# Patient Record
Sex: MALE | Race: WHITE | NOT HISPANIC OR LATINO | Employment: OTHER | URBAN - METROPOLITAN AREA
[De-identification: names, ages, dates, MRNs, and addresses within clinical notes are randomized per-mention and may not be internally consistent; named-entity substitution may affect disease eponyms.]

---

## 2018-08-25 ENCOUNTER — HOSPITAL ENCOUNTER (INPATIENT)
Facility: HOSPITAL | Age: 58
LOS: 9 days | DRG: 872 | End: 2018-09-06
Attending: EMERGENCY MEDICINE | Admitting: INTERNAL MEDICINE
Payer: COMMERCIAL

## 2018-08-25 DIAGNOSIS — F10.10 ALCOHOL ABUSE: ICD-10-CM

## 2018-08-25 DIAGNOSIS — M46.47 DISCITIS OF LUMBOSACRAL REGION: ICD-10-CM

## 2018-08-25 DIAGNOSIS — M46.46 DISCITIS OF LUMBAR REGION: ICD-10-CM

## 2018-08-25 DIAGNOSIS — M54.59 INTRACTABLE LOW BACK PAIN: Primary | ICD-10-CM

## 2018-08-25 DIAGNOSIS — M79.672 FOOT PAIN, LEFT: ICD-10-CM

## 2018-08-25 DIAGNOSIS — I10 HYPERTENSION: ICD-10-CM

## 2018-08-25 PROCEDURE — 96376 TX/PRO/DX INJ SAME DRUG ADON: CPT

## 2018-08-25 PROCEDURE — 96374 THER/PROPH/DIAG INJ IV PUSH: CPT

## 2018-08-25 RX ORDER — LIDOCAINE 50 MG/G
1 PATCH TOPICAL ONCE
Status: COMPLETED | OUTPATIENT
Start: 2018-08-25 | End: 2018-08-26

## 2018-08-25 RX ORDER — LEVOTHYROXINE AND LIOTHYRONINE 19; 4.5 UG/1; UG/1
30 TABLET ORAL DAILY
COMMUNITY
Start: 2018-06-12

## 2018-08-25 RX ORDER — CYCLOBENZAPRINE HCL 10 MG
5 TABLET ORAL ONCE
Status: COMPLETED | OUTPATIENT
Start: 2018-08-25 | End: 2018-08-25

## 2018-08-25 RX ORDER — AMLODIPINE BESYLATE 5 MG/1
5 TABLET ORAL DAILY
Status: ON HOLD | COMMUNITY
Start: 2018-04-16 | End: 2018-09-06

## 2018-08-25 RX ORDER — MORPHINE SULFATE 10 MG/ML
8 INJECTION, SOLUTION INTRAMUSCULAR; INTRAVENOUS ONCE
Status: COMPLETED | OUTPATIENT
Start: 2018-08-25 | End: 2018-08-25

## 2018-08-25 RX ADMIN — MORPHINE SULFATE 8 MG: 10 INJECTION, SOLUTION INTRAMUSCULAR; INTRAVENOUS at 23:19

## 2018-08-25 RX ADMIN — CYCLOBENZAPRINE HYDROCHLORIDE 5 MG: 10 TABLET, FILM COATED ORAL at 21:58

## 2018-08-25 RX ADMIN — MORPHINE SULFATE 8 MG: 10 INJECTION, SOLUTION INTRAMUSCULAR; INTRAVENOUS at 22:06

## 2018-08-25 RX ADMIN — LIDOCAINE 1 PATCH: 50 PATCH TOPICAL at 22:18

## 2018-08-26 ENCOUNTER — APPOINTMENT (OUTPATIENT)
Dept: RADIOLOGY | Facility: HOSPITAL | Age: 58
DRG: 872 | End: 2018-08-26
Payer: COMMERCIAL

## 2018-08-26 PROBLEM — D69.6 THROMBOCYTOPENIA (HCC): Status: ACTIVE | Noted: 2018-08-26

## 2018-08-26 PROBLEM — M54.59 INTRACTABLE LOW BACK PAIN: Status: ACTIVE | Noted: 2018-08-26

## 2018-08-26 PROBLEM — R79.89 ELEVATED LFTS: Status: ACTIVE | Noted: 2018-08-26

## 2018-08-26 PROBLEM — E87.6 HYPOKALEMIA: Status: ACTIVE | Noted: 2018-08-26

## 2018-08-26 LAB
ALBUMIN SERPL BCP-MCNC: 2.9 G/DL (ref 3.5–5)
ALBUMIN SERPL BCP-MCNC: 3.4 G/DL (ref 3.5–5)
ALP SERPL-CCNC: 136 U/L (ref 46–116)
ALP SERPL-CCNC: 143 U/L (ref 46–116)
ALT SERPL W P-5'-P-CCNC: 103 U/L (ref 12–78)
ALT SERPL W P-5'-P-CCNC: 90 U/L (ref 12–78)
ANION GAP SERPL CALCULATED.3IONS-SCNC: 12 MMOL/L (ref 4–13)
ANION GAP SERPL CALCULATED.3IONS-SCNC: 14 MMOL/L (ref 4–13)
AST SERPL W P-5'-P-CCNC: 116 U/L (ref 5–45)
AST SERPL W P-5'-P-CCNC: 93 U/L (ref 5–45)
BASOPHILS # BLD AUTO: 0.02 THOUSANDS/ΜL (ref 0–0.1)
BASOPHILS NFR BLD AUTO: 0 % (ref 0–1)
BILIRUB SERPL-MCNC: 1.5 MG/DL (ref 0.2–1)
BILIRUB SERPL-MCNC: 1.7 MG/DL (ref 0.2–1)
BUN SERPL-MCNC: 12 MG/DL (ref 5–25)
BUN SERPL-MCNC: 12 MG/DL (ref 5–25)
CALCIUM SERPL-MCNC: 8 MG/DL (ref 8.3–10.1)
CALCIUM SERPL-MCNC: 8.4 MG/DL (ref 8.3–10.1)
CHLORIDE SERPL-SCNC: 98 MMOL/L (ref 100–108)
CHLORIDE SERPL-SCNC: 98 MMOL/L (ref 100–108)
CO2 SERPL-SCNC: 21 MMOL/L (ref 21–32)
CO2 SERPL-SCNC: 25 MMOL/L (ref 21–32)
CREAT SERPL-MCNC: 1.09 MG/DL (ref 0.6–1.3)
CREAT SERPL-MCNC: 1.27 MG/DL (ref 0.6–1.3)
EOSINOPHIL # BLD AUTO: 0 THOUSAND/ΜL (ref 0–0.61)
EOSINOPHIL NFR BLD AUTO: 0 % (ref 0–6)
ERYTHROCYTE [DISTWIDTH] IN BLOOD BY AUTOMATED COUNT: 13.3 % (ref 11.6–15.1)
ERYTHROCYTE [DISTWIDTH] IN BLOOD BY AUTOMATED COUNT: 13.5 % (ref 11.6–15.1)
GFR SERPL CREATININE-BSD FRML MDRD: 61 ML/MIN/1.73SQ M
GFR SERPL CREATININE-BSD FRML MDRD: 74 ML/MIN/1.73SQ M
GLUCOSE SERPL-MCNC: 126 MG/DL (ref 65–140)
GLUCOSE SERPL-MCNC: 127 MG/DL (ref 65–140)
HCT VFR BLD AUTO: 37.4 % (ref 36.5–49.3)
HCT VFR BLD AUTO: 38.4 % (ref 36.5–49.3)
HGB BLD-MCNC: 13.1 G/DL (ref 12–17)
HGB BLD-MCNC: 13.2 G/DL (ref 12–17)
IMM GRANULOCYTES # BLD AUTO: 0.04 THOUSAND/UL (ref 0–0.2)
IMM GRANULOCYTES NFR BLD AUTO: 0 % (ref 0–2)
LYMPHOCYTES # BLD AUTO: 0.49 THOUSANDS/ΜL (ref 0.6–4.47)
LYMPHOCYTES NFR BLD AUTO: 6 % (ref 14–44)
MCH RBC QN AUTO: 33.2 PG (ref 26.8–34.3)
MCH RBC QN AUTO: 33.4 PG (ref 26.8–34.3)
MCHC RBC AUTO-ENTMCNC: 34.4 G/DL (ref 31.4–37.4)
MCHC RBC AUTO-ENTMCNC: 35 G/DL (ref 31.4–37.4)
MCV RBC AUTO: 95 FL (ref 82–98)
MCV RBC AUTO: 97 FL (ref 82–98)
MONOCYTES # BLD AUTO: 0.76 THOUSAND/ΜL (ref 0.17–1.22)
MONOCYTES NFR BLD AUTO: 9 % (ref 4–12)
NEUTROPHILS # BLD AUTO: 7.59 THOUSANDS/ΜL (ref 1.85–7.62)
NEUTS SEG NFR BLD AUTO: 85 % (ref 43–75)
NRBC BLD AUTO-RTO: 0 /100 WBCS
PLATELET # BLD AUTO: 61 THOUSANDS/UL (ref 149–390)
PLATELET # BLD AUTO: 72 THOUSANDS/UL (ref 149–390)
PMV BLD AUTO: 10 FL (ref 8.9–12.7)
PMV BLD AUTO: 9.6 FL (ref 8.9–12.7)
POTASSIUM SERPL-SCNC: 3.3 MMOL/L (ref 3.5–5.3)
POTASSIUM SERPL-SCNC: 3.3 MMOL/L (ref 3.5–5.3)
PROT SERPL-MCNC: 7.2 G/DL (ref 6.4–8.2)
PROT SERPL-MCNC: 7.6 G/DL (ref 6.4–8.2)
RBC # BLD AUTO: 3.92 MILLION/UL (ref 3.88–5.62)
RBC # BLD AUTO: 3.98 MILLION/UL (ref 3.88–5.62)
SODIUM SERPL-SCNC: 133 MMOL/L (ref 136–145)
SODIUM SERPL-SCNC: 135 MMOL/L (ref 136–145)
WBC # BLD AUTO: 7.25 THOUSAND/UL (ref 4.31–10.16)
WBC # BLD AUTO: 8.9 THOUSAND/UL (ref 4.31–10.16)

## 2018-08-26 PROCEDURE — 85027 COMPLETE CBC AUTOMATED: CPT | Performed by: PHYSICIAN ASSISTANT

## 2018-08-26 PROCEDURE — G8978 MOBILITY CURRENT STATUS: HCPCS

## 2018-08-26 PROCEDURE — 80053 COMPREHEN METABOLIC PANEL: CPT | Performed by: PHYSICIAN ASSISTANT

## 2018-08-26 PROCEDURE — 72110 X-RAY EXAM L-2 SPINE 4/>VWS: CPT

## 2018-08-26 PROCEDURE — 80053 COMPREHEN METABOLIC PANEL: CPT | Performed by: EMERGENCY MEDICINE

## 2018-08-26 PROCEDURE — 97163 PT EVAL HIGH COMPLEX 45 MIN: CPT

## 2018-08-26 PROCEDURE — G8979 MOBILITY GOAL STATUS: HCPCS

## 2018-08-26 PROCEDURE — 85025 COMPLETE CBC W/AUTO DIFF WBC: CPT | Performed by: EMERGENCY MEDICINE

## 2018-08-26 PROCEDURE — 99284 EMERGENCY DEPT VISIT MOD MDM: CPT

## 2018-08-26 PROCEDURE — 36415 COLL VENOUS BLD VENIPUNCTURE: CPT | Performed by: EMERGENCY MEDICINE

## 2018-08-26 PROCEDURE — 99220 PR INITIAL OBSERVATION CARE/DAY 70 MINUTES: CPT | Performed by: INTERNAL MEDICINE

## 2018-08-26 RX ORDER — KETOROLAC TROMETHAMINE 30 MG/ML
15 INJECTION, SOLUTION INTRAMUSCULAR; INTRAVENOUS EVERY 6 HOURS PRN
Status: DISCONTINUED | OUTPATIENT
Start: 2018-08-26 | End: 2018-08-26

## 2018-08-26 RX ORDER — LEVOTHYROXINE SODIUM 0.05 MG/1
50 TABLET ORAL
Status: DISCONTINUED | OUTPATIENT
Start: 2018-08-26 | End: 2018-09-06 | Stop reason: HOSPADM

## 2018-08-26 RX ORDER — METHOCARBAMOL 500 MG/1
500 TABLET, FILM COATED ORAL EVERY 6 HOURS PRN
Status: DISCONTINUED | OUTPATIENT
Start: 2018-08-26 | End: 2018-08-26

## 2018-08-26 RX ORDER — AMLODIPINE BESYLATE 5 MG/1
5 TABLET ORAL DAILY
Status: DISCONTINUED | OUTPATIENT
Start: 2018-08-26 | End: 2018-08-28

## 2018-08-26 RX ORDER — ONDANSETRON 2 MG/ML
4 INJECTION INTRAMUSCULAR; INTRAVENOUS EVERY 6 HOURS PRN
Status: DISCONTINUED | OUTPATIENT
Start: 2018-08-26 | End: 2018-09-06 | Stop reason: HOSPADM

## 2018-08-26 RX ORDER — LIDOCAINE 50 MG/G
1 PATCH TOPICAL DAILY
Status: DISCONTINUED | OUTPATIENT
Start: 2018-08-26 | End: 2018-08-27 | Stop reason: SDUPTHER

## 2018-08-26 RX ORDER — LANOLIN ALCOHOL/MO/W.PET/CERES
3 CREAM (GRAM) TOPICAL
Status: DISCONTINUED | OUTPATIENT
Start: 2018-08-26 | End: 2018-08-30

## 2018-08-26 RX ORDER — MORPHINE SULFATE 2 MG/ML
2 INJECTION, SOLUTION INTRAMUSCULAR; INTRAVENOUS EVERY 4 HOURS PRN
Status: DISCONTINUED | OUTPATIENT
Start: 2018-08-26 | End: 2018-09-06 | Stop reason: HOSPADM

## 2018-08-26 RX ORDER — IBUPROFEN 400 MG/1
400 TABLET ORAL EVERY 6 HOURS PRN
Status: DISCONTINUED | OUTPATIENT
Start: 2018-08-26 | End: 2018-08-26

## 2018-08-26 RX ORDER — ACETAMINOPHEN 325 MG/1
975 TABLET ORAL EVERY 8 HOURS SCHEDULED
Status: DISCONTINUED | OUTPATIENT
Start: 2018-08-26 | End: 2018-08-26

## 2018-08-26 RX ORDER — OXYCODONE HYDROCHLORIDE 5 MG/1
5 TABLET ORAL EVERY 4 HOURS PRN
Status: DISCONTINUED | OUTPATIENT
Start: 2018-08-26 | End: 2018-08-31

## 2018-08-26 RX ORDER — POTASSIUM CHLORIDE 20 MEQ/1
20 TABLET, EXTENDED RELEASE ORAL ONCE
Status: COMPLETED | OUTPATIENT
Start: 2018-08-26 | End: 2018-08-26

## 2018-08-26 RX ORDER — NAPROXEN 250 MG/1
500 TABLET ORAL 2 TIMES DAILY WITH MEALS
Status: DISCONTINUED | OUTPATIENT
Start: 2018-08-26 | End: 2018-08-29

## 2018-08-26 RX ORDER — POTASSIUM CHLORIDE 20 MEQ/1
40 TABLET, EXTENDED RELEASE ORAL ONCE
Status: COMPLETED | OUTPATIENT
Start: 2018-08-26 | End: 2018-08-26

## 2018-08-26 RX ORDER — METHOCARBAMOL 500 MG/1
500 TABLET, FILM COATED ORAL EVERY 6 HOURS SCHEDULED
Status: DISCONTINUED | OUTPATIENT
Start: 2018-08-26 | End: 2018-08-27

## 2018-08-26 RX ORDER — PANTOPRAZOLE SODIUM 40 MG/1
40 TABLET, DELAYED RELEASE ORAL
Status: DISCONTINUED | OUTPATIENT
Start: 2018-08-27 | End: 2018-09-06 | Stop reason: HOSPADM

## 2018-08-26 RX ORDER — TRAMADOL HYDROCHLORIDE 50 MG/1
50 TABLET ORAL EVERY 6 HOURS PRN
Status: DISCONTINUED | OUTPATIENT
Start: 2018-08-26 | End: 2018-09-06 | Stop reason: HOSPADM

## 2018-08-26 RX ADMIN — OXYCODONE HYDROCHLORIDE 5 MG: 5 TABLET ORAL at 14:33

## 2018-08-26 RX ADMIN — AMLODIPINE BESYLATE 5 MG: 5 TABLET ORAL at 08:05

## 2018-08-26 RX ADMIN — POTASSIUM CHLORIDE 20 MEQ: 1500 TABLET, EXTENDED RELEASE ORAL at 02:38

## 2018-08-26 RX ADMIN — POTASSIUM CHLORIDE 40 MEQ: 1500 TABLET, EXTENDED RELEASE ORAL at 09:06

## 2018-08-26 RX ADMIN — OXYCODONE HYDROCHLORIDE 5 MG: 5 TABLET ORAL at 21:17

## 2018-08-26 RX ADMIN — METHOCARBAMOL 500 MG: 500 TABLET ORAL at 17:35

## 2018-08-26 RX ADMIN — NAPROXEN 500 MG: 250 TABLET ORAL at 17:35

## 2018-08-26 RX ADMIN — MELATONIN TAB 3 MG 3 MG: 3 TAB at 21:17

## 2018-08-26 RX ADMIN — METHOCARBAMOL 500 MG: 500 TABLET ORAL at 08:05

## 2018-08-26 RX ADMIN — MELATONIN TAB 3 MG 3 MG: 3 TAB at 02:38

## 2018-08-26 RX ADMIN — TRAMADOL HYDROCHLORIDE 50 MG: 50 TABLET, COATED ORAL at 09:06

## 2018-08-26 RX ADMIN — LEVOTHYROXINE SODIUM 50 MCG: 50 TABLET ORAL at 05:10

## 2018-08-26 RX ADMIN — MORPHINE SULFATE 2 MG: 2 INJECTION, SOLUTION INTRAMUSCULAR; INTRAVENOUS at 11:10

## 2018-08-26 RX ADMIN — METHOCARBAMOL 500 MG: 500 TABLET ORAL at 02:38

## 2018-08-26 NOTE — ED NOTES
Pt requesting "sleep aid"  Provider aware, no orders to follow       Omid Huddleston RN  08/25/18 3003

## 2018-08-26 NOTE — ED PROVIDER NOTES
History  Chief Complaint   Patient presents with    Back Pain     Pt presents to the ED with severe exacerbation of back pain  Pt reports hx of L5 bulging disc  Pt states pain seems similiar  Pt reports potentional over exertion this week from exercise and working with a sledge hammer  Last med was ibuprofen 5 hrs ago  60 yr male with hx of lumbar hnp- on Wednesday was doing a lot of yard work- flew in from [facility] today - c/o low back pain across back in flight- worse upon landing-- no specific injury -- no b/b incont/ no saddle anesthesia- no ble weakness/senory comps- no abd /flank pain- no fevers- hx of ca        History provided by:  Patient   used: No    Back Pain       Prior to Admission Medications   Prescriptions Last Dose Informant Patient Reported? Taking? amLODIPine (NORVASC) 5 mg tablet  Self Yes Yes   Sig: Take 5 mg by mouth daily   thyroid desiccated (ARMOUR THYROID) 30 mg tablet  Self Yes Yes   Sig: Take 30 mg by mouth daily      Facility-Administered Medications: None       Past Medical History:   Diagnosis Date    Hyperlipidemia     Hypertension     Hypothyroidism     Lower back pain        History reviewed  No pertinent surgical history  History reviewed  No pertinent family history  I have reviewed and agree with the history as documented  Social History   Substance Use Topics    Smoking status: Never Smoker    Smokeless tobacco: Never Used    Alcohol use 9 6 oz/week     16 Cans of beer per week        Review of Systems   Constitutional: Negative  HENT: Negative  Eyes: Negative  Respiratory: Negative  Cardiovascular: Negative  Gastrointestinal: Negative  Endocrine: Negative  Genitourinary: Negative  Musculoskeletal: Positive for back pain  Negative for arthralgias, gait problem, joint swelling, myalgias, neck pain and neck stiffness  Skin: Negative  Allergic/Immunologic: Negative  Neurological: Negative      Hematological: Negative  Psychiatric/Behavioral: Negative  Physical Exam  Physical Exam   Constitutional: He is oriented to person, place, and time  He appears well-developed and well-nourished  He appears distressed  avss-- pulse ox 98 % on ra- interpretation is normal- no inter vention    HENT:   Head: Normocephalic and atraumatic  Eyes: Conjunctivae and EOM are normal  Pupils are equal, round, and reactive to light  Right eye exhibits no discharge  Left eye exhibits no discharge  No scleral icterus  Neck: Normal range of motion  Neck supple  No JVD present  No tracheal deviation present  No thyromegaly present  Cardiovascular: Normal rate, regular rhythm, normal heart sounds and intact distal pulses  Exam reveals no gallop and no friction rub  No murmur heard  Pulmonary/Chest: Effort normal and breath sounds normal  No stridor  No respiratory distress  He has no wheezes  He has no rales  He exhibits no tenderness  Abdominal: Soft  Bowel sounds are normal  He exhibits no distension and no mass  There is no tenderness  There is no rebound and no guarding  No hernia  Musculoskeletal: Normal range of motion  He exhibits no edema, tenderness or deformity  - pos pmt mid l spine tenderness- neg b slrt- pain wrore upon palpitation and any attempted movement-     - equal biataeral radial/dp pulses- no ble edema/claf tenderness/assym/ erythema   Lymphadenopathy:     He has no cervical adenopathy  Neurological: He is alert and oriented to person, place, and time  He displays normal reflexes  No cranial nerve deficit or sensory deficit  He exhibits normal muscle tone  Coordination normal    Skin: Skin is warm  Capillary refill takes less than 2 seconds  No rash noted  He is not diaphoretic  No erythema  No pallor  Psychiatric: He has a normal mood and affect  His behavior is normal  Judgment and thought content normal    Nursing note and vitals reviewed        Vital Signs  ED Triage Vitals [08/25/18 2125] Temperature Pulse Respirations Blood Pressure SpO2   98 °F (36 7 °C) 80 18 138/74 100 %      Temp Source Heart Rate Source Patient Position - Orthostatic VS BP Location FiO2 (%)   Oral Monitor Sitting Right arm --      Pain Score       Worst Possible Pain           Vitals:    08/25/18 2315 08/25/18 2330 08/26/18 0000 08/26/18 0039   BP:  123/65 127/66 131/68   Pulse: 88 90 96 99   Patient Position - Orthostatic VS:    Lying       Visual Acuity      ED Medications  Medications   lidocaine (LIDODERM) 5 % patch 1 patch (1 patch Transdermal Medication Applied 8/25/18 2218)   amLODIPine (NORVASC) tablet 5 mg (not administered)   levothyroxine tablet 50 mcg (not administered)   ondansetron (ZOFRAN) injection 4 mg (not administered)   ketorolac (TORADOL) injection 15 mg (not administered)   methocarbamol (ROBAXIN) tablet 500 mg (not administered)   melatonin tablet 3 mg (not administered)   lidocaine (LIDODERM) 5 % patch 1 patch (not administered)   traMADol (ULTRAM) tablet 50 mg (not administered)   morphine (PF) 10 mg/mL injection 8 mg (8 mg Intravenous Given 8/25/18 2206)   cyclobenzaprine (FLEXERIL) tablet 5 mg (5 mg Oral Given 8/25/18 2158)   morphine (PF) 10 mg/mL injection 8 mg (8 mg Intravenous Given 8/25/18 2319)       Diagnostic Studies  Results Reviewed     Procedure Component Value Units Date/Time    CBC and differential [[id number]]  (Abnormal) Collected:  08/26/18 0007    Lab Status:  Final result Specimen:  Blood from Arm, Right Updated:  08/26/18 0105     WBC 8 90 Thousand/uL      RBC 3 92 Million/uL      Hemoglobin 13 1 g/dL      Hematocrit 37 4 %      MCV 95 fL      MCH 33 4 pg      MCHC 35 0 g/dL      RDW 13 3 %      MPV 10 0 fL      Platelets 72 (L) Thousands/uL      nRBC 0 /100 WBCs      Neutrophils Relative 85 (H) %      Immat GRANS % 0 %      Lymphocytes Relative 6 (L) %      Monocytes Relative 9 %      Eosinophils Relative 0 %      Basophils Relative 0 %      Neutrophils Absolute 7 59 Thousands/µL Immature Grans Absolute 0 04 Thousand/uL      Lymphocytes Absolute 0 49 (L) Thousands/µL      Monocytes Absolute 0 76 Thousand/µL      Eosinophils Absolute 0 00 Thousand/µL      Basophils Absolute 0 02 Thousands/µL     Comprehensive metabolic panel [[id number]]  (Abnormal) Collected:  08/26/18 0007    Lab Status:  Final result Specimen:  Blood from Arm, Right Updated:  08/26/18 0029     Sodium 133 (L) mmol/L      Potassium 3 3 (L) mmol/L      Chloride 98 (L) mmol/L      CO2 21 mmol/L      Anion Gap 14 (H) mmol/L      BUN 12 mg/dL      Creatinine 1 09 mg/dL      Glucose 126 mg/dL      Calcium 8 4 mg/dL       (H) U/L       (H) U/L      Alkaline Phosphatase 143 (H) U/L      Total Protein 7 6 g/dL      Albumin 3 4 (L) g/dL      Total Bilirubin 1 70 (H) mg/dL      eGFR 74 ml/min/1 73sq m     Narrative:         National Kidney Disease Education Program recommendations are as follows:  GFR calculation is accurate only with a steady state creatinine  Chronic Kidney disease less than 60 ml/min/1 73 sq  meters  Kidney failure less than 15 ml/min/1 73 sq  meters                   XR spine lumbar minimum 4 views non injury    (Results Pending)              Procedures  Procedures       Phone Contacts  ED Phone Contact    ED Course  ED Course as of Aug 26 0124   Sat Aug 25, 2018   2254 ER MD NOTE- PT AND WIFE INFORMED ABOUT BaCK PAIN WITH RED FLAGS- PT HAS NO RED FLAGS-- WILL TRY TO CONTROL PAIN AND IF CAN NOT WILL ADMIT-- ER MD DID NOT GUARANTEE PT BRYAN IF HE IS ADMITTED-     2302 - ER MD NOTE-  PT - RE-EVALUATED- STILL IN PAIN- WILL RE DOSE MORPHINE AND RE-EVAL    Sun Aug 26, 2018   0000 - er md note- pt - re-evaluated feels improved with back pain -- aware of pending admit- will continue to re-evaluate in er                                 MDM  CritCare Time    Disposition  Final diagnoses:   Intractable low back pain     Time reflects when diagnosis was documented in both MDM as applicable and the Disposition within this note     Time User Action Codes Description Comment    8/25/2018 11:46 PM Fredo Barry Add [M54 5] Intractable low back pain       ED Disposition     ED Disposition Condition Comment    Admit  Case was discussed with DR MACDONALD and the patient's admission status was agreed to be Admission Status: observation status to the service of Dr Rick Heimlich   Follow-up Information    None         Current Discharge Medication List      CONTINUE these medications which have NOT CHANGED    Details   amLODIPine (NORVASC) 5 mg tablet Take 5 mg by mouth daily      thyroid desiccated (ARMOUR THYROID) 30 mg tablet Take 30 mg by mouth daily           No discharge procedures on file      ED Provider  Electronically Signed by           Gareth Wilson MD  08/26/18 0130

## 2018-08-26 NOTE — PLAN OF CARE
Problem: PHYSICAL THERAPY ADULT  Goal: Performs mobility at highest level of function for planned discharge setting  See evaluation for individualized goals  Treatment/Interventions: LE strengthening/ROM, Therapeutic exercise, Cognitive reorientation, Equipment eval/education, Bed mobility, Patient/family training  Equipment Recommended: Other (Comment) (pt needs dependent means for out of bed mobilization )       See flowsheet documentation for full assessment, interventions and recommendations  Prognosis: Fair  Problem List: Decreased strength, Decreased range of motion, Decreased endurance, Decreased mobility, Pain  Assessment: Pt presents with lower back pain  Has a history of a bulging disc at L5 which was reportedly repaired 5 years ago  Pt notes lower back pain over the past 6 months with some sciatica  Pt developed severe lower back pain with some radiation of the pain to his right buttock and some right foot numbness  order placed for PT eval and tx, w/ activity order of up w/ A  pt presents w/ comorbidities of hyperlipidemia, HTN, and lumbar surgery and personal factors of living in 2 story house and stair(s) to enter home  pt presents w/ pain, weakness, decreased ROM, decreased endurance, altered sensation and fall risk  these impairments are evident in findings from physical examination (weakness, decreased ROM and altered sensation), mobility assessment (need for assist for rolling and repositioning in bed, inability sit edge of bed or mobilize out of bed, need for input for mobility technique/body mechanics), and Barthel Index: 40/100  pt needed input for task focus and mobility technique  pt is at risk for falls due to physical and safety awareness deficits   pt's clinical presentation is unstable/unpredictable (evident in pain impacting overall mobility status, need for supplemental oxygen in order to maintain oxygen saturation, inability to mobilize out of bed, and need for input for mobility technique)  pt needs inpatient PT tx to improve mobility deficits  discharge recommendation is for inpatient rehab to reduce fall risk and maximize level of functional independence  Pt would likely benefit from orthopedics consult to address back pain  Pt presently needs dependent means for out of bed mobilization  Pt was instructed to raised head of bed as able  Also encouraged pt to change positions in bed as often as possible  NSG notified of recommendations  Recommendation: Post acute IP rehab, Other (Comment) (orthopedics consult regarding back pain)          See flowsheet documentation for full assessment

## 2018-08-26 NOTE — PHYSICAL THERAPY NOTE
PHYSICAL THERAPY EVALUATION NOTE    Patient Name: Fady Steiner  QPKEI'X Date: 8/26/2018  AGE:   60 y o   Mrn:   [id number]  ADMIT DX:  Back pain [M54 9]  Intractable low back pain [M54 5]    Past Medical History:   Diagnosis Date    Hyperlipidemia     Hypertension     Hypothyroidism     Lower back pain      Length Of Stay: 0  PHYSICAL THERAPY EVALUATION :   08/26/18 1541   Pain Assessment   Pain Assessment 0-10   Pain Score 8   Pain Location (lumbar and right buttock)   Home Living   Additional Comments pt is visiting from [place]  at home pt has 1 story house w/ 3 VINC[address]  pt has cabin in [facility] w/ 2 stories (3 [address] and 1st floor setup possible)  pt ambulated w/o assistive device  no DME  independent w/ ADLs and driving  pt works as   no history of supplemental oxygen use  Prior Function   Comments pt seen supine in bed w/ family present  agreed to PT eval  c/o lumbar and right buttock pain  pt wants to "get the pain to go away" and to "walk "   Restrictions/Precautions   Other Precautions Bed Alarm;O2;Fall Risk;Pain   General   Additional Pertinent History 1L oxygen via nasal cannula  resting pulse ox 96% and 91 BPM, active 94% and 101 BPM    Family/Caregiver Present Yes   Cognition   Overall Cognitive Status WFL   Arousal/Participation Alert   Orientation Level Oriented X4;Other (Comment)  (pt was identified w/ full name and birth date)   Following Commands Follows all commands and directions without difficulty   Comments Dr Candelario Perez entered during session and stated CT of lumbar spine would be ordered     RUE Assessment   RUE Assessment WFL  (4/5, shoulder 3/5)   LUE Assessment   LUE Assessment (4/5, shoulder 3-/5)   RLE Assessment   RLE Assessment X  (3/5, hip flexion and knee flexion 3-/5)   LLE Assessment   LLE Assessment X  (3/5, hip flexion and knee flexion 3-/5)   Coordination   Movements are Fluid and Coordinated 1   Sensation (light touch impaired right toes )   Bed Mobility   Rolling R 2  Maximal assistance   Additional items Assist x 1;Bedrails; Increased time required;Verbal cues;LE management  (+ muscle spasms)   Rolling L 2  Maximal assistance   Additional items Assist x 1;Bedrails; Increased time required;Verbal cues;LE management  (+ muscle spasms)   Supine to Sit Unable to assess  (due to pain)   Additional Comments pt was unable to tolerate head of bed elevation beyond 15* due to pain  Balance   Static Sitting Zero   Activity Tolerance   Activity Tolerance Patient limited by fatigue;Patient limited by pain   Nurse Made Aware spoke to Claudetta Gun, Dr Blease Goad CM   Assessment   Prognosis Fair   Problem List Decreased strength;Decreased range of motion;Decreased endurance;Decreased mobility;Pain   Assessment Pt presents with lower back pain  Has a history of a bulging disc at L5 which was reportedly repaired 5 years ago  Pt notes lower back pain over the past 6 months with some sciatica  Pt developed severe lower back pain with some radiation of the pain to his right buttock and some right foot numbness  order placed for PT eval and tx, w/ activity order of up w/ A  pt presents w/ comorbidities of hyperlipidemia, HTN, and lumbar surgery and personal factors of living in 2 story house and stair(s) to enter home  pt presents w/ pain, weakness, decreased ROM, decreased endurance, altered sensation and fall risk  these impairments are evident in findings from physical examination (weakness, decreased ROM and altered sensation), mobility assessment (need for assist for rolling and repositioning in bed, inability sit edge of bed or mobilize out of bed, need for input for mobility technique/body mechanics), and Barthel Index: 40/100  pt needed input for task focus and mobility technique  pt is at risk for falls due to physical and safety awareness deficits   pt's clinical presentation is unstable/unpredictable (evident in pain impacting overall mobility status, need for supplemental oxygen in order to maintain oxygen saturation, inability to mobilize out of bed, and need for input for mobility technique)  pt needs inpatient PT tx to improve mobility deficits  discharge recommendation is for inpatient rehab to reduce fall risk and maximize level of functional independence  Pt would likely benefit from orthopedics consult to address back pain  Pt presently needs dependent means for out of bed mobilization  Pt was instructed to raised head of bed as able  Also encouraged pt to change positions in bed as often as possible  NSG notified of recommendations  Goals   Patient Goals to walk, get the pain to go away   STG Expiration Date 09/05/18   Short Term Goal #1 pt will: Increase bilateral LE strength 1 grade to facilitate independent mobility, Perform all rolling and repositioning in bed independently to decrease fall risk factors and expedite return to independent ambulation, Complete exercise program independently to increase overall activity tolerance, Improve Barthel Index score to 65 or greater to facilitate independence, Complete mobility assessment to facilitate return to previous functional and work status   Treatment Day 0   Plan   Treatment/Interventions LE strengthening/ROM; Therapeutic exercise;Cognitive reorientation;Equipment eval/education; Bed mobility; Patient/family training   PT Frequency Other (Comment)  (3 to 5x/week as able)   Recommendation   Recommendation Post acute IP rehab; Other (Comment)  (orthopedics consult regarding back pain)   Equipment Recommended Other (Comment)  (pt needs dependent means for out of bed mobilization )   Barthel Index   Feeding 10   Bathing 0   Grooming Score 5   Dressing Score 5   Bladder Score 10   Bowels Score 10   Toilet Use Score 0   Transfers (Bed/Chair) Score 0   Mobility (Level Surface) Score 0   Stairs Score 0   Barthel Index Score 40     Skilled PT recommended while in hospital and upon DC to progress pt toward treatment goals       Josephine Lee, PT

## 2018-08-26 NOTE — ASSESSMENT & PLAN NOTE
· Presented with intractable lower back pain  · History of lower back pain and surgery 5 years ago for bulging disc at L5  · Pain control with lidocaine patch, prn tramadol, prn Robaxin and Aqua K pad  · Avoid Tylenol given elevated LFTs  · Obtain x-ray of L-spine  · Patient requesting MRI of lumbar spine; consider further imaging if pain does not improve/ worsens  · PT evaluation

## 2018-08-26 NOTE — CASE MANAGEMENT
Initial Clinical Review    Admission: Date/Time/Statement: 8/25 @ 2348    Orders Placed This Encounter   Procedures    Place in Observation (expected length of stay for this patient is less than two midnights)     Standing Status:   Standing     Number of Occurrences:   1     Order Specific Question:   Admitting Physician     Answer:   Kenyetta Wilder     Order Specific Question:   Level of Care     Answer:   Med Surg [16]         ED: Date/Time/Mode of Arrival:   ED Arrival Information     Expected Arrival Acuity Means of Arrival Escorted By Service Admission Type    - 8/25/2018 21:20 Urgent Stretcher Spouse General Medicine Urgent    Arrival Complaint    BACK PAIN          Chief Complaint:   Chief Complaint   Patient presents with    Back Pain     Pt presents to the ED with severe exacerbation of back pain  Pt reports hx of L5 bulging disc  Pt states pain seems similiar  Pt reports potentional over exertion this week from exercise and working with a sledge hammer  Last med was ibuprofen 5 hrs ago  History of Illness: Elizabeth Alamo is a 60 y o  male who presents with lower back pain  Patient has a history of a bulging disc at L5 which was reportedly repaired 5 years ago  He notes lower back pain over the past 6 months with some sciatica  He reports that about 5 days he had done a lot of yard work, was working with a sledge hammer and then yesterday he got on a plane from [place], where he resides, to come to the area and about an hour and a half into the flight he developed severe lower back pain with some radiation of the pain to his right buttock and some right foot numbness  He reports that he has had difficulty ambulating since due to the pain  He denies any recent fevers/ chills, abdominal pain, bowel or bladder changes  He notes worsening of his pain with movement   He received a total of 16 mg of IV morphine in the ED as well as Flexeril and a lidocaine patch and noticed some improvement in his pain         ED Vital Signs:   ED Triage Vitals [08/25/18 2125]   Temperature Pulse Respirations Blood Pressure SpO2   98 °F (36 7 °C) 80 18 138/74 100 %      Temp Source Heart Rate Source Patient Position - Orthostatic VS BP Location FiO2 (%)   Oral Monitor Sitting Right arm --      Pain Score       Worst Possible Pain        Wt Readings from Last 1 Encounters:   08/25/18 75 5 kg (166 lb 7 2 oz)       Vital Signs (abnormal):     Date/Time  Temp  Pulse  Resp  BP  MAP (mmHg)  SpO2  O2 Device  Patient Position - Orthostatic VS   08/26/18 0748  98 3 °F (36 8 °C)  91  18  136/84  105  97 %  Nasal cannula  Lying   08/26/18 0039  98 5 °F (36 9 °C)  99  18  131/68  93  98 %  Nasal cannula  Lying   08/26/18 0000  --  96  16  127/66  --  97 %  --  --   08/25/18 2330  --  90  16  123/65  --  97 %  --  --   08/25/18 2315  --  88  --  --  --  98 %  Nasal cannula  - 2L NC         Abnormal Labs/Diagnostic Test Results:   Results from last 7 days  Lab Units 08/26/18  0007   SODIUM mmol/L 133*   POTASSIUM mmol/L 3 3*   CHLORIDE mmol/L 98*   CO2 mmol/L 21   BUN mg/dL 12   CREATININE mg/dL 1 09   CALCIUM mg/dL 8 4   TOTAL PROTEIN g/dL 7 6   BILIRUBIN TOTAL mg/dL 1 70*   ALK PHOS U/L 143*   ALT U/L 103*   AST U/L 116*   GLUCOSE RANDOM mg/dL 126         ED Treatment:   Medication Administration from 08/25/2018 2120 to 08/26/2018 0031       Date/Time Order Dose Route Action Comments     08/25/2018 2206 morphine (PF) 10 mg/mL injection 8 mg 8 mg Intravenous Given      08/25/2018 2158 cyclobenzaprine (FLEXERIL) tablet 5 mg 5 mg Oral Given      08/25/2018 2218 lidocaine (LIDODERM) 5 % patch 1 patch 1 patch Transdermal Medication Applied      08/25/2018 2319 morphine (PF) 10 mg/mL injection 8 mg 8 mg Intravenous Given           Past Medical/Surgical History:    Active Ambulatory Problems     Diagnosis Date Noted    No Active Ambulatory Problems     Resolved Ambulatory Problems     Diagnosis Date Noted    No Resolved Ambulatory Problems     Past Medical History:   Diagnosis Date    Hyperlipidemia     Hypertension     Hypothyroidism     Lower back pain        Admitting Diagnosis: Back pain [M54 9]  Intractable low back pain [M54 5]    Age/Sex: 60 y o  male    Assessment/Plan:   * Intractable low back pain   Assessment & Plan     · Presented with intractable lower back pain  ? History of lower back pain and surgery 5 years ago for bulging disc at L5  · Pain control with lidocaine patch, prn tramadol, prn Robaxin and Aqua K pad    ? Avoid Tylenol given elevated LFTs  · Obtain x-ray of L-spine  ? Patient requesting MRI of lumbar spine; consider further imaging if pain does not improve/ worsens  · PT evaluation            Elevated LFTs   Assessment & Plan     · Patient reports known h/o elevated LFTs which has been attributed to his alcohol use  · Patient currently in 12 step program for his alcohol use  · Denies abdominal pain  · Continue to monitor  Alcohol cessation            Thrombocytopenia (HCC)   Assessment & Plan     · Platelet count 72  Possibly due to alcohol use  · Repeat CBC in AM           Hypothyroidism   Assessment & Plan     · Taking Laurel 30 mg daily  Substitute levothyroxine while inpatient            Hypertension   Assessment & Plan     · BP acceptable  · Continue amlodipine           Hyperlipidemia   Assessment & Plan     · Reportedly taking atorvastatin, unable to recall dosage           Hypokalemia   Assessment & Plan     · Replete  Repeat BMP in AM               VTE Prophylaxis: low risk  / sequential compression device   Code Status: Level 1- Full Code     Anticipated Length of Stay:  Patient will be admitted on an Observation basis with an anticipated length of stay of  < 2 midnights  Justification for Hospital Stay: lower back pain       Admission Orders:  Scheduled Meds:   Current Facility-Administered Medications:  amLODIPine 5 mg Oral Daily Ernesto Villalta PA-C   levothyroxine 50 mcg Oral Early Morning Ophelia Zuñiga PA-C   lidocaine 1 patch Transdermal Once Anjana Jarrett MD   lidocaine 1 patch Transdermal Daily Ophelia Zuñiga PA-C   melatonin 3 mg Oral HS Ophelia Zuñiga PA-C   methocarbamol 500 mg Oral Q6H PRN Ophelia Zuñiga PA-C   ondansetron 4 mg Intravenous Q6H PRN Ophelia Zuñiga PA-C   traMADol 50 mg Oral Q6H PRN Ophelia Zuñiga PA-C     Continuous Infusions:    PRN Meds: methocarbamol    ondansetron    traMADol    Regular diet  Xray lumbar spine  PT eval treat  SCD's

## 2018-08-26 NOTE — H&P
H&P- Mikayla Ambrocio 1960, 60 y o  male MRN: [id number]    Unit/Bed#: -01 Encounter: [id number]    Primary Care Provider: No primary care provider on file  Date and time admitted to hospital: 8/25/2018  9:25 PM        * Intractable low back pain   Assessment & Plan    · Presented with intractable lower back pain  · History of lower back pain and surgery 5 years ago for bulging disc at L5  · Pain control with lidocaine patch, prn tramadol, prn Robaxin and Aqua K pad  · Avoid Tylenol given elevated LFTs  · Obtain x-ray of L-spine  · Patient requesting MRI of lumbar spine; consider further imaging if pain does not improve/ worsens  · PT evaluation  Elevated LFTs   Assessment & Plan    · Patient reports known h/o elevated LFTs which has been attributed to his alcohol use  · Patient currently in 12 step program for his alcohol use  · Denies abdominal pain  · Continue to monitor  Alcohol cessation  Thrombocytopenia (HCC)   Assessment & Plan    · Platelet count 72  Possibly due to alcohol use  · Repeat CBC in AM         Hypothyroidism   Assessment & Plan    · Taking Stanley 30 mg daily  Substitute levothyroxine while inpatient  Hypertension   Assessment & Plan    · BP acceptable  · Continue amlodipine  Hyperlipidemia   Assessment & Plan    · Reportedly taking atorvastatin, unable to recall dosage  Hypokalemia   Assessment & Plan    · Replete  Repeat BMP in AM            VTE Prophylaxis: low risk  / sequential compression device   Code Status: Level 1- Full Code  POLST: POLST form is not discussed and not completed at this time  Anticipated Length of Stay:  Patient will be admitted on an Observation basis with an anticipated length of stay of  < 2 midnights  Justification for Hospital Stay: lower back pain  Total Time for Visit, including Counseling / Coordination of Care: 45 minutes    Greater than 50% of this total time spent on direct patient counseling and coordination of care  Chief Complaint:   Lower back pain  History of Present Illness:    Tiffani Stephenson is a 60 y o  male who presents with lower back pain  Patient has a history of a bulging disc at L5 which was reportedly repaired 5 years ago  He notes lower back pain over the past 6 months with some sciatica  He reports that about 5 days he had done a lot of yard work, was working with a sledge hammer and then yesterday he got on a plane from [place], where he resides, to come to the area and about an hour and a half into the flight he developed severe lower back pain with some radiation of the pain to his right buttock and some right foot numbness  He reports that he has had difficulty ambulating since due to the pain  He denies any recent fevers/ chills, abdominal pain, bowel or bladder changes  He notes worsening of his pain with movement  He received a total of 16 mg of IV morphine in the ED as well as Flexeril and a lidocaine patch and noticed some improvement in his pain  Review of Systems:    Review of Systems   Musculoskeletal: Positive for back pain and gait problem  All other systems reviewed and are negative  Past Medical and Surgical History:     Past Medical History:   Diagnosis Date    Hyperlipidemia     Hypertension     Hypothyroidism     Lower back pain        History reviewed  No pertinent surgical history  Meds/Allergies:    Prior to Admission medications    Medication Sig Start Date End Date Taking? Authorizing Provider   amLODIPine (NORVASC) 5 mg tablet Take 5 mg by mouth daily 4/16/18  Yes Historical Provider, MD   thyroid desiccated (ARMOUR THYROID) 30 mg tablet Take 30 mg by mouth daily 6/12/18  Yes Historical Provider, MD     I have reviewed home medications with patient personally      Allergies: No Known Allergies    Social History:     Marital Status: /Civil Union   Patient Pre-hospital Living Situation: Resides in [place] with family  Patient Pre-hospital Level of Mobility: ambulates without assistance  Patient Pre-hospital Diet Restrictions: none  Substance Use History:   History   Alcohol Use    9 6 oz/week    16 Cans of beer per week     History   Smoking Status    Never Smoker   Smokeless Tobacco    Never Used     History   Drug Use No       Family History:    History reviewed  No pertinent family history  Physical Exam:     Vitals:   Blood Pressure: 131/68 (08/26/18 0039)  Pulse: 99 (08/26/18 0039)  Temperature: 98 5 °F (36 9 °C) (08/26/18 0039)  Temp Source: Oral (08/26/18 0039)  Respirations: 18 (08/26/18 0039)  Weight - Scale: 75 5 kg (166 lb 7 2 oz) (08/25/18 2125)  SpO2: 98 % (08/26/18 0039)    Physical Exam   Constitutional: He is oriented to person, place, and time  He appears well-developed and well-nourished  No distress  HENT:   Head: Normocephalic and atraumatic  Eyes: Conjunctivae are normal    Cardiovascular: Normal rate and regular rhythm  Pulmonary/Chest: Effort normal and breath sounds normal  No respiratory distress  Abdominal: Soft  Bowel sounds are normal  He exhibits no distension  There is no tenderness  Musculoskeletal: He exhibits no edema  Lumbar back: He exhibits tenderness and pain (with movement)  Neurological: He is alert and oriented to person, place, and time  No cranial nerve deficit  Skin: Skin is warm and dry  He is not diaphoretic  No erythema  Psychiatric: He has a normal mood and affect  His behavior is normal    Nursing note and vitals reviewed  Additional Data:     Lab Results: I have personally reviewed pertinent reports          Results from last 7 days  Lab Units 08/26/18 0007   WBC Thousand/uL 8 90   HEMOGLOBIN g/dL 13 1   HEMATOCRIT % 37 4   PLATELETS Thousands/uL 72*   NEUTROS PCT % 85*   LYMPHS PCT % 6*   MONOS PCT % 9   EOS PCT % 0       Results from last 7 days  Lab Units 08/26/18 0007   SODIUM mmol/L 133*   POTASSIUM mmol/L 3 3*   CHLORIDE mmol/L 98* CO2 mmol/L 21   BUN mg/dL 12   CREATININE mg/dL 1 09   CALCIUM mg/dL 8 4   TOTAL PROTEIN g/dL 7 6   BILIRUBIN TOTAL mg/dL 1 70*   ALK PHOS U/L 143*   ALT U/L 103*   AST U/L 116*   GLUCOSE RANDOM mg/dL 126                   Imaging: imaging pending    XR spine lumbar minimum 4 views non injury    (Results Pending)     Allscripts / Epic Records Reviewed: Yes     ** Please Note: This note has been constructed using a voice recognition system   **

## 2018-08-26 NOTE — ASSESSMENT & PLAN NOTE
· Patient reports known h/o elevated LFTs which has been attributed to his alcohol use  · Patient currently in 12 step program for his alcohol use  · Denies abdominal pain  · Continue to monitor  Alcohol cessation

## 2018-08-27 ENCOUNTER — APPOINTMENT (OUTPATIENT)
Dept: CT IMAGING | Facility: HOSPITAL | Age: 58
DRG: 872 | End: 2018-08-27
Payer: COMMERCIAL

## 2018-08-27 PROBLEM — E87.1 HYPONATREMIA: Status: ACTIVE | Noted: 2018-08-27

## 2018-08-27 PROBLEM — E87.6 HYPOKALEMIA: Status: RESOLVED | Noted: 2018-08-26 | Resolved: 2018-08-27

## 2018-08-27 LAB
ANION GAP SERPL CALCULATED.3IONS-SCNC: 8 MMOL/L (ref 4–13)
BUN SERPL-MCNC: 11 MG/DL (ref 5–25)
CALCIUM SERPL-MCNC: 8.5 MG/DL (ref 8.3–10.1)
CHLORIDE SERPL-SCNC: 102 MMOL/L (ref 100–108)
CO2 SERPL-SCNC: 24 MMOL/L (ref 21–32)
CREAT SERPL-MCNC: 0.97 MG/DL (ref 0.6–1.3)
CRP SERPL QL: >90 MG/L
ERYTHROCYTE [DISTWIDTH] IN BLOOD BY AUTOMATED COUNT: 13.4 % (ref 11.6–15.1)
GFR SERPL CREATININE-BSD FRML MDRD: 85 ML/MIN/1.73SQ M
GLUCOSE SERPL-MCNC: 131 MG/DL (ref 65–140)
HCT VFR BLD AUTO: 37.9 % (ref 36.5–49.3)
HGB BLD-MCNC: 12.7 G/DL (ref 12–17)
MAGNESIUM SERPL-MCNC: 2.1 MG/DL (ref 1.6–2.6)
MCH RBC QN AUTO: 32.8 PG (ref 26.8–34.3)
MCHC RBC AUTO-ENTMCNC: 33.5 G/DL (ref 31.4–37.4)
MCV RBC AUTO: 98 FL (ref 82–98)
PLATELET # BLD AUTO: 55 THOUSANDS/UL (ref 149–390)
PMV BLD AUTO: 10.2 FL (ref 8.9–12.7)
POTASSIUM SERPL-SCNC: 3.9 MMOL/L (ref 3.5–5.3)
RBC # BLD AUTO: 3.87 MILLION/UL (ref 3.88–5.62)
SODIUM SERPL-SCNC: 134 MMOL/L (ref 136–145)
WBC # BLD AUTO: 6.25 THOUSAND/UL (ref 4.31–10.16)

## 2018-08-27 PROCEDURE — 85027 COMPLETE CBC AUTOMATED: CPT | Performed by: INTERNAL MEDICINE

## 2018-08-27 PROCEDURE — 80048 BASIC METABOLIC PNL TOTAL CA: CPT | Performed by: INTERNAL MEDICINE

## 2018-08-27 PROCEDURE — 83735 ASSAY OF MAGNESIUM: CPT | Performed by: INTERNAL MEDICINE

## 2018-08-27 PROCEDURE — 97530 THERAPEUTIC ACTIVITIES: CPT

## 2018-08-27 PROCEDURE — 99245 OFF/OP CONSLTJ NEW/EST HI 55: CPT | Performed by: NEUROLOGICAL SURGERY

## 2018-08-27 PROCEDURE — 72131 CT LUMBAR SPINE W/O DYE: CPT

## 2018-08-27 PROCEDURE — 99225 PR SBSQ OBSERVATION CARE/DAY 25 MINUTES: CPT | Performed by: PHYSICIAN ASSISTANT

## 2018-08-27 PROCEDURE — 86140 C-REACTIVE PROTEIN: CPT | Performed by: PHYSICIAN ASSISTANT

## 2018-08-27 RX ORDER — MUSCLE RUB CREAM 100; 150 MG/G; MG/G
CREAM TOPICAL 4 TIMES DAILY PRN
Status: DISCONTINUED | OUTPATIENT
Start: 2018-08-27 | End: 2018-08-30

## 2018-08-27 RX ORDER — METHOCARBAMOL 750 MG/1
750 TABLET, FILM COATED ORAL EVERY 6 HOURS SCHEDULED
Status: DISCONTINUED | OUTPATIENT
Start: 2018-08-27 | End: 2018-09-06 | Stop reason: HOSPADM

## 2018-08-27 RX ORDER — LIDOCAINE 50 MG/G
1 PATCH TOPICAL DAILY
Status: DISCONTINUED | OUTPATIENT
Start: 2018-08-27 | End: 2018-08-30

## 2018-08-27 RX ADMIN — MELATONIN TAB 3 MG 3 MG: 3 TAB at 22:29

## 2018-08-27 RX ADMIN — OXYCODONE HYDROCHLORIDE 5 MG: 5 TABLET ORAL at 22:29

## 2018-08-27 RX ADMIN — METHOCARBAMOL 750 MG: 750 TABLET, FILM COATED ORAL at 17:03

## 2018-08-27 RX ADMIN — METHOCARBAMOL 750 MG: 750 TABLET, FILM COATED ORAL at 13:14

## 2018-08-27 RX ADMIN — METHOCARBAMOL 750 MG: 750 TABLET, FILM COATED ORAL at 23:59

## 2018-08-27 RX ADMIN — METHOCARBAMOL 500 MG: 500 TABLET ORAL at 01:16

## 2018-08-27 RX ADMIN — LEVOTHYROXINE SODIUM 50 MCG: 50 TABLET ORAL at 06:18

## 2018-08-27 RX ADMIN — OXYCODONE HYDROCHLORIDE 5 MG: 5 TABLET ORAL at 18:14

## 2018-08-27 RX ADMIN — OXYCODONE HYDROCHLORIDE 5 MG: 5 TABLET ORAL at 13:14

## 2018-08-27 RX ADMIN — MENTHOL, METHYL SALICYLATE 1 APPLICATION: 10; 15 CREAM TOPICAL at 22:31

## 2018-08-27 RX ADMIN — NAPROXEN 500 MG: 250 TABLET ORAL at 17:04

## 2018-08-27 RX ADMIN — NAPROXEN 500 MG: 250 TABLET ORAL at 08:30

## 2018-08-27 RX ADMIN — LIDOCAINE 1 PATCH: 50 PATCH CUTANEOUS at 11:00

## 2018-08-27 RX ADMIN — MORPHINE SULFATE 2 MG: 2 INJECTION, SOLUTION INTRAMUSCULAR; INTRAVENOUS at 19:28

## 2018-08-27 RX ADMIN — PANTOPRAZOLE SODIUM 40 MG: 40 TABLET, DELAYED RELEASE ORAL at 06:17

## 2018-08-27 RX ADMIN — METHOCARBAMOL 500 MG: 500 TABLET ORAL at 06:18

## 2018-08-27 NOTE — PLAN OF CARE
Problem: PHYSICAL THERAPY ADULT  Goal: Performs mobility at highest level of function for planned discharge setting  See evaluation for individualized goals  Treatment/Interventions: LE strengthening/ROM, Therapeutic exercise, Cognitive reorientation, Equipment eval/education, Bed mobility, Patient/family training  Equipment Recommended: Other (Comment) (pt needs dependent means for out of bed mobilization )       See flowsheet documentation for full assessment, interventions and recommendations  Outcome: Progressing  Prognosis: Fair  Problem List: Decreased strength, Decreased range of motion, Decreased endurance, Decreased mobility, Pain, Impaired balance  Assessment: Nursing premedicated pt prior to session  Pt reports feeling a little better, able to move more in the bed with LE's  Pt was successful in performing supine<>sit using a majority of bed controls, but only tolerated sitting for 3min on edge of bed  Pt was + for SLR on LLE and notified SLIM as there is no current  documentation from NS  Await further recommendations from SLIM/ NS to facilitate progression toward increased level of function  Goals adjusted below to reflect progress  Barriers to Discharge: Inaccessible home environment     Recommendation: Post acute IP rehab          See flowsheet documentation for full assessment

## 2018-08-27 NOTE — ASSESSMENT & PLAN NOTE
· Patient reports known h/o mildly elevated LFTs which has been attributed to his alcohol use  · Patient currently in 12 step program for his alcohol use  · Denies abdominal pain  · Continue to monitor  Alcohol cessation

## 2018-08-27 NOTE — CONSULTS
Consultation - Neurosurgery   Fransico Vargas 60 y o  male MRN: [id number]  Unit/Bed#: -01 Encounter: [id number]      Assessment/Plan     Assessment:  Acute low back pain, concern for re-herniation  Low likelihood of discitis but will r/o with imaging  Plan:  MRI l/s with and without contrast  ESR/CRP    History of Present Illness       HPI: Fransico Vargas is a 60y o  year old male who presents with acute onset of LBP and worsening LLE foot pain after heavy lifting recently  He is from the [place] area and is visiting family in the area  No b/b issues  He had a hx of diskectomy 5 yrs prior with improvement in leg pain  This episode is new  He reports some subjective weakness in the distal LLE and pain in the foot no associated numbness  Consults    Review of Systems   Constitutional: Negative  Eyes: Negative  Respiratory: Negative  Cardiovascular: Negative  Gastrointestinal: Negative  Genitourinary: Negative  Allergic/Immunologic: Negative  Hematological: Negative  Psychiatric/Behavioral: Negative  Historical Information   Past Medical History:   Diagnosis Date    Hyperlipidemia     Hypertension     Hypothyroidism     Lower back pain      History reviewed  No pertinent surgical history  History   Alcohol Use    9 6 oz/week    16 Cans of beer per week     History   Drug Use No     History   Smoking Status    Never Smoker   Smokeless Tobacco    Never Used     History reviewed  No pertinent family history  Meds/Allergies   all current active meds have been reviewed  No Known Allergies    Objective     Intake/Output Summary (Last 24 hours) at 08/27/18 1717  Last data filed at 08/27/18 1635   Gross per 24 hour   Intake              940 ml   Output             1350 ml   Net             -410 ml       Physical Exam   Constitutional: He is oriented to person, place, and time  He appears well-developed and well-nourished  HENT:   Head: Normocephalic and atraumatic  Eyes: Pupils are equal, round, and reactive to light  Neck: Normal range of motion  Neck supple  Cardiovascular: Normal rate  Pulmonary/Chest: Effort normal    Abdominal: Soft  Bowel sounds are normal    Musculoskeletal: Normal range of motion  Neurological: He is alert and oriented to person, place, and time  He has normal reflexes  No cranial nerve deficit or sensory deficit  GCS eye subscore is 4  GCS verbal subscore is 5  GCS motor subscore is 6    5/5 BUE  5/5 RLE  5/5 LLE HF, KE/KF, 4+/5 DF/PF EHL     Neurologic Exam     Mental Status   Oriented to person, place, and time  Cranial Nerves     CN III, IV, VI   Pupils are equal, round, and reactive to light  Vitals:Blood pressure 120/59, pulse 81, temperature 98 1 °F (36 7 °C), temperature source Oral, resp  rate 18, weight 75 5 kg (166 lb 7 2 oz), SpO2 97 %  ,There is no height or weight on file to calculate BMI  Lab Results: I have personally reviewed pertinent results  Imaging Studies: I have personally reviewed pertinent reports  CT shows L5-S1 possible discitis versus post op changes    EKG, Pathology, and Other Studies: I have personally reviewed pertinent reports        VTE Prophylaxis:     Code Status: Level 1 - Full Code  Advance Directive and Living Will:      Power of :    POLST:      Counseling / Coordination of Care

## 2018-08-27 NOTE — CASE MANAGEMENT
Continued Stay Review    Date: 8/27/2018    Vital Signs: /59 (BP Location: Right arm)   Pulse 89   Temp 98 8 °F (37 1 °C) (Oral)   Resp 18   Wt 75 5 kg (166 lb 7 2 oz)   SpO2 96%     Medications:   Scheduled Meds:   Current Facility-Administered Medications:  amLODIPine 5 mg Oral Daily   levothyroxine 50 mcg Oral Early Morning   lidocaine 1 patch Transdermal Daily   melatonin 3 mg Oral HS   menthol-methyl salicylate  Apply externally 4x Daily PRN   methocarbamol 750 mg Oral Q6H ANIA   morphine injection 2 mg Intravenous Q4H PRN   naproxen 500 mg Oral BID With Meals   ondansetron 4 mg Intravenous Q6H PRN   oxyCODONE 5 mg Oral Q4H PRN   pantoprazole 40 mg Oral Early Morning   traMADol 50 mg Oral Q6H PRN     Continuous Infusions:    PRN Meds: not used    Abnormal Labs/Diagnostic Results:   Platelets 55    Ct lumbar spine - Multilevel degenerative changes   Endplate changes most severe at the L5-S1 level   No phlegmon at L5-S1 to suggest changes are related to discitis/osteomyelitis   If there is clinical concern for infection, serologic exclusion suggested    Age/Sex: 60 y o  male     Assessment/Plan:   Intractable low back pain   Assessment & Plan     · Presented with intractable lower back pain, also with pain into his left foot after doing significant exertion in his yard and going on a prolonged plane ride  ? History of lower back pain and surgery in [place] 5 years ago for bulging disc at L5  · Pain regimen=aqua K, menthol rub, lidocaine patch, prn tramadol, prn oxycodone, prn IV morphine,ATC 750mg Robaxin   ? Avoid Tylenol given elevated LFTs  · x-ray of L-spine reviewed, CT T spine done, results pending  ? Patient requesting MRI of lumbar spine; consider MRI and spine surgery eval  · PT evaluation noted--they are recommending inpatient acute rehab  Patient is from [place] and this would be a considerable hardship on the patient to remain in [place] for an additional time to do rehab  Await PT re-evaluation today           Elevated LFTs   Assessment & Plan     · Patient reports known h/o mildly elevated LFTs which has been attributed to his alcohol use  · Patient currently in 12 step program for his alcohol use  · Denies abdominal pain  · Continue to monitor  Alcohol cessation            Hyponatremia   Assessment & Plan     · Very mild, monitor          Hypokalemiaresolved as of 8/27/2018   Assessment & Plan     · Repleted          Hypertension   Assessment & Plan     · BP acceptable  · Continue amlodipine           Thrombocytopenia (HCC)   Assessment & Plan     · Platelet count 24O  Possibly due to alcohol use          Per neurosurgery - Acute low back pain, concern for re-herniation   Low likelihood of discitis but will r/o with imaging      Plan:  MRI l/s with and without contrast  ESR/CRP       Discharge Plan: PT recommends post acute IP rehab

## 2018-08-27 NOTE — ASSESSMENT & PLAN NOTE
· Presented with intractable lower back pain, also with pain into his left foot after doing significant exertion in his yard and going on a prolonged plane ride  · History of lower back pain and surgery in [place] 5 years ago for bulging disc at L5  · Pain regimen=aqua K, menthol rub, lidocaine patch, prn tramadol, prn oxycodone, prn IV morphine,ATC 750mg Robaxin   · Avoid Tylenol given elevated LFTs  · x-ray of L-spine reviewed, CT T spine done, results pending  · Patient requesting MRI of lumbar spine; consider MRI and spine surgery eval  · PT evaluation noted--they are recommending inpatient acute rehab  Patient is from [place] and this would be a considerable hardship on the patient to remain in [place] for an additional time to do rehab  Await PT re-evaluation today

## 2018-08-27 NOTE — SOCIAL WORK
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  Patient does not use a pharmacy in this area  He would like [facility] for any medications at discharge  Patient has an advance directive/living will  Copy requested from patient  Patient's wife is his POA  Her name is Arabella Barnes  Her number is [phone]  Patient declined CM calling wife at this time  Wife is coming to see patient shortly and patient has been updating wife  Patient denies history of drug abuse and mental illness  Patient has a history of alcohol abuse  Patient stated he is in a 12 step program in [place] and declines resources at this time  CM discussed with patient re:DCP  Patient aware that PT is recommending inpatient rehab  Patient would like to work with PT again and is waiting for CT results  CM contact information given to patient  Will continue to f/u with patient during hospital stay  CM reviewed with patient re:outpatient observation notice  Form signed  Copy of form given to patient  All questions answered at bedside  CM name and role reviewed  Discharge Checklist reviewed and CM will continue to monitor for progress toward discharge goals in nursing and provider rounds      CM reviewed discharge planning process including the following: identifying caregivers at home, preference for d/c planning needs, [facility], availability of treatment team to discuss questions or concerns patient and/or family may have regarding diagnosis, plan of care, old or new medications and discharge planning   CM will continue to follow for care coordination and update assessment as necessary

## 2018-08-27 NOTE — CASE MANAGEMENT
Continued Stay Review  OBSERVATION    Date: 8/26/2018   Patient came in due to intractable low back pain  Patient had history of surgery approximately 5 years ago for a bulging disc at L5  This was done in [place]  According to him, he was told at that time by his surgeons, that his L3 and L4 are diseased, but did not need surgery at that time  When I saw the patient earlier today, patient was still in severe pains at the lower back with muscle spasms noted on examination  There was no radiation of the pain on his bilateral lower extremities  Vital Signs:   08/26/18 2300  99 2 °F (37 3 °C)  93  18  116/65  82  94 %  Nasal cannula  Lying       Medications:   Scheduled Meds:   Current Facility-Administered Medications:  amLODIPine 5 mg Oral Daily   levothyroxine 50 mcg Oral Early Morning   lidocaine 1 patch Transdermal Daily   melatonin 3 mg Oral HS   methocarbamol 500 mg Oral Q6H ANIA   morphine injection 2 mg Intravenous Q4H PRN   naproxen 500 mg Oral BID With Meals   ondansetron 4 mg Intravenous Q6H PRN   oxyCODONE 5 mg Oral Q4H PRN   pantoprazole 40 mg Oral Early Morning   traMADol 50 mg Oral Q6H PRN     Continuous Infusions:    PRN Meds: morphine injection 2 mg iv - used x 1 (1110)    ondansetron    oxyCODONE - used x 2 (1433;  2117)    traMADol - used x 1   Methocarbamol - used x 2 (0238;  0805)    Abnormal Labs/Diagnostic Results:   Platelets 61  Na 135  K 3 3  Cl 98  Calcium 8   ast 93  Alt 90  Albumin 2 9  Total bilirubin 1 50    Xray lumbar spine - Degenerative disease as above   No acute findings  The L5-S1 disc space is not well visualized in profile   If there is any clinical concern for discitis at L5-S1, CT would be more sensitive    8/27- platelets 55  Age/Sex: 60 y o  male     Assessment/Plan:  Back:  Positive for tenderness at the lower back with some muscle spasms noted  patient's pain medications adjusted  at that time  on reassessment pain continues     PT is going to work him up  Lumbar x-ray revealed degenerative disc disease with grade 1 retrolisthesis L3 on L4 and L4 on L5  There is no evidence of acute fracture or destructive osseous lesion  Mild to moderate multilevel disc height loss  The L5-S1 disc space is not well visualized  To visualized this, radiologist is recommending a CT scan    Thus will order for the CT scan as patient has history of surgery on his L5    Discharge Plan: post acute IP rehab recommended by PT

## 2018-08-27 NOTE — PHYSICAL THERAPY NOTE
PHYSICAL THERAPY TREATMENT NOTE  NAME:  Katherine Lu  DATE: 08/27/18    Length Of Stay: 0  Performed at least 2 patient identifiers during session: Name and ID bracelet    TREATMENT:    08/27/18 3431   Pain Assessment   Pain Assessment 0-10   Pain Score 7  (increased w/ activity, SLR)   Pain Type Acute pain   Pain Location Back   Pain Orientation Lower;Bilateral   Effect of Pain on Daily Activities limits speed and indep of mobility   Patient's Stated Pain Goal No pain   Hospital Pain Intervention(s) Medication (See MAR); Repositioned; Ambulation/increased activity; Emotional support   Restrictions/Precautions   Weight Bearing Precautions Per Order No   Other Precautions Bed Alarm; Fall Risk;Pain   General   Chart Reviewed Yes   Family/Caregiver Present Yes   Cognition   Overall Cognitive Status WFL   Orientation Level Oriented X4;Other (Comment)  (pt was identified w/ full name and birth date)   Following Commands Follows all commands and directions without difficulty   Subjective   Subjective Pt reports a "ray" came in to speak with him, but no notes in computer  Bed Mobility   Rolling R 2  Maximal assistance   Additional items Assist x 1; Increased time required;Verbal cues; Bedrails;HOB elevated   Supine to Sit 2  Maximal assistance   Additional items Assist x 1; Increased time required;Verbal cues; Bedrails;HOB elevated  (taking more than reasonable time)   Sit to Supine 2  Maximal assistance   Additional items Assist x 1; Increased time required;Verbal cues; Bedrails;HOB elevated   Transfers   Sit to Stand Unable to assess  (Pt was able to have feet on floor @ edge of bed)   Additional items (Pt unable to clear buttocks  )   Additional Comments Additional special tests: SLR + on L, negative on R  GERARDO negative, knee to chest negative  prirformis negative   Did note dififculty with L ankle DF during this assessment on LLE---akira Melendez from [facility] re: same   Ambulation/Elevation   Gait pattern Not appropriate Balance   Static Sitting Zero  (Pt unable to sit without BUE support)   Endurance Deficit   Endurance Deficit Yes   Endurance Deficit Description limited participation   Activity Tolerance   Activity Tolerance Patient limited by fatigue;Patient limited by pain   Nurse Made Aware spoke to Max Olivarez RN who premedicated pt prior to session  Assessment   Prognosis Fair   Problem List Decreased strength;Decreased range of motion;Decreased endurance;Decreased mobility;Pain; Impaired balance   Assessment Nursing premedicated pt prior to session  Pt reports feeling a little better, able to move more in the bed with LE's  Pt was successful in performing supine<>sit using a majority of bed controls, but only tolerated sitting for 3min on edge of bed  Pt was + for SLR on LLE and notified SLIM as there is no current  documentation from NS  Await further recommendations from SLIM/ NS to facilitate progression toward increased level of function  Goals adjusted below to reflect progress  Barriers to Discharge Inaccessible home environment   Goals   Patient Goals "to be able to use a walker"   [facility] Expiration Date 09/07/18   Short Term Goal #1 pt will: Increase bilateral LE strength 1 grade to facilitate independent mobility, Perform all rolling and repositioning in bed independently to minimize pain  and decrease burden of care, Complete exercise program independently to increase overall activity tolerance, Complete mobility assessment to facilitate return to previous functional and work status   Treatment Day 1   Plan   Treatment/Interventions Functional transfer training;LE strengthening/ROM; Therapeutic exercise; Endurance training;Cognitive reorientation; Bed mobility;Gait training;Equipment eval/education;Patient/family training;Spoke to nursing;Spoke to advanced practitioner  (Shayne Laboy from [place])   Progress Slow progress, decreased activity tolerance   PT Frequency 5x/wk  (f u with neurosurgery recommendations) Recommendation   Recommendation Post acute IP rehab   Equipment Recommended Other (Comment)  (TBD)     Iris Lee, PT, DPT

## 2018-08-27 NOTE — PLAN OF CARE
Problem: DISCHARGE PLANNING - CARE MANAGEMENT  Goal: Discharge to post-acute care or home with appropriate resources  INTERVENTIONS:  - Conduct assessment to determine patient/family and health care team treatment goals, and need for post-acute services based on payer coverage, community resources, and patient preferences, and barriers to discharge  - Address psychosocial, clinical, and financial barriers to discharge as identified in assessment in conjunction with the patient/family and health care team  - Arrange appropriate level of post-acute services according to patients   needs and preference and payer coverage in collaboration with the physician and health care team  - Communicate with and update the patient/family, physician, and health care team regarding progress on the discharge plan  - Arrange appropriate transportation to post-acute venues  Outcome: Progressing  LOS 0  Patient is not a bundle or a readmission  CM spoke with patient at bedside  Patient lives in [place] with his wife in a house  Patient has a house in [facility] that him and his wife visit during the year  Patient is in town to take his son to [facility]  Patient's plan was to fly home to [place] on 9/5/18  Patient is normally independent with ADLs and does not use any assistive devices at home  Patient had back surgery 5 years ago and during the plane ride had increasing pain with spasms  Patient currently drives and is self employed  Patient does not use a pharmacy in this area  He would like [facility] pharmacy for any medications at discharge  Patient has an advance directive/living will  Copy requested from patient  Patient's wife is his POA  Her name is Adelfo Pena  Her number is [phone]  Patient declined CM calling wife at this time  Wife is coming to see patient shortly and patient has been updating wife  Patient denies history of drug abuse and mental illness  Patient has a history of alcohol abuse   Patient stated he is in a 12 step program in [place] and declines resources at this time  CM discussed with patient re:DCP  Patient aware that PT is recommending inpatient rehab  Patient would like to work with PT again and is waiting for CT results  CM contact information given to patient  Will continue to f/u with patient during hospital stay  CM reviewed with patient re:outpatient observation notice  Form signed  Copy of form given to patient  All questions answered at bedside  CM name and role reviewed  Discharge Checklist reviewed and CM will continue to monitor for progress toward discharge goals in nursing and provider rounds  CM reviewed discharge planning process including the following: identifying caregivers at home, preference for d/c planning needs, [facility]s to [facility] program, availability of treatment team to discuss questions or concerns patient and/or family may have regarding diagnosis, plan of care, old or new medications and discharge planning   CM will continue to follow for care coordination and update assessment as necessary

## 2018-08-27 NOTE — PHYSICIAN ADVISOR
Current patient class: Observation  The patient is currently on Hospital Day: 3 at [address] [facility]        The patient was admitted to the hospital  on N/A at N/A for the following diagnosis:  Back pain [M54 9]  Intractable low back pain [M54 5]     After review of the relevant documentation, labs, vital signs and test results, the patient is most appropriate for OBSERVATION STATUS  Rationale is as follows: The patient is a 48-year-old male who presented to the hospital on August 25, 2018 (observation order 23:47) due to intractable low back pain  He had prior history of surgery in [place]  The pain was severe and limiting his ability to ambulate  He was placed under observation status and hospitalized, as he continued to have pain despite 16 mg of intravenous morphine in the emergency department along with lidocaine patch and Flexeril  On August 26th, the patient was evaluated by the provider twice and medication adjustments were made  A CT scan was ordered to better visualize the L5-S1 region  This showed multilevel degenerative changes with endplate changes most severe at the L5-S1 level  There was no indication of diskitis or osteomyelitis  He required one dose of intravenous morphine on this day  The patient's pain regimen has included oxycodone, tramadol, Naprosyn, Robaxin, and lidocaine transdermal   He required the one dose of intravenous morphine after emergency department stabilization, as noted  The patient is expected to remain in the hospital today for ongoing pain management and physical therapy  I do not see indication for change in status to inpatient level care at this time      The patients vitals on arrival were ED Triage Vitals [08/25/18 2125]   Temperature Pulse Respirations Blood Pressure SpO2   98 °F (36 7 °C) 80 18 138/74 100 %      Temp Source Heart Rate Source Patient Position - Orthostatic VS BP Location FiO2 (%)   Oral Monitor Sitting Right arm --      Pain Score       Worst Possible Pain           Past Medical History:   Diagnosis Date    Hyperlipidemia     Hypertension     Hypothyroidism     Lower back pain      History reviewed  No pertinent surgical history          Consults have been placed to:   IP CONSULT TO NEUROSURGERY    Vitals:    08/26/18 0748 08/26/18 1500 08/26/18 2300 08/27/18 0809   BP: 136/84 113/60 116/65 109/59   BP Location: Right arm Right arm Right arm Right arm   Pulse: 91 92 93 89   Resp: 18 18 18 18   Temp: 98 3 °F (36 8 °C) 99 1 °F (37 3 °C) 99 2 °F (37 3 °C) 98 8 °F (37 1 °C)   TempSrc: Oral Oral Oral Oral   SpO2: 97% 98% 94% 96%   Weight:           Most recent labs:    Recent Labs      08/26/18   0458  08/27/18   0523   WBC  7 25  6 25   HGB  13 2  12 7   HCT  38 4  37 9   PLT  61*  55*   K  3 3*  3 9   NA  135*  134*   CALCIUM  8 0*  8 5   BUN  12  11   CREATININE  1 27  0 97   AST  93*   --    ALT  90*   --    ALKPHOS  136*   --        Scheduled Meds:  Current Facility-Administered Medications:  amLODIPine 5 mg Oral Daily Bubba MassKIRAN   levothyroxine 50 mcg Oral Early Morning Bubba MassKIRAN   lidocaine 1 patch Transdermal Daily Idania Owens PA-C   melatonin 3 mg Oral HS Idania Owens PA-C   menthol-methyl salicylate  Apply externally 4x Daily PRN Nora Hinojosa PA-C   methocarbamol 750 mg Oral Q6H [facility] Nora Hinojosa PA-C   morphine injection 2 mg Intravenous Q4H PRN Yamilet Marquez MD   naproxen 500 mg Oral BID With Meals Yamilet Marquez MD   ondansetron 4 mg Intravenous Q6H PRN Bubba MassKIRAN   oxyCODONE 5 mg Oral Q4H PRN Yamilet Marquez MD   pantoprazole 40 mg Oral Early Morning Yamilet Marquez MD   traMADol 50 mg Oral Q6H PRN Bubba MassKIRAN     Continuous Infusions:   PRN Meds: menthol-methyl salicylate    morphine injection    ondansetron    oxyCODONE    traMADol    Surgical procedures (if appropriate):

## 2018-08-28 ENCOUNTER — APPOINTMENT (OUTPATIENT)
Dept: MRI IMAGING | Facility: HOSPITAL | Age: 58
DRG: 872 | End: 2018-08-28
Payer: COMMERCIAL

## 2018-08-28 ENCOUNTER — APPOINTMENT (INPATIENT)
Dept: RADIOLOGY | Facility: HOSPITAL | Age: 58
DRG: 872 | End: 2018-08-28
Payer: COMMERCIAL

## 2018-08-28 ENCOUNTER — APPOINTMENT (INPATIENT)
Dept: NON INVASIVE DIAGNOSTICS | Facility: HOSPITAL | Age: 58
DRG: 872 | End: 2018-08-28
Payer: COMMERCIAL

## 2018-08-28 PROBLEM — M46.46 DISCITIS OF LUMBAR REGION: Status: ACTIVE | Noted: 2018-08-28

## 2018-08-28 LAB
ANION GAP SERPL CALCULATED.3IONS-SCNC: 8 MMOL/L (ref 4–13)
BASOPHILS # BLD AUTO: 0.04 THOUSANDS/ΜL (ref 0–0.1)
BASOPHILS NFR BLD AUTO: 1 % (ref 0–1)
BUN SERPL-MCNC: 8 MG/DL (ref 5–25)
CALCIUM SERPL-MCNC: 9.1 MG/DL (ref 8.3–10.1)
CHLORIDE SERPL-SCNC: 98 MMOL/L (ref 100–108)
CO2 SERPL-SCNC: 25 MMOL/L (ref 21–32)
CREAT SERPL-MCNC: 1.02 MG/DL (ref 0.6–1.3)
EOSINOPHIL # BLD AUTO: 0.03 THOUSAND/ΜL (ref 0–0.61)
EOSINOPHIL NFR BLD AUTO: 1 % (ref 0–6)
ERYTHROCYTE [DISTWIDTH] IN BLOOD BY AUTOMATED COUNT: 13.2 % (ref 11.6–15.1)
ERYTHROCYTE [SEDIMENTATION RATE] IN BLOOD: 64 MM/HOUR (ref 0–10)
GFR SERPL CREATININE-BSD FRML MDRD: 80 ML/MIN/1.73SQ M
GLUCOSE SERPL-MCNC: 156 MG/DL (ref 65–140)
HCT VFR BLD AUTO: 35.3 % (ref 36.5–49.3)
HGB BLD-MCNC: 12.3 G/DL (ref 12–17)
IMM GRANULOCYTES # BLD AUTO: 0.07 THOUSAND/UL (ref 0–0.2)
IMM GRANULOCYTES NFR BLD AUTO: 1 % (ref 0–2)
LYMPHOCYTES # BLD AUTO: 0.41 THOUSANDS/ΜL (ref 0.6–4.47)
LYMPHOCYTES NFR BLD AUTO: 7 % (ref 14–44)
MCH RBC QN AUTO: 33.3 PG (ref 26.8–34.3)
MCHC RBC AUTO-ENTMCNC: 34.8 G/DL (ref 31.4–37.4)
MCV RBC AUTO: 96 FL (ref 82–98)
MONOCYTES # BLD AUTO: 0.88 THOUSAND/ΜL (ref 0.17–1.22)
MONOCYTES NFR BLD AUTO: 15 % (ref 4–12)
NEUTROPHILS # BLD AUTO: 4.53 THOUSANDS/ΜL (ref 1.85–7.62)
NEUTS SEG NFR BLD AUTO: 75 % (ref 43–75)
NRBC BLD AUTO-RTO: 0 /100 WBCS
PLATELET # BLD AUTO: 52 THOUSANDS/UL (ref 149–390)
PMV BLD AUTO: 9.8 FL (ref 8.9–12.7)
POTASSIUM SERPL-SCNC: 3.6 MMOL/L (ref 3.5–5.3)
RBC # BLD AUTO: 3.69 MILLION/UL (ref 3.88–5.62)
SODIUM SERPL-SCNC: 131 MMOL/L (ref 136–145)
WBC # BLD AUTO: 5.96 THOUSAND/UL (ref 4.31–10.16)

## 2018-08-28 PROCEDURE — 80048 BASIC METABOLIC PNL TOTAL CA: CPT | Performed by: PHYSICIAN ASSISTANT

## 2018-08-28 PROCEDURE — 85025 COMPLETE CBC W/AUTO DIFF WBC: CPT | Performed by: PHYSICIAN ASSISTANT

## 2018-08-28 PROCEDURE — 93306 TTE W/DOPPLER COMPLETE: CPT | Performed by: INTERNAL MEDICINE

## 2018-08-28 PROCEDURE — 73630 X-RAY EXAM OF FOOT: CPT

## 2018-08-28 PROCEDURE — 99233 SBSQ HOSP IP/OBS HIGH 50: CPT | Performed by: PHYSICIAN ASSISTANT

## 2018-08-28 PROCEDURE — 99254 IP/OBS CNSLTJ NEW/EST MOD 60: CPT | Performed by: INTERNAL MEDICINE

## 2018-08-28 PROCEDURE — 72158 MRI LUMBAR SPINE W/O & W/DYE: CPT

## 2018-08-28 PROCEDURE — 87040 BLOOD CULTURE FOR BACTERIA: CPT | Performed by: PHYSICIAN ASSISTANT

## 2018-08-28 PROCEDURE — 87147 CULTURE TYPE IMMUNOLOGIC: CPT | Performed by: PHYSICIAN ASSISTANT

## 2018-08-28 PROCEDURE — 85652 RBC SED RATE AUTOMATED: CPT | Performed by: PHYSICIAN ASSISTANT

## 2018-08-28 PROCEDURE — A9585 GADOBUTROL INJECTION: HCPCS | Performed by: PHYSICIAN ASSISTANT

## 2018-08-28 PROCEDURE — 93306 TTE W/DOPPLER COMPLETE: CPT

## 2018-08-28 RX ORDER — COLCHICINE 0.6 MG/1
0.6 TABLET ORAL DAILY
Status: DISCONTINUED | OUTPATIENT
Start: 2018-08-28 | End: 2018-08-29

## 2018-08-28 RX ORDER — SODIUM CHLORIDE 9 MG/ML
75 INJECTION, SOLUTION INTRAVENOUS CONTINUOUS
Status: DISCONTINUED | OUTPATIENT
Start: 2018-08-28 | End: 2018-08-31

## 2018-08-28 RX ORDER — DOCUSATE SODIUM 100 MG/1
100 CAPSULE, LIQUID FILLED ORAL 2 TIMES DAILY
Status: DISCONTINUED | OUTPATIENT
Start: 2018-08-28 | End: 2018-09-06 | Stop reason: HOSPADM

## 2018-08-28 RX ORDER — SENNOSIDES 8.6 MG
1 TABLET ORAL
Status: DISCONTINUED | OUTPATIENT
Start: 2018-08-28 | End: 2018-08-31

## 2018-08-28 RX ADMIN — NAPROXEN 500 MG: 250 TABLET ORAL at 09:04

## 2018-08-28 RX ADMIN — COLCHICINE 0.6 MG: 0.6 TABLET, FILM COATED ORAL at 16:41

## 2018-08-28 RX ADMIN — DOCUSATE SODIUM 100 MG: 100 CAPSULE, LIQUID FILLED ORAL at 14:06

## 2018-08-28 RX ADMIN — PANTOPRAZOLE SODIUM 40 MG: 40 TABLET, DELAYED RELEASE ORAL at 05:11

## 2018-08-28 RX ADMIN — LIDOCAINE 1 PATCH: 50 PATCH CUTANEOUS at 10:06

## 2018-08-28 RX ADMIN — OXYCODONE HYDROCHLORIDE 5 MG: 5 TABLET ORAL at 21:28

## 2018-08-28 RX ADMIN — METHOCARBAMOL 750 MG: 750 TABLET, FILM COATED ORAL at 17:38

## 2018-08-28 RX ADMIN — METHOCARBAMOL 750 MG: 750 TABLET, FILM COATED ORAL at 23:25

## 2018-08-28 RX ADMIN — OXYCODONE HYDROCHLORIDE 5 MG: 5 TABLET ORAL at 07:21

## 2018-08-28 RX ADMIN — MELATONIN TAB 3 MG 3 MG: 3 TAB at 21:28

## 2018-08-28 RX ADMIN — METHOCARBAMOL 750 MG: 750 TABLET, FILM COATED ORAL at 05:11

## 2018-08-28 RX ADMIN — NAPROXEN 500 MG: 250 TABLET ORAL at 16:41

## 2018-08-28 RX ADMIN — METHOCARBAMOL 750 MG: 750 TABLET, FILM COATED ORAL at 12:14

## 2018-08-28 RX ADMIN — LEVOTHYROXINE SODIUM 50 MCG: 50 TABLET ORAL at 05:11

## 2018-08-28 RX ADMIN — SODIUM CHLORIDE 75 ML/HR: 0.9 INJECTION, SOLUTION INTRAVENOUS at 12:18

## 2018-08-28 RX ADMIN — DOCUSATE SODIUM 100 MG: 100 CAPSULE, LIQUID FILLED ORAL at 17:38

## 2018-08-28 RX ADMIN — GADOBUTROL 7 ML: 604.72 INJECTION INTRAVENOUS at 02:22

## 2018-08-28 NOTE — ASSESSMENT & PLAN NOTE
· Presented with intractable lower back pain, also with pain into his left foot and new foot drop after doing significant exertion in his yard and going on a prolonged plane ride  · History of lower back pain and surgery in [place] 5 years ago for bulging disc at L5  · Pain regimen=aqua K, menthol rub, lidocaine patch, prn tramadol, prn oxycodone, prn IV morphine, ATC 750mg Robaxin (Avoid Tylenol given elevated LFTs)  · CT LS spine showed L5S1 disc herniation  · MRI LS spine= spoke with Radiology, evidence consistent with discitis at L5-S1  I spoke directly with the neuro surgical attending and he gave me a list of recommendations to include request for blood cultures, 2D echocardiogram, Infectious Disease consultation, foot xray, IR consult for biopsy; they did not feel he needs to be transferred to \[facility]\"" at this point  Patient did report to Nursing after I left that he did have antibiotic treatment for a suspected foot infection in July from his PCP  · ESR and CRP noted to be elevated, consistent with discitis  · PT evaluation noted--they are recommending inpatient acute rehab  Patient is from [place] but is willing to do short term acute rehab   He is not medically ready for discharge

## 2018-08-28 NOTE — PROGRESS NOTES
Vascular and Interventional Radiology brief note    Aware of consult for L5-S1 biopsy  Discussed with Dr Collin Bowling regarding biopsy target  Neurosurgery would like the phlegmonous change along the anterior aspect of L5 to be sampled  According to the imaging that I currently have available to me, this does not appear to be easily accessible without transecting nerve from a posterior approach  Per our discussion, there is minimal concern regarding the bone and the disc itself  It is the soft tissues that are the concern  Dr Pradip Herrera will review the imaging and the patient with Dr Nickie Perez and get back in touch with me regarding plan going forward

## 2018-08-28 NOTE — PHYSICIAN ADVISOR
Please refer to the previous physician advisor note  The case was reviewed  New data became available  CRP is greater than 90 and ESR is 64  Blood cultures have been ordered  Infectious diskitis will need to be further excluded  Interventional Radiology has been consulted for possible biopsy  Neurosurgery has evaluated the patient  I also see notation that he has a new foot drop  He has continued pain and significant debility, per the note  At this point, the patient has become appropriate for inpatient level care

## 2018-08-28 NOTE — PROGRESS NOTES
Progress Note - Kellen Jaramillogood 1960, 60 y o  male MRN: [id number]    Unit/Bed#: -01 Encounter: [id number]    Primary Care Provider: No primary care provider on file  Date and time admitted to hospital: 8/25/2018  9:25 PM    * Intractable low back pain   Assessment & Plan    · Presented with intractable lower back pain, also with pain into his left foot and new foot drop after doing significant exertion in his yard and going on a prolonged plane ride  · History of lower back pain and surgery in [facility] 5 years ago for bulging disc at L5  · Pain regimen=aqua K, menthol rub, lidocaine patch, prn tramadol, prn oxycodone, prn IV morphine, ATC 750mg Robaxin (Avoid Tylenol given elevated LFTs)  · CT LS spine showed L5S1 disc herniation  · MRI LS spine= spoke with Radiology, evidence consistent with discitis at L5-S1  I spoke directly with the neuro surgical attending and he gave me a list of recommendations to include request for blood cultures, 2D echocardiogram, Infectious Disease consultation, foot xray, IR consult for biopsy; they did not feel he needs to be transferred to \[facility]\"" at this point  Patient did report to Nursing after I left that he did have antibiotic treatment for a suspected foot infection in July from his PCP  · ESR and CRP noted to be elevated, consistent with discitis  · PT evaluation noted--they are recommending inpatient acute rehab  Patient is from [place] but is willing to do short term acute rehab  He is not medically ready for discharge        Elevated LFTs   Assessment & Plan    · Patient reports known h/o mildly elevated LFTs which has been attributed to his alcohol use  · Patient currently in 12 step program for his alcohol use  · Denies abdominal pain  · Continue to monitor  Alcohol cessation           Hyponatremia   Assessment & Plan    · Very mild, monitor        Hypokalemiaresolved as of 8/27/2018   Assessment & Plan    · Repleted, recheck        Hypertension Assessment & Plan    · BP running low  · Stop amlodipine and monitor        Thrombocytopenia (HCC)   Assessment & Plan    · Platelet count 06H  Likely due to alcohol use  Not receiving any blood thinners  Follow counts            VTE Pharmacologic Prophylaxis:   Pharmacologic: Pharmacologic VTE Prophylaxis contraindicated due to low platelets  Mechanical VTE Prophylaxis in Place:no    Patient Centered Rounds: I have performed bedside rounds with nursing staff today  Discussions with Specialists or Other Care Team Provider: PT, case mgmt, radiology, utilization review, Neurosurgery    Education and Discussions with Family / Patient: Will speak with his wife when she arrives later    Time Spent for Care: 45 minutes  More than 50% of total time spent on counseling and coordination of care as described above  Current Length of Stay: 0 day(s)    Current Patient Status: Inpatient   Certification Statement: The patient remained on observation status since August 25th per recommendations from utilization review  However, he is now felt to be appropriate to transition to inpatient status based on evaluation and treatment of diskitis  Discharge Plan: STR when medically stable  However at this point we do not expect he will be discharged the for the next 72 hr based on his diskitis workup    Code Status: Level 1 - Full Code      Subjective:   Patient continues to have pain and says he really isn't feeling well  He has only noticed the weakness and pain in his left foot since arriving here and continues to have back pain  He states the Robaxin is helping but the oxycodone helps more for his pain  Nursing reports concerned that his blood pressure has been running on the lower side      Neurosurgery gave me a list of questions they wanted answered from the patient including the following:  He denies any recent significant dental work, skin abscesses, cuts or rashes, bites, areas of infection around his skin/perianal or perirectal infection, IV drug abuse, history of diabetes, immune compromised state, or heart conditions  He states the only issue he has really been trying to work through is alcohol cessation  Objective:     Vitals:   Temp (24hrs), Av 3 °F (36 8 °C), Min:98 1 °F (36 7 °C), Max:98 5 °F (36 9 °C)    HR:  [81-91] 90  Resp:  [18] 18  BP: ()/(51-59) 99/52  SpO2:  [93 %-97 %] 94 %  There is no height or weight on file to calculate BMI  Input and Output Summary (last 24 hours): Intake/Output Summary (Last 24 hours) at 18 1248  Last data filed at 18 1635   Gross per 24 hour   Intake              480 ml   Output              300 ml   Net              180 ml       Physical Exam:     Physical Exam   Constitutional: He appears well-developed and well-nourished  No distress  HENT:   Head: Normocephalic and atraumatic  Eyes: Conjunctivae are normal  Right eye exhibits no discharge  Left eye exhibits no discharge  No scleral icterus  Neck: Neck supple  Cardiovascular: Normal rate and regular rhythm  Murmur (Soft murmur?) heard  Pulmonary/Chest: Effort normal and breath sounds normal  No respiratory distress  He has no wheezes  He has no rales  Abdominal: Soft  Bowel sounds are normal  He exhibits no distension  There is no tenderness  There is no rebound  Musculoskeletal: He exhibits tenderness (left foot great toe/medial area pain)  He exhibits no edema  Neurological: He is alert  Patient is awake alert and interactive but seems to be cognitively a bit more "foggy" than previous times I have spoken to him, likely as a result of receiving pain medication   Skin: Skin is warm and dry  No rash noted  He is not diaphoretic  No erythema  No pallor  No areas of cellulitis, abscess, or paronychia   Psychiatric:   Calm and cooperative   Vitals reviewed        Additional Data:     Labs:      Results from last 7 days  Lab Units 18  0523  18  0007   WBC Thousand/uL 6 25  < > 8 90   HEMOGLOBIN g/dL 12 7  < > 13 1   HEMATOCRIT % 37 9  < > 37 4   PLATELETS Thousands/uL 55*  < > 72*   NEUTROS PCT %  --   --  85*   LYMPHS PCT %  --   --  6*   MONOS PCT %  --   --  9   EOS PCT %  --   --  0   < > = values in this interval not displayed  Results from last 7 days  Lab Units 08/27/18  0523 08/26/18  0458   SODIUM mmol/L 134* 135*   POTASSIUM mmol/L 3 9 3 3*   CHLORIDE mmol/L 102 98*   CO2 mmol/L 24 25   BUN mg/dL 11 12   CREATININE mg/dL 0 97 1 27   CALCIUM mg/dL 8 5 8 0*   ALK PHOS U/L  --  136*   ALT U/L  --  90*   AST U/L  --  93*                   * I Have Reviewed All Lab Data Listed Above  * Additional Pertinent Lab Tests Reviewed: No New Labs Available For Today    Imaging:    Imaging Reports Reviewed Today Include: MRI  Imaging Personally Reviewed by Myself Includes:      Recent Cultures (last 7 days):           Last 24 Hours Medication List:     Current Facility-Administered Medications:  levothyroxine 50 mcg Oral Early Morning Abida Lauren PA-C    lidocaine 1 patch Transdermal Daily Abida Lauren PA-C    melatonin 3 mg Oral HS Idania Owens PA-C    menthol-methyl salicylate  Apply externally 4x Daily PRN Nora Hinojosa PA-C    methocarbamol 750 mg Oral Q6H [facility] Nora Hinojosa PA-C    morphine injection 2 mg Intravenous Q4H PRN Yamilet Marquez MD    naproxen 500 mg Oral BID With Meals Yamilet Marquez MD    ondansetron 4 mg Intravenous Q6H PRN Abida Lauren PA-C    oxyCODONE 5 mg Oral Q4H PRN Yamilet Marquez MD    pantoprazole 40 mg Oral Early Morning Yamilet Marquez MD    sodium chloride 75 mL/hr Intravenous Continuous Nora Hinojosa PA-C Last Rate: 75 mL/hr (08/28/18 1218)   traMADol 50 mg Oral Q6H PRN Abida Lauren PA-C         Today, Patient Was Seen By: Ej Batista PA-C    ** Please Note: Dictation voice to text software may have been used in the creation of this document   **

## 2018-08-28 NOTE — CONSULTS
Consultation - Infectious Disease   Marin Cabral 60 y o  male MRN: [id number]  Unit/Bed#: -01 Encounter: [id number]      IMPRESSION & RECOMMENDATIONS:   Impression/Recommendations:  1  Abnormal MRI lumbar spine  Patient presented with acute low back pain  X-ray and CT more suggestive of degenerative changes  MRI showing enhancement at L5-S1 disc space, diskitis cannot be excluded  ESR elevated at 64, CRP greater than 90  Patient with no associated symptoms such as fevers or chills  No leukocytosis  At this point, unclear if we are dealing with infectious process or not  Patient has remained clinically well off antibiotics  -continue to observe off antibiotics for now  -recommend IR guided biopsy  Would send for bacterial, fungal, AFB cultures   -check blood cultures x2 sets today  -neurosurgery follow-up ongoing    2  Acute low back pain  Likely related to #1  MRI does show mild bulge at L4  Also moderate narrowing at L4-5 and L5-S1  This may be the cause of back pain  However, there was also enhancement noted at L5-S1 and cannot rule out diskitis  As stated above, we need to rule out infection  No recent trauma  -plan as above  -pain management per primary team  -monitor back pain in neuro exam    3  Abnormal LFTs  As per patient, this is not a new finding  May be related to his history of extensive alcohol abuse  Recommend outpatient workup and monitoring  4   Thrombocytopenia  Platelets on low side but remains stable  This may also be related to extensive alcohol abuse history  Patient is not from this area so has not had any prior imaging done here  No associated fevers, chills to suggest acute infectious process  5   Lumbar disc herniation with spinal stenosis  With history L5 laminectomy about 5 years ago  Antibiotics:  None    Discussed above plan with Nora sellers [facility]  Discussed plan with patient and his family at bedside  Thank you for this consultation  We will follow along with you  HISTORY OF PRESENT ILLNESS:  Reason for Consult:  Possible diskitis    HPI: Willie Falcon is a 60 y o  male with prior history of bulging disc at L5 post L5 laminectomy about 5 years ago who was admitted on 08/25/2018 due to severe low back pain  No radiation down the legs or numbness down the legs  Patient does state that prior to the onset of his back pain, he was outdoors using a sludge hammer  He did not know any trauma to the back at the time  Initial lumbar x-ray suggestive of degenerative disc disease involving the lumbar spine  MRI now suggestive of possible diskitis at the L4-S1 disc space  Patient denies any other associated symptoms such as fevers, chills  No nausea, vomiting, diarrhea, rashes, other joint pain, shortness of breath  Of note, patient does mention that he was on oral Keflex from 07/09 to 07/17/2018 due to a suspected left plantar foot cellulitis  Patient is originally from [facility] and he is here visiting family in the area  Admits to many mosquito bites in [place]  No known tick bites  No animal exposures  No recent foreign travel  Patient also has an extensive alcohol abuse history  He is currently in the 12 step program     REVIEW OF SYSTEMS:  A complete system-based review of systems is otherwise negative  PAST MEDICAL HISTORY:  Past Medical History:   Diagnosis Date    Hyperlipidemia     Hypertension     Hypothyroidism     Lower back pain      History reviewed  No pertinent surgical history  FAMILY HISTORY:  Non-contributory    SOCIAL HISTORY:  History   Alcohol Use    9 6 oz/week    16 Cans of beer per week     History   Drug Use No     History   Smoking Status    Never Smoker   Smokeless Tobacco    Never Used       ALLERGIES:  No Known Allergies    MEDICATIONS:  All current active medications have been reviewed      PHYSICAL EXAM:  Vitals:  HR:  [81-91] 90  Resp:  [18] 18  BP: ()/(51-59) 99/52  SpO2:  [93 %-97 %] 94 %  Temp (24hrs), Av 3 °F (36 8 °C), Min:98 1 °F (36 7 °C), Max:98 5 °F (36 9 °C)  Current: Temperature: 98 3 °F (36 8 °C)     Physical Exam:  General:  Well-nourished, well-developed, in no acute distress  Eyes:  Conjunctive clear with no hemorrhages or effusions  Oropharynx:  No ulcers, no lesions  Neck:  Supple, no lymphadenopathy  Lungs:  Clear to auscultation bilaterally, no accessory muscle use  Cardiac:  Regular rate and rhythm, no murmurs  Abdomen:  Soft, non-tender, non-distended  Extremities:  No peripheral cyanosis, clubbing, or edema  Mild lumbar spine pinpoint tenderness to palpation  Skin:  No rashes, no ulcers  Neurological:  Moves all four extremities spontaneously, left foot drop      LABS, IMAGING, & OTHER STUDIES:  Lab Results:  I have personally reviewed pertinent labs  Results from last 7 days  Lab Units 18  05188 18  0007   SODIUM mmol/L 134* 135* 133*   POTASSIUM mmol/L 3 9 3 3* 3 3*   CHLORIDE mmol/L 102 98* 98*   CO2 mmol/L 24 25 21   BUN mg/dL 11 12 12   CREATININE mg/dL 0 97 1 27 1 09   EGFR ml/min/1 73sq m 85 61 74   CALCIUM mg/dL 8 5 8 0* 8 4   AST U/L  --  93* 116*   ALT U/L  --  90* 103*   ALK PHOS U/L  --  136* 143*       Results from last 7 days  Lab Units 18  05188 18  0007   WBC Thousand/uL 6 25 7 25 8 90   HEMOGLOBIN g/dL 12 7 13 2 13 1   PLATELETS Thousands/uL 55* 61* 72*       Imaging Studies:   I have personally reviewed pertinent imaging study reports and images in PACS  MRI of the lumbar spine showed L5-S1 disc space enhancement with fluid signal within the L5-S1 disc  Diskitis cannot be excluded  EKG, Pathology, and Other Studies:   I have personally reviewed pertinent reports

## 2018-08-29 ENCOUNTER — APPOINTMENT (INPATIENT)
Dept: MRI IMAGING | Facility: HOSPITAL | Age: 58
DRG: 872 | End: 2018-08-29
Payer: COMMERCIAL

## 2018-08-29 PROBLEM — F10.10 ALCOHOL ABUSE: Status: ACTIVE | Noted: 2018-08-29

## 2018-08-29 PROBLEM — N17.9 AKI (ACUTE KIDNEY INJURY) (HCC): Status: ACTIVE | Noted: 2018-08-29

## 2018-08-29 PROBLEM — M46.47 DISCITIS OF LUMBOSACRAL REGION: Status: ACTIVE | Noted: 2018-08-26

## 2018-08-29 PROBLEM — A41.9 SEPSIS (HCC): Status: ACTIVE | Noted: 2018-08-29

## 2018-08-29 LAB
ANION GAP SERPL CALCULATED.3IONS-SCNC: 9 MMOL/L (ref 4–13)
BUN SERPL-MCNC: 12 MG/DL (ref 5–25)
CALCIUM SERPL-MCNC: 8.7 MG/DL (ref 8.3–10.1)
CHLORIDE SERPL-SCNC: 99 MMOL/L (ref 100–108)
CO2 SERPL-SCNC: 26 MMOL/L (ref 21–32)
CREAT SERPL-MCNC: 1.53 MG/DL (ref 0.6–1.3)
FOLATE SERPL-MCNC: 13.9 NG/ML (ref 3.1–17.5)
GFR SERPL CREATININE-BSD FRML MDRD: 49 ML/MIN/1.73SQ M
GLUCOSE SERPL-MCNC: 109 MG/DL (ref 65–140)
LACTATE SERPL-SCNC: 1.8 MMOL/L (ref 0.5–2)
POTASSIUM SERPL-SCNC: 3.4 MMOL/L (ref 3.5–5.3)
PROCALCITONIN SERPL-MCNC: 2.18 NG/ML
SODIUM SERPL-SCNC: 134 MMOL/L (ref 136–145)

## 2018-08-29 PROCEDURE — 82746 ASSAY OF FOLIC ACID SERUM: CPT | Performed by: PHYSICIAN ASSISTANT

## 2018-08-29 PROCEDURE — A9585 GADOBUTROL INJECTION: HCPCS | Performed by: PHYSICIAN ASSISTANT

## 2018-08-29 PROCEDURE — 99233 SBSQ HOSP IP/OBS HIGH 50: CPT | Performed by: INTERNAL MEDICINE

## 2018-08-29 PROCEDURE — 99231 SBSQ HOSP IP/OBS SF/LOW 25: CPT | Performed by: NEUROLOGICAL SURGERY

## 2018-08-29 PROCEDURE — 73720 MRI LWR EXTREMITY W/O&W/DYE: CPT

## 2018-08-29 PROCEDURE — 80048 BASIC METABOLIC PNL TOTAL CA: CPT | Performed by: PHYSICIAN ASSISTANT

## 2018-08-29 PROCEDURE — 83605 ASSAY OF LACTIC ACID: CPT | Performed by: PHYSICIAN ASSISTANT

## 2018-08-29 PROCEDURE — 84145 PROCALCITONIN (PCT): CPT | Performed by: PHYSICIAN ASSISTANT

## 2018-08-29 RX ORDER — POTASSIUM CHLORIDE 20 MEQ/1
20 TABLET, EXTENDED RELEASE ORAL ONCE
Status: COMPLETED | OUTPATIENT
Start: 2018-08-29 | End: 2018-08-29

## 2018-08-29 RX ORDER — THIAMINE MONONITRATE (VIT B1) 100 MG
100 TABLET ORAL DAILY
Status: DISCONTINUED | OUTPATIENT
Start: 2018-08-29 | End: 2018-09-06 | Stop reason: HOSPADM

## 2018-08-29 RX ORDER — ALBUMIN, HUMAN INJ 5% 5 %
12.5 SOLUTION INTRAVENOUS ONCE
Status: COMPLETED | OUTPATIENT
Start: 2018-08-29 | End: 2018-08-29

## 2018-08-29 RX ADMIN — LEVOTHYROXINE SODIUM 50 MCG: 50 TABLET ORAL at 05:37

## 2018-08-29 RX ADMIN — CEFAZOLIN SODIUM 2000 MG: 2 SOLUTION INTRAVENOUS at 13:36

## 2018-08-29 RX ADMIN — DOCUSATE SODIUM 100 MG: 100 CAPSULE, LIQUID FILLED ORAL at 08:07

## 2018-08-29 RX ADMIN — CEFAZOLIN SODIUM 2000 MG: 2 SOLUTION INTRAVENOUS at 20:49

## 2018-08-29 RX ADMIN — METHOCARBAMOL 750 MG: 750 TABLET, FILM COATED ORAL at 05:37

## 2018-08-29 RX ADMIN — METHOCARBAMOL 750 MG: 750 TABLET, FILM COATED ORAL at 17:07

## 2018-08-29 RX ADMIN — MELATONIN TAB 3 MG 3 MG: 3 TAB at 21:48

## 2018-08-29 RX ADMIN — TRAMADOL HYDROCHLORIDE 50 MG: 50 TABLET, COATED ORAL at 20:49

## 2018-08-29 RX ADMIN — SODIUM CHLORIDE 500 ML: 0.9 INJECTION, SOLUTION INTRAVENOUS at 08:55

## 2018-08-29 RX ADMIN — METHOCARBAMOL 750 MG: 750 TABLET, FILM COATED ORAL at 12:07

## 2018-08-29 RX ADMIN — SODIUM CHLORIDE 125 ML/HR: 0.9 INJECTION, SOLUTION INTRAVENOUS at 17:29

## 2018-08-29 RX ADMIN — SODIUM CHLORIDE 1000 ML: 0.9 INJECTION, SOLUTION INTRAVENOUS at 09:59

## 2018-08-29 RX ADMIN — ALBUMIN HUMAN 12.5 G: 0.05 INJECTION, SOLUTION INTRAVENOUS at 16:21

## 2018-08-29 RX ADMIN — VANCOMYCIN HYDROCHLORIDE 1250 MG: 1 INJECTION, POWDER, LYOPHILIZED, FOR SOLUTION INTRAVENOUS at 09:22

## 2018-08-29 RX ADMIN — PANTOPRAZOLE SODIUM 40 MG: 40 TABLET, DELAYED RELEASE ORAL at 05:37

## 2018-08-29 RX ADMIN — POTASSIUM CHLORIDE 20 MEQ: 1500 TABLET, EXTENDED RELEASE ORAL at 10:14

## 2018-08-29 RX ADMIN — METHOCARBAMOL 750 MG: 750 TABLET, FILM COATED ORAL at 23:21

## 2018-08-29 RX ADMIN — COLCHICINE 0.6 MG: 0.6 TABLET, FILM COATED ORAL at 08:07

## 2018-08-29 RX ADMIN — SENNOSIDES 8.6 MG: 8.6 TABLET, FILM COATED ORAL at 21:48

## 2018-08-29 RX ADMIN — SODIUM CHLORIDE 500 ML: 0.9 INJECTION, SOLUTION INTRAVENOUS at 14:45

## 2018-08-29 RX ADMIN — LIDOCAINE 1 PATCH: 50 PATCH CUTANEOUS at 10:14

## 2018-08-29 RX ADMIN — GADOBUTROL 6 ML: 604.72 INJECTION INTRAVENOUS at 18:42

## 2018-08-29 RX ADMIN — Medication 100 MG: at 10:14

## 2018-08-29 NOTE — CONSULTS
Lydai Echeverria   1:04 PM  8/29/2018    Pt with likely discitis at L5-S1 due to prior foot infection a last month  Has +bld cultures doing well some improvement in pain sx  Exam remains stable LLE 4+/5 PF/DF otherwise 5/5 throughout      Plan:  Cont abx await susceptibilities  Recommend LSO brace for comfort  Can be OOB  No need for bx at this time given culture results  Pt expresses desire to go back to [facility], recommend establishing f/u with spine surgeon and ID there to follow discitis

## 2018-08-29 NOTE — PROGRESS NOTES
3:25 pm spoke with critical care AP and my attending  He remains clinically well despite low blood pressure  Continue IVF  Consider transfer to [facility] if continued issues with blood pressure  Wife requested another call-I spoke with her again and with his son as well  Many questions and concerns addressed as they are very worried about the current severity of the situation  Will keep them closely updated  Addendum 11:30 a m  I spoke with Infectious Disease  I will place an order for an MRI of the foot  Spoke with nursing team several times this morning regarding blood pressure  Patient remains asymptomatic at this time with no complaints of lightheadedness and last blood pressure recorded systolic 85  Continue aggressive IV fluids and blood pressure monitoring  Lactic acid noted to be normal   I did update the Critical Care advanced practitioner regarding this patient  Also spoke with Neurosurgery  Time spent today 60 min    Progress Note - Mikayla Ambrocio 1960, 60 y o  male MRN: [id number]    Unit/Bed#: -01 Encounter: [id number]    Primary Care Provider: No primary care provider on file  Date and time admitted to hospital: 8/25/2018  9:25 PM  * Discitis of lumbosacral region   Assessment & Plan    · Presented with intractable lower back pain, also with pain into his left foot and new foot drop after doing significant exertion in his yard and going on a prolonged plane ride  · History of lower back pain and surgery in [place] 5 years ago for bulging disc at L5  · Pain regimen=aqua K, menthol rub, lidocaine patch, prn tramadol, prn oxycodone, prn IV morphine, ATC 750mg Robaxin (Avoid Tylenol given elevated LFTs)  · CT LS spine showed L5S1 disc herniation  · MRI LS spine= spoke with Radiology on 8/28, evidence consistent with discitis at L5-S1  ESR and CRP noted to be elevated, consistent with discitis   Initially planned for IR biopsy but now can be cancelled given positive blood cultures  · Appreciate Infectious Disease follow-up  · Neurosurgery also involved  · PT evaluation noted--they are recommending inpatient acute rehab  Patient is from [place] but is willing to do short term acute rehab  He is not medically ready for discharge        YONATAN (acute kidney injury) [facility])   Assessment & Plan    · Suspect this was due to NSAIDs and hypotension/sepsis  Will provide aggressive IV fluids and recheck in a m  Sepsis ([address] )   Assessment & Plan    · Patient with new hypotension and new YONATAN, give fluid bolus stat and check lactic acid and procalcitonin  · ID following  · Unclear source? ?recent left foot infection  Xray showed erosion, MRI? Podiatry eval?  · Blood cultures 2/2 positive for GPC in chains  · 2d echo unremarkable  · Start IV vanco immediately, pending final culture and sensitivities  · Anticipate patient will require a prolonged course of IV antibiotics  Case management is following  This will need to be discussed certainly in light of the fact that patient is from [place] and not living locally  Repeat blood cultures will be obtained at the discretion of ID        Hyponatremia   Assessment & Plan    · Improved, follow        Hypokalemia   Assessment & Plan    · Replete--follow        Alcohol abuse   Assessment & Plan    · Last drink prior to coming to [place]  Not demonstrating any current withdrawal symptoms  · In 12 step program but has not remained consistently sober  · Add thiamine and folic acid and check A62        Elevated LFTs   Assessment & Plan    · Patient reports known h/o mildly elevated LFTs which has been attributed to his alcohol use  Probable underlying cirrhosis  · Denies abdominal pain  · Continue to monitor        Thrombocytopenia (HCC)   Assessment & Plan    · Platelet count 06O  Likely due to alcohol use  Not receiving any blood thinners    Follow counts, as these can also drop with sepsis            VTE Pharmacologic Prophylaxis: Pharmacologic: Pharmacologic VTE Prophylaxis contraindicated due to low platelet  Mechanical VTE Prophylaxis in Place: No    Patient Centered Rounds: I have performed bedside rounds with nursing staff today  Discussions with Specialists or Other Care Team Provider:  Spoke with Interventional Radiology, podiatry curbside, physical therapy, case management, Infectious Disease, also reached out to Neurosurgery awaiting a call back    Education and Discussions with Family / Patient: 1:53 pm called wife with update    Time Spent for Care: 40 min  More than 50% of total time spent on counseling and coordination of care as described above  Current Length of Stay: 1 day(s)    Current Patient Status: Inpatient   Certification Statement: The patient will continue to require additional inpatient hospital stay due to Sepsis workup    Discharge Plan:  Anticipate he will need short-term rehab  Also will require prolonged antibiotics  Will not be stable to return to [place] for the foreseeable future    Code Status: Level 1 - Full Code    Subjective:   Patient says he was trying to get up out of bed to the chair but became lightheaded  Nursing alerted me that his blood pressure dropped into the 70 systolic range  Patient is not reporting any fever or chills  He does think his left foot might feels slightly better than yesterday  He admits that he was trying to self treat a planter's wart and used over-the-counter agent and burned" through his skin until it bled (he believes there was an open area at some point)  He states this created even more pain in the area  He denies any known history of gout but states his doctor thought he might have had it in July when he was having a lot of foot pain  He denies any recent back injections  He does state he had a colonoscopy 10 days ago with polyps removed  He also tells me his last drink was 6 days ago      Objective:     Vitals:   Temp (24hrs), Av 6 °F (37 °C), Min:98 °F (36 7 °C), Max:99 2 °F (37 3 °C)    HR:  [74-93] 74  Resp:  [16-18] 16  BP: ()/(44-58) 76/44  SpO2:  [94 %-98 %] 98 %  There is no height or weight on file to calculate BMI  Input and Output Summary (last 24 hours): Intake/Output Summary (Last 24 hours) at 08/29/18 0909  Last data filed at 08/29/18 0844   Gross per 24 hour   Intake              480 ml   Output             1100 ml   Net             -620 ml       Physical Exam:     Physical Exam   Constitutional: He appears well-developed and well-nourished  No distress  HENT:   Head: Normocephalic and atraumatic  Mouth/Throat: No oropharyngeal exudate  Eyes: Conjunctivae are normal  Right eye exhibits no discharge  Left eye exhibits no discharge  No scleral icterus  Neck: Neck supple  Cardiovascular: Normal rate, regular rhythm and normal heart sounds  No murmur heard  Pulmonary/Chest: Effort normal and breath sounds normal  No respiratory distress  He has no wheezes  He has no rales  Abdominal: Soft  He exhibits no distension  There is no tenderness  Musculoskeletal: He exhibits no edema  No warmth or erythema noted of the left great toe  Less tenderness than yesterday  No open wounds appreciated  Neurological: He is alert  Awake alert and interactive, no tremor   Skin: Skin is warm and dry  No rash noted  He is not diaphoretic  No erythema  No pallor  Psychiatric:   Pleasant and cooperative   Vitals reviewed        Additional Data:     Labs:      Results from last 7 days  Lab Units 08/28/18  1358   WBC Thousand/uL 5 96   HEMOGLOBIN g/dL 12 3   HEMATOCRIT % 35 3*   PLATELETS Thousands/uL 52*   NEUTROS PCT % 75   LYMPHS PCT % 7*   MONOS PCT % 15*   EOS PCT % 1       Results from last 7 days  Lab Units 08/29/18  0542  08/26/18  0458   SODIUM mmol/L 134*  < > 135*   POTASSIUM mmol/L 3 4*  < > 3 3*   CHLORIDE mmol/L 99*  < > 98*   CO2 mmol/L 26  < > 25   BUN mg/dL 12  < > 12   CREATININE mg/dL 1 53*  < > 1 27 CALCIUM mg/dL 8 7  < > 8 0*   ALK PHOS U/L  --   --  136*   ALT U/L  --   --  90*   AST U/L  --   --  93*   < > = values in this interval not displayed  * I Have Reviewed All Lab Data Listed Above  * Additional Pertinent Lab Tests Reviewed: All Labs For Current Hospital Admission Reviewed    Imaging:    Imaging Reports Reviewed Today Include: Foot x-ray, 2D echo  Imaging Personally Reviewed by Myself Includes: Foot x-ray    Recent Cultures (last 7 days):       Results from last 7 days  Lab Units 08/28/18  1501 08/28/18  1358   GRAM STAIN RESULT  Gram positive cocci in chains Gram positive cocci in chains       Last 24 Hours Medication List:     Current Facility-Administered Medications:  docusate sodium 100 mg Oral BID Nora Hinojosa PA-C    levothyroxine 50 mcg Oral Early Morning Ophelia Zuñiga PA-C    lidocaine 1 patch Transdermal Daily Ophelia Zuñiga PA-C    melatonin 3 mg Oral HS Ophelia Zuñiga PA-C    menthol-methyl salicylate  Apply externally 4x Daily PRN Nora Hinojosa PA-C    methocarbamol 750 mg Oral Q6H [facility] Nora Hinojosa PA-C    morphine injection 2 mg Intravenous Q4H PRN Yamilet Marquez MD    ondansetron 4 mg Intravenous Q6H PRN Ophelia Zuñiga PA-C    oxyCODONE 5 mg Oral Q4H PRN Yamilet Marquez MD    pantoprazole 40 mg Oral Early Morning Yamilet Marquez MD    potassium chloride 20 mEq Oral Once Nora Hinojosa PA-C    senna 1 tablet Oral HS Nora Hinojosa PA-C    sodium chloride 500 mL Intravenous Once Nora Hinojosa PA-C    sodium chloride 100 mL/hr Intravenous Continuous Nora Hinojosa PA-C Last Rate: 100 mL/hr (08/29/18 0817)   thiamine 100 mg Oral Daily Nora Hinojosa PA-C    traMADol 50 mg Oral Q6H PRN Ophelia Zuñiga PA-C    vancomycin 15 mg/kg Intravenous Q12H Nora Hinojosa PA-C         Today, Patient Was Seen By: Yobani Weston PA-C    ** Please Note: Dictation voice to text software may have been used in the creation of this document   **

## 2018-08-29 NOTE — ASSESSMENT & PLAN NOTE
· Patient reports known h/o mildly elevated LFTs which has been attributed to his alcohol use  Probable underlying cirrhosis  · Denies abdominal pain     · Continue to monitor

## 2018-08-29 NOTE — ASSESSMENT & PLAN NOTE
· Patient with new hypotension and new YONATAN, give fluid bolus stat and check lactic acid and procalcitonin  · ID following  · Unclear source? ?recent left foot infection  Xray showed erosion, MRI? Podiatry eval?  · Blood cultures 2/2 positive for GPC in chains  · 2d echo unremarkable  · Start IV vanco immediately, pending final culture and sensitivities  · Anticipate patient will require a prolonged course of IV antibiotics  Case management is following  This will need to be discussed certainly in light of the fact that patient is from [place] and not living locally    Repeat blood cultures will be obtained at the discretion of ID

## 2018-08-29 NOTE — ASSESSMENT & PLAN NOTE
· Presented with intractable lower back pain, also with pain into his left foot and new foot drop after doing significant exertion in his yard and going on a prolonged plane ride  · History of lower back pain and surgery in [place] 5 years ago for bulging disc at L5  · Pain regimen=aqua K, menthol rub, lidocaine patch, prn tramadol, prn oxycodone, prn IV morphine, ATC 750mg Robaxin (Avoid Tylenol given elevated LFTs)  · CT LS spine showed L5S1 disc herniation  · MRI LS spine= spoke with Radiology on 8/28, evidence consistent with discitis at L5-S1  ESR and CRP noted to be elevated, consistent with discitis  Initially planned for IR biopsy but now can be cancelled given positive blood cultures  · Appreciate Infectious Disease follow-up  · Neurosurgery also involved  · PT evaluation noted--they are recommending inpatient acute rehab  Patient is from [place] but is willing to do short term acute rehab   He is not medically ready for discharge

## 2018-08-29 NOTE — PHYSICAL THERAPY NOTE
Physical Therapy Cancellation Note        Chart check performed  Pt is pending possible biopsy of L5-S1  IR is awaiting input from neurosurgery  Will await potential biopsy and proceed w/ PT intervention as appropriate      Sophia Parrish, PT

## 2018-08-29 NOTE — PHYSICAL THERAPY NOTE
Physical Therapy Cancellation Note      Spoke to [facility] via phone  Stated she would speak to neurosurgery and provide out of bed orders as appropriate  She also reported that pt is presently hypotensive and not appropriate for PT session  Will follow and continue tx as appropriate      Selena Castle, PT

## 2018-08-29 NOTE — PROGRESS NOTES
Progress Note - Infectious Disease   Fady Steiner 60 y o  male MRN: [id number]  Unit/Bed#: -01 Encounter: [id number]      Impression/Recommendations:  1  Group B strep bacteremia  Blood cultures checked yesterday are both positive for group B strep  Likely the explanation for abnormal MRI findings  TTE is negative for endocarditis  Fortunately, patient remains clinically well, nontoxic     -continue high-dose cefazolin 2 g IV q 8 hours  -check repeat blood cultures x2 sets in the a m   -hold off on PICC line placement until we ensure clearance of bacteremia     2  Abnormal MRI lumbar spine  Patient presented with acute low back pain  X-ray and CT more suggestive of degenerative changes  MRI showing enhancement at L5-S1 disc space, diskitis cannot be excluded  ESR elevated at 64, CRP greater than 90  Patient with no associated symptoms such as fevers or chills  No leukocytosis  Now patient has group B strep in blood which can be the explanation for diskitis     -antibiotic plan as above  -will likely require prolonged course of IV antibiotic  -neurosurgery follow-up ongoing     3  Acute low back pain  Likely related to #1  MRI does show mild bulge at L4  Also moderate narrowing at L4-5 and L5-S1  This may be the cause of back pain  However, there was also enhancement noted at L5-S1 and cannot rule out diskitis     -plan as above  -pain management per primary team  -monitor back pain in neuro exam     4  Abnormal LFTs  As per patient, this is not a new finding  May be related to his history of extensive alcohol abuse  Recommend outpatient workup and monitoring      5  Thrombocytopenia  Platelets on low side but remains stable  This may also be related to extensive alcohol abuse history  Patient is not from this area so has not had any prior imaging done here  No associated fevers, chills to suggest acute infectious process      6   Lumbar disc herniation with spinal stenosis  With history L5 laminectomy about 5 years ago  7   Left plantar foot pain  Patient states he was treated for left foot cellulitis with oral Keflex last month  He still has mild left plantar foot pain  No obvious evidence of cellulitis on exam   This may be the explanation for group B strep bacteremia  -recommend MRI of the foot  -podiatry consultation  -serial foot exam is     Antibiotics:  Cefazolin D 1     Discussed above plan with Nora from [address] [place] [address]  Discussed plan with patient and his family at bedside  Subjective:  Stable low back pain  Still complains of mild left plantar foot pain  No skin lesions  Denies fevers, chills, or sweats  Denies nausea, vomiting, or diarrhea  Objective:  Vitals:  HR:  [74-93] 91  Resp:  [16-18] 16  BP: ()/(44-58) 89/50  SpO2:  [94 %-98 %] 95 %  Temp (24hrs), Av 5 °F (36 9 °C), Min:97 7 °F (36 5 °C), Max:99 2 °F (37 3 °C)  Current: Temperature: 98 9 °F (37 2 °C)    Physical Exam:   General:  Well-nourished, well-developed, in no acute distress  Eyes:  Conjunctive clear with no hemorrhages or effusions  Oropharynx:  No ulcers, no lesions  Neck:  Supple, no lymphadenopathy  Lungs:  Clear to auscultation bilaterally, no accessory muscle use  Cardiac:  Regular rate and rhythm, no murmurs  Abdomen:  Soft, non-tender, non-distented  Extremities:  No peripheral cyanosis, clubbing, or edema  Skin:  No rashes, no ulcers  Stable lumbar spinal tenderness  Neurological:  Moves all four extremities spontaneously, sensation grossly intact    Lab Results:  I have personally reviewed pertinent labs      Results from last 7 days  Lab Units 18  0542 18  1358 18  0523 18  0458 18  0007   SODIUM mmol/L 134* 131* 134* 135* 133*   POTASSIUM mmol/L 3 4* 3 6 3 9 3 3* 3 3*   CHLORIDE mmol/L 99* 98* 102 98* 98*   CO2 mmol/L 26 25 24 25 21   BUN mg/dL 12 8 11 12 12   CREATININE mg/dL 1 53* 1 02 0 97 1 27 1 09   EGFR ml/min/1 73sq m 49 80 85 61 74 CALCIUM mg/dL 8 7 9 1 8 5 8 0* 8 4   AST U/L  --   --   --  93* 116*   ALT U/L  --   --   --  90* 103*   ALK PHOS U/L  --   --   --  136* 143*       Results from last 7 days  Lab Units 08/28/18  1358 08/27/18  0523 08/26/18  0458   WBC Thousand/uL 5 96 6 25 7 25   HEMOGLOBIN g/dL 12 3 12 7 13 2   PLATELETS Thousands/uL 52* 55* 61*       Results from last 7 days  Lab Units 08/28/18  1501 08/28/18  1358   BLOOD CULTURE  Beta Hemolytic Streptococcus Group B* Beta Hemolytic Streptococcus Group B*   GRAM STAIN RESULT  Gram positive cocci in chains Gram positive cocci in chains       Imaging Studies:   I have personally reviewed pertinent imaging study reports and images in PACS  Foot x-ray shows erosion involving the medial aspect of the distal 1st proximal phalanx  EKG, Pathology, and Other Studies:   I have personally reviewed pertinent reports  Transthoracic echocardiogram is negative

## 2018-08-29 NOTE — OCCUPATIONAL THERAPY NOTE
Occupational Therapy Cancellation Note        Patient Name: Pilar HUTCHINSON Date: 8/29/2018    OT orders received and chart review completed  Spoke w/ Idania Cardona who reports pt not appropriate for OT eval today due to hypotensive  Will await clarification from neurosurgery and continue to follow to complete eval as appropriate and schedule allows      Leeann Love, OT

## 2018-08-29 NOTE — CASE MANAGEMENT
Continued Stay Review    Date: 8/29/2018    Vital Signs: BP (!) 82/50 (BP Location: Right arm)   Pulse 89   Temp 97 7 °F (36 5 °C) (Oral)   Resp 16   Wt 75 5 kg (166 lb 7 2 oz)   SpO2 96%     Medications:   Scheduled Meds:   Current Facility-Administered Medications:  cefazolin 2,000 mg Intravenous Q8H Last Rate: 2,000 mg (08/29/18 1336)   docusate sodium 100 mg Oral BID    levothyroxine 50 mcg Oral Early Morning    lidocaine 1 patch Transdermal Daily    melatonin 3 mg Oral HS    menthol-methyl salicylate  Apply externally 4x Daily PRN    methocarbamol 750 mg Oral Q6H [address]    morphine injection 2 mg Intravenous Q4H PRN    ondansetron 4 mg Intravenous Q6H PRN    oxyCODONE 5 mg Oral Q4H PRN    pantoprazole 40 mg Oral Early Morning    senna 1 tablet Oral HS    sodium chloride 125 mL/hr Intravenous Continuous Last Rate: 125 mL/hr (08/29/18 1206)   thiamine 100 mg Oral Daily    traMADol 50 mg Oral Q6H PRN      Continuous Infusions:   sodium chloride 125 mL/hr Last Rate: 125 mL/hr (08/29/18 1206)     PRN Meds: not used  Abnormal Labs/Diagnostic Results:   Xray left foot - Erosion involving the medial aspect of the distal 1st proximal phalanx potentially reflecting sequela of gout  Na 134  K 3 4  Bun 12  Creatinine 1 53        Blood culture [[id number]] (Abnormal) Collected: 08/28/18 1501   Lab Status: Preliminary result Specimen: Blood from Hand, Left Updated: 08/29/18 1258    Blood Culture Beta Hemolytic Streptococcus Group B (A)    Comment: see related culture for Identification and/or Susceptibilitiy       Gram Stain Result Gram positive cocci in chains   Blood culture [[id number]] (Abnormal) Collected: 08/28/18 1358   Lab Status: Preliminary result Specimen: Blood from Arm, Left Updated: 08/29/18 1253    Blood Culture Beta Hemolytic Streptococcus Group B (A)    Gram Stain Result Gram positive cocci in chains       Age/Sex: 60 y o  male     Assessment/Plan:60year old male, admitted with back pain, now has likely discitis at L5-S1 due to prior foot infection last month, blood cultures are positive  Treatment includes IV antibiotics, await final culture results, both neurosurgery and ID are following  Discharge Plan: to be determined  Thank you,  Xenia Rubio  Utilization Review Department  Phone: [phone]; Fax [phone]  ATTENTION: Please call with any questions or concerns to [phone]  and carefully follow the prompts so that you are directed to the right person  Send all requests for admission clinical reviews, approved or denied determinations and any other requests to fax [phone]   All voicemails are confidential

## 2018-08-29 NOTE — ASSESSMENT & PLAN NOTE
· Suspect this was due to NSAIDs and hypotension/sepsis  Will provide aggressive IV fluids and recheck in a m

## 2018-08-29 NOTE — ASSESSMENT & PLAN NOTE
· Last drink prior to coming to [place]    Not demonstrating any current withdrawal symptoms  · In 12 step program but has not remained consistently sober  · Add thiamine and folic acid and check V14

## 2018-08-29 NOTE — ASSESSMENT & PLAN NOTE
· Platelet count 23I  Likely due to alcohol use  Not receiving any blood thinners    Follow counts, as these can also drop with sepsis

## 2018-08-29 NOTE — PROGRESS NOTES
Notified by RN that pt's preliminary BC's resulted positive x2 for GPC in chains  Pt remains afebrile without leukocytosis and meeting no SIRS criteria  Will hold off on initiating abx until final results return  ID following, will appreciate further input

## 2018-08-30 PROBLEM — N17.9 AKI (ACUTE KIDNEY INJURY) (HCC): Status: RESOLVED | Noted: 2018-08-29 | Resolved: 2018-08-30

## 2018-08-30 PROBLEM — M79.672 FOOT PAIN, LEFT: Status: ACTIVE | Noted: 2018-08-30

## 2018-08-30 PROBLEM — E87.1 HYPONATREMIA: Status: RESOLVED | Noted: 2018-08-27 | Resolved: 2018-08-30

## 2018-08-30 LAB
ALBUMIN SERPL BCP-MCNC: 1.7 G/DL (ref 3.5–5)
ALP SERPL-CCNC: 160 U/L (ref 46–116)
ALT SERPL W P-5'-P-CCNC: 40 U/L (ref 12–78)
ANION GAP SERPL CALCULATED.3IONS-SCNC: 10 MMOL/L (ref 4–13)
AST SERPL W P-5'-P-CCNC: 60 U/L (ref 5–45)
BACTERIA BLD CULT: ABNORMAL
BASOPHILS # BLD MANUAL: 0 THOUSAND/UL (ref 0–0.1)
BASOPHILS NFR MAR MANUAL: 0 % (ref 0–1)
BILIRUB DIRECT SERPL-MCNC: 1.59 MG/DL (ref 0–0.2)
BILIRUB SERPL-MCNC: 1.8 MG/DL (ref 0.2–1)
BUN SERPL-MCNC: 10 MG/DL (ref 5–25)
CALCIUM SERPL-MCNC: 8.3 MG/DL (ref 8.3–10.1)
CHLORIDE SERPL-SCNC: 104 MMOL/L (ref 100–108)
CO2 SERPL-SCNC: 22 MMOL/L (ref 21–32)
CREAT SERPL-MCNC: 0.92 MG/DL (ref 0.6–1.3)
DOHLE BOD BLD QL SMEAR: PRESENT
EOSINOPHIL # BLD MANUAL: 0.07 THOUSAND/UL (ref 0–0.4)
EOSINOPHIL NFR BLD MANUAL: 1 % (ref 0–6)
ERYTHROCYTE [DISTWIDTH] IN BLOOD BY AUTOMATED COUNT: 13.9 % (ref 11.6–15.1)
GFR SERPL CREATININE-BSD FRML MDRD: 91 ML/MIN/1.73SQ M
GLUCOSE SERPL-MCNC: 148 MG/DL (ref 65–140)
GRAM STN SPEC: ABNORMAL
HCT VFR BLD AUTO: 31.8 % (ref 36.5–49.3)
HGB BLD-MCNC: 10.9 G/DL (ref 12–17)
LYMPHOCYTES # BLD AUTO: 0.96 THOUSAND/UL (ref 0.6–4.47)
LYMPHOCYTES # BLD AUTO: 13 % (ref 14–44)
MCH RBC QN AUTO: 32.8 PG (ref 26.8–34.3)
MCHC RBC AUTO-ENTMCNC: 34.3 G/DL (ref 31.4–37.4)
MCV RBC AUTO: 96 FL (ref 82–98)
MONOCYTES # BLD AUTO: 0.96 THOUSAND/UL (ref 0–1.22)
MONOCYTES NFR BLD: 13 % (ref 4–12)
NEUTROPHILS # BLD MANUAL: 5.31 THOUSAND/UL (ref 1.85–7.62)
NEUTS BAND NFR BLD MANUAL: 19 % (ref 0–8)
NEUTS SEG NFR BLD AUTO: 53 % (ref 43–75)
NRBC BLD AUTO-RTO: 0 /100 WBCS
PLATELET # BLD AUTO: 46 THOUSANDS/UL (ref 149–390)
PLATELET BLD QL SMEAR: ABNORMAL
PMV BLD AUTO: 10.4 FL (ref 8.9–12.7)
POTASSIUM SERPL-SCNC: 3.5 MMOL/L (ref 3.5–5.3)
PROT SERPL-MCNC: 6 G/DL (ref 6.4–8.2)
RBC # BLD AUTO: 3.32 MILLION/UL (ref 3.88–5.62)
SODIUM SERPL-SCNC: 136 MMOL/L (ref 136–145)
TOTAL CELLS COUNTED SPEC: 100
TOXIC GRANULES BLD QL SMEAR: PRESENT
VARIANT LYMPHS # BLD AUTO: 1 %
VIT B12 SERPL-MCNC: 2947 PG/ML (ref 100–900)
WBC # BLD AUTO: 7.38 THOUSAND/UL (ref 4.31–10.16)

## 2018-08-30 PROCEDURE — 97116 GAIT TRAINING THERAPY: CPT

## 2018-08-30 PROCEDURE — 82607 VITAMIN B-12: CPT | Performed by: PHYSICIAN ASSISTANT

## 2018-08-30 PROCEDURE — 85027 COMPLETE CBC AUTOMATED: CPT | Performed by: PHYSICIAN ASSISTANT

## 2018-08-30 PROCEDURE — 99233 SBSQ HOSP IP/OBS HIGH 50: CPT | Performed by: INTERNAL MEDICINE

## 2018-08-30 PROCEDURE — 97760 ORTHOTIC MGMT&TRAING 1ST ENC: CPT

## 2018-08-30 PROCEDURE — 99232 SBSQ HOSP IP/OBS MODERATE 35: CPT | Performed by: PHYSICIAN ASSISTANT

## 2018-08-30 PROCEDURE — 85007 BL SMEAR W/DIFF WBC COUNT: CPT | Performed by: PHYSICIAN ASSISTANT

## 2018-08-30 PROCEDURE — 80048 BASIC METABOLIC PNL TOTAL CA: CPT | Performed by: PHYSICIAN ASSISTANT

## 2018-08-30 PROCEDURE — 87040 BLOOD CULTURE FOR BACTERIA: CPT | Performed by: INTERNAL MEDICINE

## 2018-08-30 PROCEDURE — 80076 HEPATIC FUNCTION PANEL: CPT | Performed by: PHYSICIAN ASSISTANT

## 2018-08-30 RX ORDER — FOLIC ACID 1 MG/1
1 TABLET ORAL DAILY
Status: DISCONTINUED | OUTPATIENT
Start: 2018-08-30 | End: 2018-09-06 | Stop reason: HOSPADM

## 2018-08-30 RX ORDER — LANOLIN ALCOHOL/MO/W.PET/CERES
6 CREAM (GRAM) TOPICAL
Status: DISCONTINUED | OUTPATIENT
Start: 2018-08-30 | End: 2018-09-06 | Stop reason: HOSPADM

## 2018-08-30 RX ORDER — MUSCLE RUB CREAM 100; 150 MG/G; MG/G
CREAM TOPICAL 3 TIMES DAILY
Status: DISCONTINUED | OUTPATIENT
Start: 2018-08-30 | End: 2018-09-06 | Stop reason: HOSPADM

## 2018-08-30 RX ORDER — LIDOCAINE 50 MG/G
2 PATCH TOPICAL DAILY
Status: DISCONTINUED | OUTPATIENT
Start: 2018-08-30 | End: 2018-09-06 | Stop reason: HOSPADM

## 2018-08-30 RX ADMIN — METHOCARBAMOL 750 MG: 750 TABLET, FILM COATED ORAL at 05:05

## 2018-08-30 RX ADMIN — OXYCODONE HYDROCHLORIDE 5 MG: 5 TABLET ORAL at 12:49

## 2018-08-30 RX ADMIN — CEFAZOLIN SODIUM 2000 MG: 2 SOLUTION INTRAVENOUS at 12:44

## 2018-08-30 RX ADMIN — LIDOCAINE 2 PATCH: 50 PATCH CUTANEOUS at 09:48

## 2018-08-30 RX ADMIN — MENTHOL, METHYL SALICYLATE: 10; 15 CREAM TOPICAL at 17:29

## 2018-08-30 RX ADMIN — MELATONIN TAB 3 MG 6 MG: 3 TAB at 21:43

## 2018-08-30 RX ADMIN — METHOCARBAMOL 750 MG: 750 TABLET, FILM COATED ORAL at 17:28

## 2018-08-30 RX ADMIN — DOCUSATE SODIUM 100 MG: 100 CAPSULE, LIQUID FILLED ORAL at 17:28

## 2018-08-30 RX ADMIN — MENTHOL, METHYL SALICYLATE 1 APPLICATION: 10; 15 CREAM TOPICAL at 21:43

## 2018-08-30 RX ADMIN — PANTOPRAZOLE SODIUM 40 MG: 40 TABLET, DELAYED RELEASE ORAL at 05:05

## 2018-08-30 RX ADMIN — DOCUSATE SODIUM 100 MG: 100 CAPSULE, LIQUID FILLED ORAL at 09:47

## 2018-08-30 RX ADMIN — CEFAZOLIN SODIUM 2000 MG: 2 SOLUTION INTRAVENOUS at 05:05

## 2018-08-30 RX ADMIN — METHOCARBAMOL 750 MG: 750 TABLET, FILM COATED ORAL at 12:44

## 2018-08-30 RX ADMIN — OXYCODONE HYDROCHLORIDE 5 MG: 5 TABLET ORAL at 02:01

## 2018-08-30 RX ADMIN — OXYCODONE HYDROCHLORIDE 5 MG: 5 TABLET ORAL at 07:36

## 2018-08-30 RX ADMIN — MORPHINE SULFATE 2 MG: 2 INJECTION, SOLUTION INTRAMUSCULAR; INTRAVENOUS at 21:43

## 2018-08-30 RX ADMIN — FOLIC ACID 1 MG: 1 TABLET ORAL at 09:48

## 2018-08-30 RX ADMIN — LEVOTHYROXINE SODIUM 50 MCG: 50 TABLET ORAL at 05:05

## 2018-08-30 RX ADMIN — CEFAZOLIN SODIUM 2000 MG: 2 SOLUTION INTRAVENOUS at 21:44

## 2018-08-30 RX ADMIN — METHOCARBAMOL 750 MG: 750 TABLET, FILM COATED ORAL at 23:54

## 2018-08-30 RX ADMIN — OXYCODONE HYDROCHLORIDE 5 MG: 5 TABLET ORAL at 20:24

## 2018-08-30 RX ADMIN — SENNOSIDES 8.6 MG: 8.6 TABLET, FILM COATED ORAL at 21:43

## 2018-08-30 RX ADMIN — Medication 100 MG: at 09:47

## 2018-08-30 RX ADMIN — SODIUM CHLORIDE 75 ML/HR: 0.9 INJECTION, SOLUTION INTRAVENOUS at 15:14

## 2018-08-30 RX ADMIN — MORPHINE SULFATE 2 MG: 2 INJECTION, SOLUTION INTRAMUSCULAR; INTRAVENOUS at 14:01

## 2018-08-30 RX ADMIN — MENTHOL, METHYL SALICYLATE: 10; 15 CREAM TOPICAL at 09:49

## 2018-08-30 NOTE — PLAN OF CARE
Problem: PHYSICAL THERAPY ADULT  Goal: Performs mobility at highest level of function for planned discharge setting  See evaluation for individualized goals  Treatment/Interventions: LE strengthening/ROM, Therapeutic exercise, Cognitive reorientation, Equipment eval/education, Bed mobility, Patient/family training  Equipment Recommended: Other (Comment) (pt needs dependent means for out of bed mobilization )       See flowsheet documentation for full assessment, interventions and recommendations  Outcome: Progressing  Prognosis: Fair  Problem List: Decreased strength, Decreased range of motion, Decreased endurance, Impaired balance, Decreased mobility, Impaired sensation, Pain, Orthopedic restrictions  Assessment: pt demonstrating progress c PT, however cont to report 9/10 back pain  completed bed mobility min/mod (A)x1 c min verbal cues for log roll technique  fitting for LSO in sitting at EOB  pt dependent for donning/doffing  educated on proper donning/doffing technique + adjustment for comfort  cont to require increased time to complete all transitions  initiated ambulation at bedside; able to take 3 steps laterally c support of RW mod (A)x1 c verbal/tactile cues for sequencing + RW management  able to tolerate sitting on BSC x5min during session  initiated supine AROM ther ex c hip ABD + ankle pumps x10 reps  further ther ex deferred 2* increased pain  will cont skilled PT to further maximize functional mobility + improve quality of life  upon d/c, recommend STR  Barriers to Discharge: Inaccessible home environment     Recommendation: Short-term skilled PT     PT - OK to Discharge: Yes (to STR when stable )    See flowsheet documentation for full assessment

## 2018-08-30 NOTE — ASSESSMENT & PLAN NOTE
· Platelet count 34P  Likely due to alcohol use  Not receiving any blood thinners    Follow counts, as these can also drop with sepsis

## 2018-08-30 NOTE — ASSESSMENT & PLAN NOTE
· Sepsis criteria of new hypotension and YONATAN on 8/29/18-- now resolved after several fluid boluses and albumin; lactic acid normal; procalcitonin elevated at 2 18  · ID following  · Unclear source of bacteremia? ?recent left foot infection  Xray and MRI of foot showed erosion with no osteomyelitis  Podiatry eval requested  · Blood cultures 2/2 positive for b hemolytic strep; repeat samples to be obtained today  · 2d echo unremarkable  · IV ancef day #2; Anticipate patient will require a prolonged course of IV antibiotics  Case management is following  This will need to be discussed certainly in light of the fact that patient is from [place] and not living locally      · Given low albumin, store adding nutritional supplements

## 2018-08-30 NOTE — CONSULTS
Consult - Podiatry   Deyanira Baez 60 y o  male MRN: [id number]  Unit/Bed#: -01 Encounter: [id number]    Assessment/Plan     Assessment:  1  Group B strep bacteremia  2  Suspected left foot acute gout with pain    Plan:  1  Overall often the patient's lower extremity exam relatively benign  There is some mild erythema over the talonavicular joint but the mild redness is easily blanchable and peers more inflammatory than cellulitic  Patient has no pain along the 1st ray where there are some gouty changes noted on x-ray and MRI I reviewed the MRI personally did not see any evidence of an abscess or bone infection anywhere throughout the patient's foot  Although it is possible would ever infection he had 6 weeks ago could have seeded a blood infection at no time was report ever having an open wound to his foot  Directly a streptococcal cellulitis could have seeded his blood at that time  2    Regarding the patient's lower extremity, he will need to be on long-term antibiotics for the diskitis in his back  This will surely resolve any infection in his foot even if there is some minor cellulitis  We will check again in him 48 hr to ensure that the pain continues to improve  Consider steroid injection into the talonavicular joint if pain continues  3    He may weight bear as tolerated    History of Present Illness     HPI:  Deyanira Baez is a 60 y o  male who presents with severe low back pain and MRI confirmed diskitis  Patient states yesterday his left foot began to hurt a lot  Approximately 6 weeks ago patient states he had an infection in his foot that required him to take Keflex from his primary care physician  He has a somewhat unreliable history and continues to switch between telling me he gets plantar warts and plantar fasciitis but cannot seem to come with the consensus as to what his issues are    At some 0 6 weeks ago he was treating his foot for a plantar wart but states that he never had a wart to begin with  Regardless of this historical incongruity, it did appear after beginning oral antibiotics that the redness in his foot did go away  He denies any history of gout  His foot actually felt fine until yesterday when it seemed a little red and swollen on the medial aspect of the arch  I was consulted for further evaluation  X-rays and MRI were ordered  Patient states the pain is better today than it was yesterday  Other than back pain he states he feels well  Consults  Review of Systems   Constitutional: Negative  HENT: Negative  Eyes: Negative  Respiratory: Negative  Cardiovascular: Negative  Gastrointestinal: Negative  Musculoskeletal:   Back pain, left foot pain   Skin:  Negative   Neurological: Negative  Psych: negative      Historical Information   Past Medical History:   Diagnosis Date    Hyperlipidemia     Hypertension     Hypothyroidism     Lower back pain      History reviewed  No pertinent surgical history  Social History   History   Alcohol Use    9 6 oz/week    16 Cans of beer per week     History   Drug Use No     History   Smoking Status    Never Smoker   Smokeless Tobacco    Never Used     Family History: History reviewed  No pertinent family history      Meds/Allergies   Prescriptions Prior to Admission   Medication    amLODIPine (NORVASC) 5 mg tablet    thyroid desiccated (ARMOUR THYROID) 30 mg tablet     No Known Allergies    Objective   First Vitals:   Blood Pressure: 138/74 (08/25/18 2125)  Pulse: 80 (08/25/18 2125)  Temperature: 98 °F (36 7 °C) (08/25/18 2125)  Temp Source: Oral (08/25/18 2125)  Respirations: 18 (08/25/18 2125)  Weight - Scale: 75 5 kg (166 lb 7 2 oz) (08/25/18 2125)  SpO2: 100 % (08/25/18 2125)    Current Vitals:   Blood Pressure: 115/69 (08/30/18 1525)  Pulse: 79 (08/30/18 1525)  Temperature: 98 1 °F (36 7 °C) (08/30/18 1525)  Temp Source: Oral (08/30/18 1525)  Respirations: 18 (08/30/18 1525)  Weight - Scale: 75 5 kg (166 lb 7 2 oz) (08/25/18 2125)  SpO2: 97 % (08/30/18 1525)        /69 (BP Location: Right arm)   Pulse 79   Temp 98 1 °F (36 7 °C) (Oral)   Resp 18   Wt 75 5 kg (166 lb 7 2 oz)   SpO2 97%   Physical Exam:  General:    Alert, cooperative, no distress   Head:    Normocephalic, without obvious abnormality, atraumatic   Eyes:    PERRL, conjunctiva/corneas clear, EOM's intact            Nose:   Moist mucous membranes, no drainage or sinus tenderness   Throat:   No tenderness, no exudates   Neck:   Supple, symmetrical, trachea midline, no JVD   Back:     Tenderness to lower back with palpation  No open lesion  Lungs:     Clear to auscultation bilaterally, respirations unlabored   Chest wall:    No tenderness or deformity   Heart:    Regular rate and rhythm, S1 and S2 normal   Abdomen:     Soft, non-tender, bowel sounds active all four quadrants           Extremities:   Palpable pedal pulses  Capillary refill time is brisk  There is mild erythema over the navicular tuberosity on the left foot with some tenderness  The tibialis anterior tendon is palpated in function well but its insertion is tender to touch  Pain is minimal at best   I find no swelling erythema edema pain or open wound around the 1st ray of the left foot  There are no open lesions to either foot  Manual muscle testing is normal   Gross sensation is intact  Neurologic:   CNII-XII intact   Normal strength, sensation and reflexes       throughout     Lab Results:   Admission on 08/25/2018   Component Date Value    WBC 08/26/2018 8 90     RBC 08/26/2018 3 92     Hemoglobin 08/26/2018 13 1     Hematocrit 08/26/2018 37 4     MCV 08/26/2018 95     MCH 08/26/2018 33 4     MCHC 08/26/2018 35 0     RDW 08/26/2018 13 3     MPV 08/26/2018 10 0     Platelets 33/69/5845 72*    nRBC 08/26/2018 0     Neutrophils Relative 08/26/2018 85*    Immat GRANS % 08/26/2018 0     Lymphocytes Relative 08/26/2018 6*    Monocytes Relative 08/26/2018 9     Eosinophils Relative 08/26/2018 0     Basophils Relative 08/26/2018 0     Neutrophils Absolute 08/26/2018 7 59     Immature Grans Absolute 08/26/2018 0 04     Lymphocytes Absolute 08/26/2018 0 49*    Monocytes Absolute 08/26/2018 0 76     Eosinophils Absolute 08/26/2018 0 00     Basophils Absolute 08/26/2018 0 02     Sodium 08/26/2018 133*    Potassium 08/26/2018 3 3*    Chloride 08/26/2018 98*    CO2 08/26/2018 21     ANION GAP 08/26/2018 14*    BUN 08/26/2018 12     Creatinine 08/26/2018 1 09     Glucose 08/26/2018 126     Calcium 08/26/2018 8 4     AST 08/26/2018 116*    ALT 08/26/2018 103*    Alkaline Phosphatase 08/26/2018 143*    Total Protein 08/26/2018 7 6     Albumin 08/26/2018 3 4*    Total Bilirubin 08/26/2018 1 70*    eGFR 08/26/2018 74     WBC 08/26/2018 7 25     RBC 08/26/2018 3 98     Hemoglobin 08/26/2018 13 2     Hematocrit 08/26/2018 38 4     MCV 08/26/2018 97     MCH 08/26/2018 33 2     MCHC 08/26/2018 34 4     RDW 08/26/2018 13 5     Platelets 16/73/7387 61*    MPV 08/26/2018 9 6     Sodium 08/26/2018 135*    Potassium 08/26/2018 3 3*    Chloride 08/26/2018 98*    CO2 08/26/2018 25     ANION GAP 08/26/2018 12     BUN 08/26/2018 12     Creatinine 08/26/2018 1 27     Glucose 08/26/2018 127     Calcium 08/26/2018 8 0*    AST 08/26/2018 93*    ALT 08/26/2018 90*    Alkaline Phosphatase 08/26/2018 136*    Total Protein 08/26/2018 7 2     Albumin 08/26/2018 2 9*    Total Bilirubin 08/26/2018 1 50*    eGFR 08/26/2018 61     WBC 08/27/2018 6 25     RBC 08/27/2018 3 87*    Hemoglobin 08/27/2018 12 7     Hematocrit 08/27/2018 37 9     MCV 08/27/2018 98     MCH 08/27/2018 32 8     MCHC 08/27/2018 33 5     RDW 08/27/2018 13 4     Platelets 05/56/9391 55*    MPV 08/27/2018 10 2     Sodium 08/27/2018 134*    Potassium 08/27/2018 3 9     Chloride 08/27/2018 102     CO2 08/27/2018 24     ANION GAP 08/27/2018 8     BUN 08/27/2018 11     Creatinine 08/27/2018 0 97     Glucose 08/27/2018 131     Calcium 08/27/2018 8 5     eGFR 08/27/2018 85     Magnesium 08/27/2018 2 1     CRP 08/27/2018 >90 0*    Sed Rate 08/28/2018 64*    Blood Culture 08/28/2018 Beta Hemolytic Streptococcus Group B*    Gram Stain Result 08/28/2018 Gram positive cocci in chains     WBC 08/28/2018 5 96     RBC 08/28/2018 3 69*    Hemoglobin 08/28/2018 12 3     Hematocrit 08/28/2018 35 3*    MCV 08/28/2018 96     MCH 08/28/2018 33 3     MCHC 08/28/2018 34 8     RDW 08/28/2018 13 2     MPV 08/28/2018 9 8     Platelets 92/24/8346 52*    nRBC 08/28/2018 0     Neutrophils Relative 08/28/2018 75     Immat GRANS % 08/28/2018 1     Lymphocytes Relative 08/28/2018 7*    Monocytes Relative 08/28/2018 15*    Eosinophils Relative 08/28/2018 1     Basophils Relative 08/28/2018 1     Neutrophils Absolute 08/28/2018 4 53     Immature Grans Absolute 08/28/2018 0 07     Lymphocytes Absolute 08/28/2018 0 41*    Monocytes Absolute 08/28/2018 0 88     Eosinophils Absolute 08/28/2018 0 03     Basophils Absolute 08/28/2018 0 04     Sodium 08/28/2018 131*    Potassium 08/28/2018 3 6     Chloride 08/28/2018 98*    CO2 08/28/2018 25     ANION GAP 08/28/2018 8     BUN 08/28/2018 8     Creatinine 08/28/2018 1 02     Glucose 08/28/2018 156*    Calcium 08/28/2018 9 1     eGFR 08/28/2018 80     Blood Culture 08/28/2018 Beta Hemolytic Streptococcus Group B*    Gram Stain Result 08/28/2018 Gram positive cocci in chains     Sodium 08/29/2018 134*    Potassium 08/29/2018 3 4*    Chloride 08/29/2018 99*    CO2 08/29/2018 26     ANION GAP 08/29/2018 9     BUN 08/29/2018 12     Creatinine 08/29/2018 1 53*    Glucose 08/29/2018 109     Calcium 08/29/2018 8 7     eGFR 08/29/2018 49     LACTIC ACID 08/29/2018 1 8     Procalcitonin 08/29/2018 2 18*    Folate 08/29/2018 13 9     WBC 08/30/2018 7 38     RBC 08/30/2018 3 32*    Hemoglobin 08/30/2018 10 9*    Hematocrit 08/30/2018 31 8*    MCV 08/30/2018 96     MCH 08/30/2018 32 8     MCHC 08/30/2018 34 3     RDW 08/30/2018 13 9     MPV 08/30/2018 10 4     Platelets 34/60/4104 46*    nRBC 08/30/2018 0     Sodium 08/30/2018 136     Potassium 08/30/2018 3 5     Chloride 08/30/2018 104     CO2 08/30/2018 22     ANION GAP 08/30/2018 10     BUN 08/30/2018 10     Creatinine 08/30/2018 0 92     Glucose 08/30/2018 148*    Calcium 08/30/2018 8 3     eGFR 08/30/2018 91     Total Bilirubin 08/30/2018 1 80*    Bilirubin, Direct 08/30/2018 1 59*    Alkaline Phosphatase 08/30/2018 160*    AST 08/30/2018 60*    ALT 08/30/2018 40     Total Protein 08/30/2018 6 0*    Albumin 08/30/2018 1 7*    Vitamin B-12 08/30/2018 2947*    Segmented % 08/30/2018 53     Bands % 08/30/2018 19*    Lymphocytes % 08/30/2018 13*    Monocytes % 08/30/2018 13*    Eosinophils % 08/30/2018 1     Basophils % 08/30/2018 0     Atypical Lymphocytes % 08/30/2018 1*    Absolute Neutrophils 08/30/2018 5 31     Lymphocytes Absolute 08/30/2018 0 96     Monocytes Absolute 08/30/2018 0 96     Eosinophils Absolute 08/30/2018 0 07     Basophils Absolute 08/30/2018 0 00     Total Counted 08/30/2018 100     Dohle Bodies 08/30/2018 Present     Toxic Granulation 08/30/2018 Present     Platelet Estimate 74/60/9319 Decreased*     Imaging: I have personally reviewed pertinent films in PACS  There is a Gwyn sign on the medial aspect of the proximal phalanx head suggestive of chronic gouty changes  I agree with MRI report of some minor gouty changes but no acute findings of bone infection or abscess  EKG, Pathology, and Other Studies: I have personally reviewed pertinent reports  Code Status: Level 1 - Full Code  Advance Directive and Living Will:      Power of :    POLST:        Portions of the record may have been created with voice recognition software   Occasional wrong word or "sound a like" substitutions may have occurred due to the inherent limitations of voice recognition software  Read the chart carefully and recognize, using context, where substitutions have occurred

## 2018-08-30 NOTE — ASSESSMENT & PLAN NOTE
· Presented with intractable lower back pain, also with pain into his left foot and new foot drop after doing significant exertion in his yard and going on a prolonged plane ride  · History of lower back pain and surgery in [place] 5 years ago for bulging disc at L5  · Patient still reports poorly controlled pain which is not radiculopathic in nature and now noted to be slightly higher up in his spine  Pain regimen=aqua K, menthol rub, lidocaine patch, prn tramadol, prn oxycodone, prn IV morphine, ATC 750mg Robaxin; Avoiding Tylenol given elevated LFTs; stopped NSAIDS due to transient YONATAN  · Neurosurgery also recommending LSO brace  · CT LS spine showed L5S1 disc herniation  · MRI LS spine= There is a rind of abnormal enhancement surrounding the L5-S1 disc space primarily anteriorly and laterally  Subtle fluid signal within the L5-S1 disc also noted  The possibility of discitis cannot be excluded at this level  ESR and CRP noted to be elevated, consistent with discitis  · Initially planned for IR biopsy but now can be cancelled given positive blood cultures  · Appreciate Infectious Disease follow-up  · Neurosurgery also involved  · PT evaluation noted--they are recommending inpatient acute rehab  Patient is from [place] but is willing to do short term acute rehab   He is not medically ready for discharge

## 2018-08-30 NOTE — PHYSICAL THERAPY NOTE
PT Progress Note (38min)   (13:30-14:08)       08/30/18 1408   Pain Assessment   Pain Assessment 0-10   Pain Score 9   Pain Type Acute pain   Pain Location Back   Pain Orientation Mid   Hospital Pain Intervention(s) Ambulation/increased activity;Repositioned   Restrictions/Precautions   Braces or Orthoses LSO  (when OOB for comfort)   Other Precautions Multiple lines; Fall Risk;Pain;Spinal precautions  (LSO when OOB)   General   Family/Caregiver Present No   Cognition   Orientation Level Oriented X4   Bed Mobility   Rolling R 5  Supervision   Additional items Bedrails;Verbal cues; Increased time required  (cues for log roll technique)   Supine to Sit 3  Moderate assistance   Additional items Assist x 1;Bedrails; Increased time required;Verbal cues   Sit to Supine 4  Minimal assistance   Additional items Assist x 1;Bedrails;Verbal cues; Increased time required  (cues for log roll technique)   Additional Comments LSO fitting in sitting at EOB; pt dependent for donning/doffing   Transfers   Sit to Stand 3  Moderate assistance   Additional items Assist x 1;Verbal cues; Increased time required   Stand to Sit 4  Minimal assistance   Additional items Assist x 1; Armrests; Verbal cues; Increased time required   Stand pivot 3  Moderate assistance   Additional items Assist x 1;Verbal cues; Increased time required  (bed<>BSC)   Toilet transfer 3  Moderate assistance   Additional items Assist x 1; Armrests; Verbal cues; Commode; Increased time required   Ambulation/Elevation   Gait pattern Antalgic;Decreased foot clearance;L Foot drag;Excessively slow; Step to; Improper Weight shift   Gait Assistance 3  Moderate assist   Additional items Assist x 1;Verbal cues; Tactile cues   Assistive Device Rolling walker   Distance 3' laterally at EOB; significantly limited by pain   Balance   Static Sitting Fair   Dynamic Sitting Fair   Static Standing Poor +   Dynamic Standing Poor   Ambulatory Poor   Activity Tolerance   Activity Tolerance Patient limited by fatigue;Patient limited by pain   Nurse [address]   Assessment   Prognosis Fair   Problem List Decreased strength;Decreased range of motion;Decreased endurance; Impaired balance;Decreased mobility; Impaired sensation;Pain;Orthopedic restrictions   Assessment pt demonstrating progress c PT, however cont to report 9/10 back pain  completed bed mobility min/mod (A)x1 c min verbal cues for log roll technique  fitting for LSO in sitting at EOB  pt dependent for donning/doffing  educated on proper donning/doffing technique + adjustment for comfort  cont to require increased time to complete all transitions  initiated ambulation at bedside; able to take 3 steps laterally c support of RW mod (A)x1 c verbal/tactile cues for sequencing + RW management  able to tolerate sitting on BSC x5min during session  initiated supine AROM ther ex c hip ABD + ankle pumps x10 reps  further ther ex deferred 2* increased pain  will cont skilled PT to further maximize functional mobility + improve quality of life  upon d/c, recommend STR  Barriers to Discharge Inaccessible home environment   Goals   Patient Goals "I want to walk around s pain and go back to [facility]"  [facility] Expiration Date 09/07/18   Short Term Goal #1 in addition to PT goals: 1  perform transfers c (S) to safely perform ADLs, 2  (I) don/doff LSO in sitting at EOB, 3  ambulate 100' c RW c (S) to safely navigate home environment, 4  negotiate 12 steps c (S) to safely navigate home environment   Treatment Day 2   Plan   Treatment/Interventions Functional transfer training;LE strengthening/ROM; Elevations; Therapeutic exercise; Endurance training;Patient/family training;Equipment eval/education; Bed mobility;Gait training;Spoke to nursing   PT Frequency 5x/wk   Recommendation   Recommendation Short-term skilled PT   PT - OK to Discharge Yes  (to STR when stable )     Sergey Osorio, PT

## 2018-08-30 NOTE — ASSESSMENT & PLAN NOTE
· Last drink prior to coming to [facility]    Not demonstrating any current withdrawal symptoms  · In 12 step program but has not remained consistently sober  · Add thiamine and folic acid; pending results of B12 level  · No evidence of withdrawal

## 2018-08-30 NOTE — PROGRESS NOTES
Progress Note - Infectious Disease   Saleem Liu 60 y o  male MRN: [id number]  Unit/Bed#: -01 Encounter: [id number]         Impression/Recommendations:  1   Group B strep bacteremia  Blood cultures checked yesterday are both positive for group B strep  Likely the explanation for abnormal MRI findings  TTE is negative for endocarditis  Fortunately, patient remains clinically well, nontoxic  Patient is tolerating IV cefazolin without difficulty      -continue high-dose cefazolin 2 g IV q 8 hours  -check repeat blood cultures to ensure clearance of bacteremia  -hold off on PICC line placement until we ensure clearance of bacteremia      2  Abnormal MRI lumbar spine   Patient presented with acute low back pain   X-ray and CT more suggestive of degenerative changes   MRI showing enhancement at L5-S1 disc space, diskitis cannot be excluded   ESR elevated at 64, CRP greater than 90   Patient with no associated symptoms such as fevers or chills   No leukocytosis  Now patient has group B strep in blood which can be the explanation for diskitis      -antibiotic plan as above  -will likely require prolonged course of IV antibiotic, likely 6 weeks    -neurosurgery follow-up ongoing     3   Acute low back pain   Likely related to #1   MRI does show mild bulge at L4   Also moderate narrowing at L4-5 and L5-S1   This may be the cause of back pain   However, there was also enhancement noted at L5-S1 and cannot rule out diskitis        -plan as above  -pain management per primary team  -monitor back pain in neuro exam     4   Abnormal LFTs   As per patient, this is not a new finding   May be related to his history of extensive alcohol abuse   Recommend outpatient workup and monitoring      5   Thrombocytopenia   Platelets on low side but remains stable   This may also be related to extensive alcohol abuse history   Patient is not from this area so has not had any prior imaging done here   No associated fevers, chills to suggest acute infectious process      6   Lumbar disc herniation with spinal stenosis   With history L5 laminectomy about 5 years ago      7   Left plantar foot pain  Patient states he was treated for left foot cellulitis with oral Keflex last month  He still has mild left plantar foot pain  No obvious evidence of cellulitis on exam   This may be the explanation for group B strep bacteremia  MRI of the foot with no clear evidence of osteomyelitis  It does show some erosive changes at the 1st proximal phalanx and base of 4th metatarsal  Which may be related to underlying gout  Also small joint effusion at the 1st MTP joint      -podiatry evaluation  -serial foot exams     Antibiotics:  Cefazolin D2     Discussed above plan with  Podiatry attending  Discussed plan with patient  Subjective:   low back pain persistent but stable  Also complains of mild left plantar foot pain worse with ambulation  No fevers, chills, skin lesions, nausea, vomiting, diarrhea  Objective:  Vitals:  HR:  [76-89] 79  Resp:  [16-20] 18  BP: ()/(54-86) 115/69  SpO2:  [95 %-97 %] 97 %  Temp (24hrs), Av 2 °F (36 8 °C), Min:97 7 °F (36 5 °C), Max:99 1 °F (37 3 °C)  Current: Temperature: 98 1 °F (36 7 °C)    Physical Exam:   General:   No acute distress  Eyes:  Conjunctive clear with no hemorrhages or effusions  Oropharynx:  No ulcers, no lesions  Neck:  Supple, no lymphadenopathy  Lungs:  Clear to auscultation bilaterally, no accessory muscle use  Cardiac:  Regular rate and rhythm, no murmurs  Abdomen:  Soft, non-tender, non-distented  Extremities:  No peripheral cyanosis, clubbing, or edema  Skin:  No rashes, no ulcers  Neurological:  Moves all four extremities spontaneously, sensation grossly intact   stable pinpoint lumbar spinal tenderness    Lab Results:  I have personally reviewed pertinent labs      Results from last 7 days  Lab Units 18  0507 18  0542 18  1358  18  0458 18  0007   SODIUM mmol/L 136 134* 131*  < > 135* 133*   POTASSIUM mmol/L 3 5 3 4* 3 6  < > 3 3* 3 3*   CHLORIDE mmol/L 104 99* 98*  < > 98* 98*   CO2 mmol/L 22 26 25  < > 25 21   BUN mg/dL 10 12 8  < > 12 12   CREATININE mg/dL 0 92 1 53* 1 02  < > 1 27 1 09   EGFR ml/min/1 73sq m 91 49 80  < > 61 74   CALCIUM mg/dL 8 3 8 7 9 1  < > 8 0* 8 4   AST U/L 60*  --   --   --  93* 116*   ALT U/L 40  --   --   --  90* 103*   ALK PHOS U/L 160*  --   --   --  136* 143*   < > = values in this interval not displayed  Results from last 7 days  Lab Units 08/30/18  0507 08/28/18  1358 08/27/18  0523   WBC Thousand/uL 7 38 5 96 6 25   HEMOGLOBIN g/dL 10 9* 12 3 12 7   PLATELETS Thousands/uL 46* 52* 55*       Results from last 7 days  Lab Units 08/28/18  1501 08/28/18  1358   BLOOD CULTURE  Beta Hemolytic Streptococcus Group B* Beta Hemolytic Streptococcus Group B*   GRAM STAIN RESULT  Gram positive cocci in chains Gram positive cocci in chains       Imaging Studies:   I have personally reviewed pertinent imaging study reports and images in PACS  MRI foot shows erosive changes at the distal medial aspect of the 1st proximal phalanx  Small joint effusion at the level of the 1st MTPJ    EKG, Pathology, and Other Studies:   I have personally reviewed pertinent reports

## 2018-08-30 NOTE — PROGRESS NOTES
Progress Note - Elease Saliva 1960, 60 y o  male MRN: [id number]    Unit/Bed#: -01 Encounter: [id number]    Primary Care Provider: No primary care provider on file  Date and time admitted to hospital: 8/25/2018  9:25 PM        * Discitis of lumbosacral region   Assessment & Plan    · Presented with intractable lower back pain, also with pain into his left foot and new foot drop after doing significant exertion in his yard and going on a prolonged plane ride  · History of lower back pain and surgery in [facility] 5 years ago for bulging disc at L5  · Patient still reports poorly controlled pain which is not radiculopathic in nature and now noted to be slightly higher up in his spine  Pain regimen=aqua K, menthol rub, lidocaine patch, prn tramadol, prn oxycodone, prn IV morphine, ATC 750mg Robaxin; Avoiding Tylenol given elevated LFTs; stopped NSAIDS due to transient YONATAN  · Neurosurgery also recommending LSO brace  · CT LS spine showed L5S1 disc herniation  · MRI LS spine= There is a rind of abnormal enhancement surrounding the L5-S1 disc space primarily anteriorly and laterally  Subtle fluid signal within the L5-S1 disc also noted  The possibility of discitis cannot be excluded at this level  ESR and CRP noted to be elevated, consistent with discitis  · Initially planned for IR biopsy but now can be cancelled given positive blood cultures  · Appreciate Infectious Disease follow-up  · Neurosurgery also involved  · PT evaluation noted--they are recommending inpatient acute rehab  Patient is from [place] but is willing to do short term acute rehab  He is not medically ready for discharge        Sepsis [facility])   Assessment & Plan    · Sepsis criteria of new hypotension and YONATAN on 8/29/18-- now resolved after several fluid boluses and albumin; lactic acid normal; procalcitonin elevated at 2 18  · ID following  · Unclear source of bacteremia? ?recent left foot infection   Xray and MRI of foot showed erosion with no osteomyelitis  Podiatry eval requested  · Blood cultures 2/2 positive for b hemolytic strep; repeat samples to be obtained today  · 2d echo unremarkable  · IV ancef day #2; Anticipate patient will require a prolonged course of IV antibiotics  Case management is following  This will need to be discussed certainly in light of the fact that patient is from [place] and not living locally  · Given low albumin, store adding nutritional supplements        YONATAN (acute kidney injury) (HCC)resolved as of 8/30/2018   Assessment & Plan    · Suspect this was due to NSAIDs and hypotension/sepsis  · Creatinine has completely normalized after IV fluids  · Will continue to avoid NSAIDs  · Can decrease IVF  · Would not resume norvasc yet        Foot pain, left   Assessment & Plan    · Will ask for podiatry eval        Hypokalemia   Assessment & Plan    · Repleted--follow        Hyponatremiaresolved as of 8/30/2018   Assessment & Plan    · Improved, follow        Alcohol abuse   Assessment & Plan    · Last drink prior to coming to [place]  Not demonstrating any current withdrawal symptoms  · In 12 step program but has not remained consistently sober  · Add thiamine and folic acid; pending results of B12 level  · No evidence of withdrawal        Elevated LFTs   Assessment & Plan    · Patient reports known h/o mildly elevated LFTs which has been attributed to his alcohol use  Probable underlying cirrhosis  · Denies abdominal pain  · Continue to monitor        Thrombocytopenia (HCC)   Assessment & Plan    · Platelet count 04B  Likely due to alcohol use  Not receiving any blood thinners    Follow counts, as these can also drop with sepsis            VTE Pharmacologic Prophylaxis:   Pharmacologic: Pharmacologic VTE Prophylaxis contraindicated due to Low platelets  Mechanical VTE Prophylaxis in Place: Yes    Patient Centered Rounds: I have performed bedside rounds with nursing staff today     Discussions with Specialists or Other Care Team Provider:  Spoke with case management, Podiatry, Radiology    Education and Discussions with Family / Patient:  Called patient's wife and left a message  9:23 am spoke with wife    Time Spent for Care: 30 minutes  More than 50% of total time spent on counseling and coordination of care as described above  Current Length of Stay: 2 day(s)    Current Patient Status: Inpatient   Certification Statement: The patient will continue to require additional inpatient hospital stay due to Ongoing IV antibiotics    Discharge Plan:  Short-term rehab when medically stable for discharge    Code Status: Level 1 - Full Code    Subjective:   Patient says he is frustrated overall that his back pain is still not under control  He reports improvement when he gets oxycodone but wishes that he did not have to keep taking it  He also felt that when they put [place] on the other day it helped but no one did it yesterday  He feels intermittent ongoing pain in his left foot although it is not as severe at this moment  He is not reporting any dizziness or lightheadedness  He did have a bowel movement successfully with a regimen provided  Reports that his appetite has not been very good and also that he has not been sleeping well  Says that at times he admits he has been a little cognitively fuzzy but feels okay right now    Patient is asking that I print out his chart and fax it to his internist and his friend who is a neurologist in [place]  Instructed him that he must go through the proper channels including signing a release of medical information to have his records sent  Objective:     Vitals:   Temp (24hrs), Av 3 °F (36 8 °C), Min:97 7 °F (36 5 °C), Max:99 1 °F (37 3 °C)    HR:  [76-92] 76  Resp:  [16-20] 18  BP: ()/(50-86) 135/73  SpO2:  [95 %-97 %] 96 %  There is no height or weight on file to calculate BMI       Input and Output Summary (last 24 hours): Intake/Output Summary (Last 24 hours) at 08/30/18 0902  Last data filed at 08/30/18 0731   Gross per 24 hour   Intake             3480 ml   Output             2075 ml   Net             1405 ml       Physical Exam:     Physical Exam   Constitutional: He appears well-developed and well-nourished  No distress  HENT:   Head: Normocephalic and atraumatic  Dry mouth   Eyes: Conjunctivae are normal  Right eye exhibits no discharge  Left eye exhibits no discharge  No scleral icterus  Neck: Neck supple  Cardiovascular: Normal rate, regular rhythm and normal heart sounds  No murmur heard  Pulmonary/Chest: Effort normal and breath sounds normal  No respiratory distress  He has no wheezes  He has no rales  Abdominal: Soft  Bowel sounds are normal  He exhibits no distension  There is no tenderness  Musculoskeletal: He exhibits no edema or deformity  Still with subtle left foot drop noted, less tenderness to palpation  No erythema  No open wound   Neurological: He is alert  Awake alert and interactive  Conversant and able to answer questions appropriately  Follows commands appropriately  No tremor  Skin: Skin is warm and dry  No rash noted  He is not diaphoretic  No erythema  No pallor  Psychiatric:   pleasant and cooperative   Vitals reviewed        Additional Data:     Labs:      Results from last 7 days  Lab Units 08/30/18  0507 08/28/18  1358   WBC Thousand/uL 7 38 5 96   HEMOGLOBIN g/dL 10 9* 12 3   HEMATOCRIT % 31 8* 35 3*   PLATELETS Thousands/uL 46* 52*   NEUTROS PCT %  --  75   LYMPHS PCT %  --  7*   LYMPHO PCT % 13*  --    MONOS PCT %  --  15*   MONO PCT MAN % 13*  --    EOS PCT %  --  1   EOSINO PCT MANUAL % 1  --        Results from last 7 days  Lab Units 08/30/18  0507   SODIUM mmol/L 136   POTASSIUM mmol/L 3 5   CHLORIDE mmol/L 104   CO2 mmol/L 22   BUN mg/dL 10   CREATININE mg/dL 0 92   CALCIUM mg/dL 8 3   ALK PHOS U/L 160*   ALT U/L 40   AST U/L 60*                 * I Have Reviewed All Lab Data Listed Above  * Additional Pertinent Lab Tests Reviewed: All Labs Within Last 24 Hours Reviewed    Imaging:    Imaging Reports Reviewed Today Include: MRI foot  Imaging Personally Reviewed by Myself Includes:      Recent Cultures (last 7 days):       Results from last 7 days  Lab Units 08/28/18  1501 08/28/18  1358   BLOOD CULTURE  Beta Hemolytic Streptococcus Group B* Beta Hemolytic Streptococcus Group B*   GRAM STAIN RESULT  Gram positive cocci in chains Gram positive cocci in chains       Last 24 Hours Medication List:     Current Facility-Administered Medications:  cefazolin 2,000 mg Intravenous Q8H Nora Hinojosa PA-C Last Rate: 2,000 mg (08/30/18 0505)   docusate sodium 100 mg Oral BID Nora Hinojosa PA-C    folic acid 1 mg Oral Daily Nora Hinojosa PA-C    levothyroxine 50 mcg Oral Early Morning Idania OwensKIRAN    lidocaine 2 patch Transdermal Daily Nora Hinojosa PA-C    melatonin 6 mg Oral HS Nora Hinojosa PA-C    menthol-methyl salicylate  Apply externally TID Nora Hinojosa PA-C    methocarbamol 750 mg Oral Q6H [facility] Nora Hinojosa PA-C    morphine injection 2 mg Intravenous Q4H PRN Yamilet Marquez MD    ondansetron 4 mg Intravenous Q6H PRN Bubba Jason PA-C    oxyCODONE 5 mg Oral Q4H PRN Yamilet Marquez MD    pantoprazole 40 mg Oral Early Morning Yamilet Marquez MD    senna 1 tablet Oral HS Nora Hinojosa PA-C    sodium chloride 75 mL/hr Intravenous Continuous Nora Hinojosa PA-C Last Rate: 125 mL/hr (08/29/18 1729)   thiamine 100 mg Oral Daily Nora Hinojosa PA-C    traMADol 50 mg Oral Q6H PRN Bubba Jason PA-C         Today, Patient Was Seen By: Cindy Petty PA-C    ** Please Note: Dictation voice to text software may have been used in the creation of this document   **

## 2018-08-31 PROBLEM — A41.9 SEPSIS (HCC): Status: RESOLVED | Noted: 2018-08-29 | Resolved: 2018-08-31

## 2018-08-31 LAB
ALBUMIN SERPL BCP-MCNC: 1.7 G/DL (ref 3.5–5)
ALP SERPL-CCNC: 191 U/L (ref 46–116)
ALT SERPL W P-5'-P-CCNC: 35 U/L (ref 12–78)
ANION GAP SERPL CALCULATED.3IONS-SCNC: 8 MMOL/L (ref 4–13)
AST SERPL W P-5'-P-CCNC: 51 U/L (ref 5–45)
BACTERIA BLD CULT: ABNORMAL
BILIRUB SERPL-MCNC: 0.9 MG/DL (ref 0.2–1)
BUN SERPL-MCNC: 8 MG/DL (ref 5–25)
CALCIUM SERPL-MCNC: 8.6 MG/DL (ref 8.3–10.1)
CHLORIDE SERPL-SCNC: 100 MMOL/L (ref 100–108)
CO2 SERPL-SCNC: 28 MMOL/L (ref 21–32)
CREAT SERPL-MCNC: 0.8 MG/DL (ref 0.6–1.3)
GFR SERPL CREATININE-BSD FRML MDRD: 98 ML/MIN/1.73SQ M
GLUCOSE SERPL-MCNC: 175 MG/DL (ref 65–140)
GRAM STN SPEC: ABNORMAL
POTASSIUM SERPL-SCNC: 3.2 MMOL/L (ref 3.5–5.3)
PROT SERPL-MCNC: 6.1 G/DL (ref 6.4–8.2)
SODIUM SERPL-SCNC: 136 MMOL/L (ref 136–145)

## 2018-08-31 PROCEDURE — 99232 SBSQ HOSP IP/OBS MODERATE 35: CPT | Performed by: INTERNAL MEDICINE

## 2018-08-31 PROCEDURE — 97110 THERAPEUTIC EXERCISES: CPT

## 2018-08-31 PROCEDURE — 97116 GAIT TRAINING THERAPY: CPT

## 2018-08-31 PROCEDURE — G8987 SELF CARE CURRENT STATUS: HCPCS

## 2018-08-31 PROCEDURE — 99233 SBSQ HOSP IP/OBS HIGH 50: CPT | Performed by: PHYSICIAN ASSISTANT

## 2018-08-31 PROCEDURE — 97530 THERAPEUTIC ACTIVITIES: CPT

## 2018-08-31 PROCEDURE — 80053 COMPREHEN METABOLIC PANEL: CPT | Performed by: PHYSICIAN ASSISTANT

## 2018-08-31 PROCEDURE — G8988 SELF CARE GOAL STATUS: HCPCS

## 2018-08-31 PROCEDURE — 97167 OT EVAL HIGH COMPLEX 60 MIN: CPT

## 2018-08-31 RX ORDER — POTASSIUM CHLORIDE 20 MEQ/1
40 TABLET, EXTENDED RELEASE ORAL ONCE
Status: COMPLETED | OUTPATIENT
Start: 2018-08-31 | End: 2018-08-31

## 2018-08-31 RX ORDER — SENNOSIDES 8.6 MG
1 TABLET ORAL 2 TIMES DAILY
Status: DISCONTINUED | OUTPATIENT
Start: 2018-08-31 | End: 2018-09-06 | Stop reason: HOSPADM

## 2018-08-31 RX ORDER — OXYCODONE HYDROCHLORIDE AND ACETAMINOPHEN 5; 325 MG/1; MG/1
2 TABLET ORAL EVERY 4 HOURS PRN
Status: DISCONTINUED | OUTPATIENT
Start: 2018-08-31 | End: 2018-09-05

## 2018-08-31 RX ORDER — POLYETHYLENE GLYCOL 3350 17 G/17G
17 POWDER, FOR SOLUTION ORAL DAILY PRN
Status: DISCONTINUED | OUTPATIENT
Start: 2018-08-31 | End: 2018-09-06 | Stop reason: HOSPADM

## 2018-08-31 RX ADMIN — LIDOCAINE 2 PATCH: 50 PATCH CUTANEOUS at 09:28

## 2018-08-31 RX ADMIN — Medication 100 MG: at 09:27

## 2018-08-31 RX ADMIN — METHOCARBAMOL 750 MG: 750 TABLET, FILM COATED ORAL at 11:17

## 2018-08-31 RX ADMIN — MENTHOL, METHYL SALICYLATE: 10; 15 CREAM TOPICAL at 21:52

## 2018-08-31 RX ADMIN — OXYCODONE HYDROCHLORIDE AND ACETAMINOPHEN 2 TABLET: 5; 325 TABLET ORAL at 21:51

## 2018-08-31 RX ADMIN — POTASSIUM CHLORIDE 40 MEQ: 1500 TABLET, EXTENDED RELEASE ORAL at 13:16

## 2018-08-31 RX ADMIN — MORPHINE SULFATE 2 MG: 2 INJECTION, SOLUTION INTRAMUSCULAR; INTRAVENOUS at 11:23

## 2018-08-31 RX ADMIN — FOLIC ACID 1 MG: 1 TABLET ORAL at 09:28

## 2018-08-31 RX ADMIN — MORPHINE SULFATE 2 MG: 2 INJECTION, SOLUTION INTRAMUSCULAR; INTRAVENOUS at 20:03

## 2018-08-31 RX ADMIN — OXYCODONE HYDROCHLORIDE 5 MG: 5 TABLET ORAL at 05:08

## 2018-08-31 RX ADMIN — OXYCODONE HYDROCHLORIDE 5 MG: 5 TABLET ORAL at 09:27

## 2018-08-31 RX ADMIN — SENNOSIDES 8.6 MG: 8.6 TABLET, FILM COATED ORAL at 13:16

## 2018-08-31 RX ADMIN — OXYCODONE HYDROCHLORIDE AND ACETAMINOPHEN 2 TABLET: 5; 325 TABLET ORAL at 13:16

## 2018-08-31 RX ADMIN — LEVOTHYROXINE SODIUM 50 MCG: 50 TABLET ORAL at 05:08

## 2018-08-31 RX ADMIN — DOCUSATE SODIUM 100 MG: 100 CAPSULE, LIQUID FILLED ORAL at 09:28

## 2018-08-31 RX ADMIN — MELATONIN TAB 3 MG 6 MG: 3 TAB at 21:51

## 2018-08-31 RX ADMIN — DOCUSATE SODIUM 100 MG: 100 CAPSULE, LIQUID FILLED ORAL at 17:06

## 2018-08-31 RX ADMIN — PANTOPRAZOLE SODIUM 40 MG: 40 TABLET, DELAYED RELEASE ORAL at 05:09

## 2018-08-31 RX ADMIN — METHOCARBAMOL 750 MG: 750 TABLET, FILM COATED ORAL at 17:06

## 2018-08-31 RX ADMIN — CEFAZOLIN SODIUM 2000 MG: 2 SOLUTION INTRAVENOUS at 21:52

## 2018-08-31 RX ADMIN — OXYCODONE HYDROCHLORIDE AND ACETAMINOPHEN 2 TABLET: 5; 325 TABLET ORAL at 17:17

## 2018-08-31 RX ADMIN — METHOCARBAMOL 750 MG: 750 TABLET, FILM COATED ORAL at 05:09

## 2018-08-31 RX ADMIN — CEFAZOLIN SODIUM 2000 MG: 2 SOLUTION INTRAVENOUS at 05:09

## 2018-08-31 RX ADMIN — CEFAZOLIN SODIUM 2000 MG: 2 SOLUTION INTRAVENOUS at 13:16

## 2018-08-31 RX ADMIN — METHOCARBAMOL 750 MG: 750 TABLET, FILM COATED ORAL at 23:16

## 2018-08-31 RX ADMIN — MORPHINE SULFATE 2 MG: 2 INJECTION, SOLUTION INTRAMUSCULAR; INTRAVENOUS at 16:06

## 2018-08-31 RX ADMIN — MENTHOL, METHYL SALICYLATE: 10; 15 CREAM TOPICAL at 16:07

## 2018-08-31 RX ADMIN — SENNOSIDES 8.6 MG: 8.6 TABLET, FILM COATED ORAL at 17:06

## 2018-08-31 NOTE — PLAN OF CARE
DISCHARGE PLANNING - CARE MANAGEMENT     Discharge to post-acute care or home with appropriate resources Progressing        MUSCULOSKELETAL - ADULT     Maintain or return mobility to safest level of function Progressing        PAIN - ADULT     Verbalizes/displays adequate comfort level or baseline comfort level Progressing        Potential for Falls     Patient will remain free of falls Progressing        Prexisting or High Potential for Compromised Skin Integrity     Skin integrity is maintained or improved Progressing

## 2018-08-31 NOTE — PROGRESS NOTES
Progress Note - Julian Marte 1960, 60 y o  male MRN: [id number]    Unit/Bed#: -01 Encounter: [id number]    Primary Care Provider: No primary care provider on file  Date and time admitted to hospital: 8/25/2018  9:25 PM    * Discitis of lumbosacral region   Assessment & Plan    · Presented with intractable lower back pain, also with pain into his left foot and new foot drop after doing significant exertion in his yard and going on a prolonged plane ride  · History of lower back pain and surgery in [facility] 5 years ago for bulging disc at L5  · Patient still reports poorly controlled pain which is not radiculopathic in nature and now noted to be slightly higher up in his spine  Pain regimen=aqua K, menthol rub, lidocaine patch, prn tramadol for moderate pain, today I changed 5 mg oxy to 10 mg percocet today--however I did speak with the patient in detail about the fact that I am concerned that he is now requiring more medication for pain  Will be following along with this closely especially because he seems cognitively slow at times  Also continue prn IV morphine, ATC 750mg Robaxin;  stopped NSAIDS due to transient YONATAN  · Neurosurgery also recommending LSO brace  · CT LS spine showed L5S1 disc herniation  · MRI LS spine= There is a rind of abnormal enhancement surrounding the L5-S1 disc space primarily anteriorly and laterally  Subtle fluid signal within the L5-S1 disc also noted  The possibility of discitis cannot be excluded at this level  ESR and CRP noted to be elevated, consistent with discitis  · Initially planned for IR biopsy but this was not needed given positive blood cultures  · Appreciate Infectious Disease ongoing follow-up; Neurosurgery also available as needed  · PT evaluation noted--they are recommending inpatient acute rehab  Patient is from [place] but is willing to do short term acute rehab   He is not medically ready for discharge        YONATAN (acute kidney injury) (HCC)resolved as of 8/30/2018   Assessment & Plan    · Suspect this was due to NSAIDs and hypotension/sepsis  · Creatinine has completely normalized after IV fluids  · Will continue to avoid NSAIDs  · Can stop IVF  · Would not resume norvasc just yet        Sepsis (HCC)resolved as of 8/31/2018   Assessment & Plan    · Sepsis criteria of new hypotension and YONATAN on 8/29/18-- now resolved after several fluid boluses and albumin; lactic acid normal; procalcitonin elevated at 2 18  · ID following  · Unclear source of bacteremia? ?recent left foot infection  Xray and MRI of foot showed erosion with no osteomyelitis  Podiatry eval appreciated  Patient treated for cellulitis in July with keflex  · Blood cultures 2/2 positive for b hemolytic strep; repeat samples negative thus far  · 2d echo unremarkable  · IV ancef day #3; Anticipate patient will require a prolonged course of IV antibiotics  Case management is following  This will need to be discussed certainly in light of the fact that patient is from [place] and not living locally  Foot pain, left   Assessment & Plan    · Appreciate podiatry eval--possible gout  · MRI noted, no osteo  · Consider steroid injection  · Avoid NSAIDS due to recent YONATAN        Hypokalemia   Assessment & Plan    · Replace again and follow        Alcohol abuse   Assessment & Plan    · Last drink prior to coming to [place]  Not demonstrating any current withdrawal symptoms  · In 12 step program but has not remained consistently sober  · continue thiamine and folic acid  · No evidence of withdrawal  · Also added Ensure for low albumin and poor intake        Elevated LFTs   Assessment & Plan    · Patient reports known h/o mildly elevated LFTs which has been attributed to his alcohol use  Probable underlying cirrhosis  · Denies abdominal pain  · Continue to monitor        Thrombocytopenia [facility])   Assessment & Plan    · Platelet count 58E as of 8/30    Likely due to alcohol use  Not receiving any blood thinners  Follow counts, as these can also drop with sepsis            VTE Pharmacologic Prophylaxis:   Pharmacologic: Pharmacologic VTE Prophylaxis contraindicated due to low platelets  Mechanical VTE Prophylaxis in Place: Yes    Patient Centered Rounds: I have performed bedside rounds with nursing staff today  Discussions with Specialists or Other Care Team Provider:  Spoke with case management, my attending, Infectious Disease  At his request I spoke directly with his neurosurgeon in [place] and also with his family physician    Education and Discussions with Family / Patient:  Called his wife and left a message  3:30 pm spoke with wife with detailed update    Time Spent for Care: 50 minutes  More than 50% of total time spent on counseling and coordination of care as described above  Current Length of Stay: 3 day(s)    Current Patient Status: Inpatient   Certification Statement: The patient will continue to require additional inpatient hospital stay due to Awaiting negative blood cultures at 72 hr before placement of a PICC line and discharge to rehab    Discharge Plan:  Anticipate discharge to acute rehab at [facility] on Tuesday    Code Status: Level 1 - Full Code    Subjective:   Patient continues to report back pain, especially with movement  He states that the regimen he needs to take to control the pain include the oxycodone and he has also required the IV morphine over the past 2 days  He does report some difficulty having bowel movements  His appetite overall remains very poor, although he says he will try harder  His nurse confirms he had a very hard time getting up to chair  Objective:     Vitals:   Temp (24hrs), Av 5 °F (36 9 °C), Min:98 1 °F (36 7 °C), Max:99 1 °F (37 3 °C)    HR:  [76-79] 76  Resp:  [18] 18  BP: (115-143)/(69-82) 136/82  SpO2:  [97 %] 97 %  There is no height or weight on file to calculate BMI       Input and Output Summary (last 24 hours): Intake/Output Summary (Last 24 hours) at 08/31/18 1433  Last data filed at 08/31/18 1321   Gross per 24 hour   Intake             1880 ml   Output             1525 ml   Net              355 ml       Physical Exam:     Physical Exam   Constitutional: He appears well-developed and well-nourished  No distress  HENT:   Head: Normocephalic and atraumatic  Eyes: Conjunctivae are normal  Right eye exhibits no discharge  Left eye exhibits no discharge  No scleral icterus  Cardiovascular: Normal rate and regular rhythm  No murmur heard  Pulmonary/Chest: Effort normal and breath sounds normal  No respiratory distress  He has no wheezes  He has no rales  Abdominal: Soft  He exhibits no distension  There is no tenderness  Musculoskeletal: He exhibits edema (noted some increased redness and swelling in the dorsal aspect of the foot today but NO PAIN)  Neurological: He is alert  Awake alert, answers questions appropriately but still seems forgetful at times   Skin: Skin is warm and dry  No rash noted  He is not diaphoretic  No erythema  No pallor  Psychiatric:   Pleasant and cooperative       Additional Data:     Labs:      Results from last 7 days  Lab Units 08/30/18  0507 08/28/18  1358   WBC Thousand/uL 7 38 5 96   HEMOGLOBIN g/dL 10 9* 12 3   HEMATOCRIT % 31 8* 35 3*   PLATELETS Thousands/uL 46* 52*   NEUTROS PCT %  --  75   LYMPHS PCT %  --  7*   LYMPHO PCT % 13*  --    MONOS PCT %  --  15*   MONO PCT MAN % 13*  --    EOS PCT %  --  1   EOSINO PCT MANUAL % 1  --        Results from last 7 days  Lab Units 08/31/18  0515   SODIUM mmol/L 136   POTASSIUM mmol/L 3 2*   CHLORIDE mmol/L 100   CO2 mmol/L 28   BUN mg/dL 8   CREATININE mg/dL 0 80   CALCIUM mg/dL 8 6   ALK PHOS U/L 191*   ALT U/L 35   AST U/L 51*                   * I Have Reviewed All Lab Data Listed Above  * Additional Pertinent Lab Tests Reviewed:  All Labs Within Last 24 Hours Reviewed    Imaging:    Imaging Reports Reviewed Today Include:   Imaging Personally Reviewed by Myself Includes:      Recent Cultures (last 7 days):       Results from last 7 days  Lab Units 08/30/18  0847 08/28/18  1501 08/28/18  1358   BLOOD CULTURE  No Growth at 24 hrs  No Growth at 24 hrs  Beta Hemolytic Streptococcus Group B* Beta Hemolytic Streptococcus Group B*   GRAM STAIN RESULT   --  Gram positive cocci in chains Gram positive cocci in chains       Last 24 Hours Medication List:     Current Facility-Administered Medications:  cefazolin 2,000 mg Intravenous Q8H Nora Hinojosa PA-C Last Rate: 2,000 mg (08/31/18 1316)   docusate sodium 100 mg Oral BID Nora Hinojosa, KIRAN    folic acid 1 mg Oral Daily Nora Hinojosa, KIRAN    levothyroxine 50 mcg Oral Early Morning Idania Owens, KIRAN    lidocaine 2 patch Transdermal Daily Nora Hinojosa, KIRAN    melatonin 6 mg Oral HS Nora Hinjoosa PA-C    menthol-methyl salicylate  Apply externally TID Nora Hinojosa PA-C    methocarbamol 750 mg Oral Q6H [facility] Nora Hinojosa PA-C    morphine injection 2 mg Intravenous Q4H PRN Yamilet Marquez MD    ondansetron 4 mg Intravenous Q6H PRN Bubba Jason PA-C    oxyCODONE-acetaminophen 2 tablet Oral Q4H PRN Nora Hinojosa, KIRAN    pantoprazole 40 mg Oral Early Morning Yamilet Marquez MD    polyethylene glycol 17 g Oral Daily PRN Nora Hinojosa, KIRAN    senna 1 tablet Oral BID Nora Hinojosa, KIRAN    thiamine 100 mg Oral Daily Nora Hinojosa, KIRAN    traMADol 50 mg Oral Q6H PRN Bubba Jason PA-C         Today, Patient Was Seen By: Cindy Petty PA-C    ** Please Note: Dictation voice to text software may have been used in the creation of this document   **

## 2018-08-31 NOTE — PLAN OF CARE
Problem: OCCUPATIONAL THERAPY ADULT  Goal: Performs self-care activities at highest level of function for planned discharge setting  See evaluation for individualized goals  Treatment Interventions: ADL retraining, Functional transfer training, Endurance training, Equipment evaluation/education, Compensatory technique education, Continued evaluation, Activityengagement  Equipment Recommended: Bedside commode, Tub seat with back       See flowsheet documentation for full assessment, interventions and recommendations  Limitation: Decreased ADL status, Decreased Safe judgement during ADL, Decreased endurance, Decreased self-care trans     Assessment: Pt is a(n) 58 yom [facility] for Discitis of lumbosacral region  HPI: Pt was experiencing severe back pain for over 6 months  Pt presents w/ the following PMH impacting occupational performance: HTN, HLD, Hypothyroidism, Chronic Low Back Pain  Pt reports living in [place] w/ [facility]  Pt has [facility] in [facility] w/ 1st floor setup if needed  PTA pt uses no AD for functional mobility  Pt reports I w I/ADLs + drives at baseline  Upon evaluation, pt was able to carry conversation w/ detail and report on recent events  Pt reported hobbies, interest, and family dynamics  Pt initially uncooperative and resisted therapy  Pt reported to be communicating w/ several different therapists what he has been doing throughout the days  Pt eventually agreeable to therapy  Pt required mod I for beverage mgmt, max A to don socks, and to don LSO brace  Pt LE ROM significantly restricted during ADLs 2* pain  Pt able to roll onto R w/ S and needed mod A x 1 for supine > sit  Transfers completed w/ min A x 2 + time and significant encouragement  VC also needed for proper hand placement  Pt able to take 5 steps in room w/ chair follow w/ min A and VC for max encouragement   Pt presents w/ decreased static sitting balance, standing tolerance, static standing balance, endurance, activity tolerance, sitting tolerance, and significant pain; impacting I w/ functional mobility/transfers, UB dressing, LB dressing, toileting, bathing, community mobility, house management tasks, bed mobility, and leisure activities    While in acute care pt would benefit from OT services to address deficit areas  From an OT perspective, pt would benefit from post acute rehab when medically stable for discharge from acute care to return to [facility]   Will continue to follow     OT Discharge Recommendation:  (Post Acute Rehab)  OT - OK to Discharge:  (When medically stable)

## 2018-08-31 NOTE — CASE MANAGEMENT
Continued Stay Review    Date: 2018    Vital Signs: Temp (24hrs), Av 3 °F (36 8 °C), Min:97 7 °F (36 5 °C), Max:99 1 °F (37 3 °C)     HR:  [76-92] 76  Resp:  [16-20] 18  BP: ()/(50-86) 135/73  SpO2:  [95 %-97 %] 96 %    Medications:   Scheduled Meds:   Current Facility-Administered Medications:  cefazolin 2,000 mg Intravenous Q8H Last Rate: 2,000 mg (18 1316)   docusate sodium 100 mg Oral BID    folic acid 1 mg Oral Daily    levothyroxine 50 mcg Oral Early Morning    lidocaine 2 patch Transdermal Daily    melatonin 6 mg Oral HS    menthol-methyl salicylate  Apply externally TID    methocarbamol 750 mg Oral Q6H [address]    morphine injection 2 mg Intravenous Q4H PRN    ondansetron 4 mg Intravenous Q6H PRN    oxyCODONE-acetaminophen 2 tablet Oral Q4H PRN    pantoprazole 40 mg Oral Early Morning    polyethylene glycol 17 g Oral Daily PRN    senna 1 tablet Oral BID    thiamine 100 mg Oral Daily    traMADol 50 mg Oral Q6H PRN      Continuous Infusions:    PRN Meds: morphine injection 2 mg iv - used x 2  Oxycodone - used x 4  Abnormal Labs/Diagnostic Results:   Wbc 7 38, hgb 10 9, hct 31 8  Platelets 46  Bands 19%  Glucose 148  total bilirubin 1 80  Direct bilirubin 1 59  Alkaline phosphatase 160   ast 60  Total protein 6  Albumin 1 7      Blood culture [[id number]] (Abnormal) Collected: 18 1501   Lab Status: Final result Specimen: Blood from Hand, Left Updated: 18 0956    Blood Culture Beta Hemolytic Streptococcus Group B (A)    Comment: see related culture for Identification and/or Susceptibilitiy       Gram Stain Result Gram positive cocci in chains   Blood culture [[id number]] (Abnormal)  Collected: 18 1358   Lab Status: Final result Specimen: Blood from Arm, Left Updated: 18 8061    Blood Culture Beta Hemolytic Streptococcus Group B (A)    Gram Stain Result Gram positive cocci in chains       Age/Sex: 60 y o  male     Assessment/Plan:   Discitis of lumbosacral region Assessment & Plan     · Presented with intractable lower back pain, also with pain into his left foot and new foot drop after doing significant exertion in his yard and going on a prolonged plane ride  ? History of lower back pain and surgery in [place] 5 years ago for bulging disc at L5  · Patient still reports poorly controlled pain which is not radiculopathic in nature and now noted to be slightly higher up in his spine  Pain regimen=aqua K, menthol rub, lidocaine patch, prn tramadol, prn oxycodone, prn IV morphine, ATC 750mg Robaxin; Avoiding Tylenol given elevated LFTs; stopped NSAIDS due to transient YONATAN  · Neurosurgery also recommending LSO brace  · CT LS spine showed L5S1 disc herniation  · MRI LS spine= There is a rind of abnormal enhancement surrounding the L5-S1 disc space primarily anteriorly and laterally   Subtle fluid signal within the L5-S1 disc also noted   The possibility of discitis cannot be excluded at this level  ESR and CRP noted to be elevated, consistent with discitis  · Initially planned for IR biopsy but now can be cancelled given positive blood cultures  · Appreciate Infectious Disease follow-up  · Neurosurgery also involved  · PT evaluation noted--they are recommending inpatient acute rehab  Patient is from [place] but is willing to do short term acute rehab  He is not medically ready for discharge          Sepsis [facility])   Assessment & Plan     · Sepsis criteria of new hypotension and YONATAN on 8/29/18-- now resolved after several fluid boluses and albumin; lactic acid normal; procalcitonin elevated at 2 18  · ID following  · Unclear source of bacteremia? ?recent left foot infection  Xray and MRI of foot showed erosion with no osteomyelitis  Podiatry eval requested  · Blood cultures 2/2 positive for b hemolytic strep; repeat samples to be obtained today  · 2d echo unremarkable  · IV ancef day #2; Anticipate patient will require a prolonged course of IV antibiotics    Case management is following  This will need to be discussed certainly in light of the fact that patient is from [place] and not living locally  · Given low albumin, store adding nutritional supplements          YONATAN (acute kidney injury) (HCC)resolved as of 8/30/2018   Assessment & Plan     · Suspect this was due to NSAIDs and hypotension/sepsis  · Creatinine has completely normalized after IV fluids  · Will continue to avoid NSAIDs  · Can decrease IVF  · Would not resume norvasc yet          Foot pain, left   Assessment & Plan     · Will ask for podiatry eval          Hypokalemia   Assessment & Plan     · Repleted--follow          Hyponatremiaresolved as of 8/30/2018   Assessment & Plan     · Improved, follow          Alcohol abuse   Assessment & Plan     · Last drink prior to coming to [facility]  Not demonstrating any current withdrawal symptoms  · In 12 step program but has not remained consistently sober  · Add thiamine and folic acid; pending results of B12 level  · No evidence of withdrawal          Elevated LFTs   Assessment & Plan     · Patient reports known h/o mildly elevated LFTs which has been attributed to his alcohol use  Probable underlying cirrhosis  · Denies abdominal pain  · Continue to monitor          Thrombocytopenia (HCC)   Assessment & Plan     · Platelet count 89G  Likely due to alcohol use  Not receiving any blood thinners  Follow counts, as these can also drop with sepsis            Discharge Plan: PT recommends short term rehab  Thank you,  Xenia Barragan Utilization Review Department  Phone: [phone]; Fax [phone]  ATTENTION: Please call with any questions or concerns to [phone]  and carefully follow the prompts so that you are directed to the right person  Send all requests for admission clinical reviews, approved or denied determinations and any other requests to fax [phone]   All voicemails are confidential

## 2018-08-31 NOTE — SOCIAL WORK
CM spoke with patient at bedside  Patient would like more information about [facility]  Patient updated that [facility] is unable to accept patient due to insurance  CM left a SNF list at bedside for patient and wife to review as a second option  Copy of [facility] information left at bedside  CM reached out to PT  Patient requesting exercises he can participate in while resting in bed or in the chair

## 2018-08-31 NOTE — PLAN OF CARE
Problem: PHYSICAL THERAPY ADULT  Goal: Performs mobility at highest level of function for planned discharge setting  See evaluation for individualized goals  Treatment/Interventions: LE strengthening/ROM, Therapeutic exercise, Cognitive reorientation, Equipment eval/education, Bed mobility, Patient/family training  Equipment Recommended: Other (Comment) (pt needs dependent means for out of bed mobilization )       See flowsheet documentation for full assessment, interventions and recommendations  Outcome: Progressing  Prognosis: Fair  Problem List: Decreased strength, Decreased range of motion, Decreased endurance, Decreased mobility, Pain  Assessment: Patient lacks motivation and is limited by pain requiring extensive amounts of encouragement to participate  Demonstrated ability to transfer supine to sit with mod a x 1 and verbal cues for log roll techniuqe  Sit to stand transfers with min a x 2 and verbal instruction for hand placement  Patient requires verbal instructionf or body mechanics with tendency to twist to left and right once standing  Pt able to ambulate 5 steps forward with roller walker and increased amount of time  Participated in supine and seated B LE exercise program with fair understanding, provided patient with handout to reinforce technique and compliance  Continue to focus on OOB mobility tasks with transfer and gait progression as appropriate  Barriers to Discharge: Inaccessible home environment     Recommendation: Short-term skilled PT     PT - OK to Discharge: Yes (to STR when stable )    See flowsheet documentation for full assessment

## 2018-08-31 NOTE — SOCIAL WORK
CM updated by Const[facility] Number that they will f/u on Tuesday with bed availability  Constanza Number will reach out to the family on Tuesday

## 2018-08-31 NOTE — PHYSICAL THERAPY NOTE
PHYSICAL THERAPY NOTE    Patient Name: Kira Hurley  VSFPM'X Date: 18 1443   Pain Assessment   Pain Assessment 0-10   Pain Score 8   Pain Type Acute pain   Pain Location Back   Pain Orientation Lower   Restrictions/Precautions   Weight Bearing Precautions Per Order No   Braces or Orthoses LSO  (when OOB for comfort)   Other Precautions Fall Risk;Pain   General   Family/Caregiver Present No   Subjective   Subjective Patient supine in bed and is agreeable to therapy session  Patient identifers obtained from name,     Bed Mobility   Supine to Sit 3  Moderate assistance   Additional items Assist x 1;HOB elevated; Bedrails; Increased time required;Verbal cues   Sit to Supine Unable to assess   Additional Comments Patient seated OOB in recliner with call bell and belongings in reach  Transfers   Sit to Stand 4  Minimal assistance   Additional items Assist x 2;Armrests; Increased time required;Verbal cues   Stand to Sit 4  Minimal assistance   Additional items Assist x 1; Armrests; Increased time required;Verbal cues   Ambulation/Elevation   Gait pattern Antalgic;Decreased foot clearance;L Foot drag;Excessively slow; Step to; Improper Weight shift   Gait Assistance 4  Minimal assist   Additional items Assist x 2;Verbal cues   Assistive Device Rolling walker   Distance 5 steps forward   Balance   Static Sitting Fair   Dynamic Sitting Fair   Static Standing Poor +   Dynamic Standing Poor   Ambulatory Poor   Endurance Deficit   Endurance Deficit Yes   Endurance Deficit Description limited activity by pain, limited participation   Activity Tolerance   Activity Tolerance Patient limited by fatigue;Patient limited by pain   Nurse Made Aware Spoke to [address] [place] [address], RN    Exercises   [employer] Supine;10 reps;AROM; Bilateral   Heelslides Supine;5 reps;AROM; Bilateral   Glute Sets Supine;10 reps;AROM; Bilateral   Hip Abduction Supine;10 reps;AROM; Bilateral   Knee AROM Long Arc Quad Sitting;10 reps;AROM; Bilateral   Heel Cord Stretch Supine;10 reps;AROM; Bilateral   Assessment   Prognosis Fair   Problem List Decreased strength;Decreased range of motion;Decreased endurance;Decreased mobility;Pain   Assessment Patient lacks motivation and is limited by pain requiring extensive amounts of encouragement to participate  Demonstrated ability to transfer supine to sit with mod a x 1 and verbal cues for log roll techniuqe  Sit to stand transfers with min a x 2 and verbal instruction for hand placement  Patient requires verbal instructionf or body mechanics with tendency to twist to left and right once standing  Pt able to ambulate 5 steps forward with roller walker and increased amount of time  Participated in supine and seated B LE exercise program with fair understanding, provided patient with handout to reinforce technique and compliance  Continue to focus on OOB mobility tasks with transfer and gait progression as appropriate     Barriers to Discharge Inaccessible home environment   Goals   STG Expiration Date 09/07/18   Treatment Day 3   Plan   Treatment/Interventions Functional transfer training   Progress Slow progress, decreased activity tolerance   PT Frequency 5x/wk   Recommendation   Recommendation Short-term skilled PT       Uday Aponte, NORMA

## 2018-08-31 NOTE — OCCUPATIONAL THERAPY NOTE
[address]  Evaluation     Patient Name: Katherine Lu  YFXMD'[id number] Date: 8/31/2018  Problem List  Patient Active Problem List   Diagnosis    Discitis of lumbosacral region    Elevated LFTs    Hypokalemia    Hyperlipidemia    Hypertension    Hypothyroidism    Thrombocytopenia ([address] )    Alcohol abuse    Foot pain, left     Past Medical History  Past Medical History:   Diagnosis Date    Hyperlipidemia     Hypertension     Hypothyroidism     Lower back pain      Past Surgical History  History reviewed  No pertinent surgical history  08/31/18 1438   Note Type   Note type Eval only   Restrictions/Precautions   Weight Bearing Precautions Per Order No   Braces or Orthoses LSO  (For comfort)   Other Precautions Fall Risk;Pain   Pain Assessment   Pain Assessment 0-10   Pain Score 8   Pain Location Back   Pain Orientation Lower   Pain Descriptors Discomfort   Pain Frequency Constant/continuous   Pain Onset Ongoing   Clinical Progression Not changed   Effect of Pain on Daily Activities Limits mobility and activity tolerance   Patient's Stated Pain Goal No pain   Hospital Pain Intervention(s) Medication (See MAR); Repositioned; Ambulation/increased activity; Emotional support   Response to Interventions Limited session   Home Living   Type of [address]  (3STE)   Home Layout One level;Stairs to enter with rails   Bathroom Toilet Standard   Bathroom Accessibility Accessible   Additional Comments Pt taking son to [employer] for college  Pt from [place] w/ [facility]  Pt has [facility] in [facility] w/ 1st floor setup if needed  Prior Function   Level of [place] Independent with ADLs and functional mobility   Lives With Spouse   ADL Assistance Independent   IADLs Independent   Vocational Other (Comment)  ()   Comments Pt is I at baseline, needing no A   Lifestyle   Autonomy Pt is I at baseline w/ many interests and hobbies   Pt currently employed   Reciprocal Relationships Wife at home Service to Others Works as    Intrinsic Gratification Surfing, 2 [facility], [facility] ([address] X   Patient Behaviors/Mood Irritable  (Initially)   Needs Expressed Physical  (Inititally requested to rest)   Ability to Express Feelings Able to express   Ability to Express Needs Able to express   Ability to Express Thoughts Able to express   Ability to Understand Others Understands   Subjective   Subjective "I'm fine if I just lay here"   ADL   Where Assessed Edge of bed   Eating Assistance 6  Modified independent   Eating Deficit Setup  (Beverage mgmt, at EOB and recliner)   UB Dressing Assistance 2  Maximal Assistance   UB Dressing Deficit Setup;Pull around back; Fasteners  (To don LSO brace, pt reported to have donned brace)   LB Dressing Assistance 2  Maximal Assistance  (Pt unable to cross legs, seated at EOB)   LB Dressing Deficit Don/doff R sock; Don/doff L sock   Additional Comments Pt attempted to cross legs at EOB to accomodate pain, technique did not help  Bed Mobility   Rolling R 5  Supervision   Additional items Bedrails; Increased time required;Verbal cues   Supine to Sit 3  Moderate assistance   Additional items Assist x 1;Bedrails; Increased time required;Verbal cues  (max time)   Additional Comments Pt benefited from max time to complete supine > sit  Pt demonstrated significant pain, w/ grimaces   Transfers   Sit to Stand 4  Minimal assistance   Additional items Increased time required;Verbal cues; Assist x 2  (From EOB)   Stand to Sit 4  Minimal assistance   Additional items Armrests; Increased time required;Verbal cues; Assist x 2   Additional Comments Completed w/ RW; pt benefited from significant time to accomodate pain level  VC needed for appropriate hand placement  Functional Mobility   Functional Mobility 4  Minimal assistance   Additional Comments Pt completed 5 steps from bed w/ chair follow   Pt benefited from significant time, needing breaks to compensate for pain  Additional items Rolling walker   Balance   Static Sitting Fair   Static Standing Poor +   Ambulatory Poor +   Activity Tolerance   Activity Tolerance Patient limited by pain   Medical Staff Made Aware NORMA Aguilar, SHONA Loza   Nurse Made Aware RN Lucie HERNANDEZ Assessment   RUE Assessment WFL  (Observed)   RUE Strength   RUE Overall Strength Within Functional Limits - able to perform ADL tasks with strength  (Observed)   LUE Assessment   LUE Assessment WFL  (Observed)   LUE Strength   LUE Overall Strength Within Functional Limits - able to perform ADL tasks with strength  (Observed)   Hand Function   Gross Motor Coordination Functional   Fine Motor Coordination Functional   Sensation   Light Touch Not tested   Additional Comments Post eval: pt seated in recliner chair, needs met and w/ PTA   Proprioception   Proprioception No apparent deficits   Vision - Complex Assessment   Acuity Able to read normal print without difficulty  (Read volume button on remote)   Perception   Inattention/Neglect Appears intact   Motor Planning Appears intact   Perseveration Not present   Cognition   Overall Cognitive Status [facility]   Arousal/Participation Alert; Responsive   Attention Within functional limits   Orientation Level Oriented X4   Memory Within functional limits   Following Commands Follows one step commands without difficulty   Comments Pt identified by full name and   Pt able to carry conversation w/ detail and report on recent events  Pt reported hobbies, interest, and family dynamics  Pt initially uncooperative and resisted therapy  Pt reported to be communicating w/ several different therapists what he has been doing throughout the days  Pt eventually agreeable to therapy   Assessment   Limitation Decreased ADL status; Decreased Safe judgement during ADL;Decreased endurance;Decreased self-care trans   Assessment Pt is a(n) 60 yom [facility] for Discitis of lumbosacral region   HPI: Pt was experiencing severe back pain for over 6 months  Pt presents w/ the following PMH impacting occupational performance: HTN, HLD, Hypothyroidism, Chronic Low Back Pain  Pt reports living in [place] w/ [facility]  Pt has [address] in [facility] w/ 1st floor setup if needed  PTA pt uses no AD for functional mobility  Pt reports I w I/ADLs + drives at baseline  Upon evaluation, pt was able to carry conversation w/ detail and report on recent events  Pt reported hobbies, interest, and family dynamics  Pt initially uncooperative and resisted therapy  Pt reported to be communicating w/ several different therapists what he has been doing throughout the days  Pt eventually agreeable to therapy  Pt required mod I for beverage mgmt, max A to don socks, and to don LSO brace  Pt LE ROM significantly restricted during ADLs 2* pain  Pt able to roll onto R w/ S and needed mod A x 1 for supine > sit  Transfers completed w/ min A x 2 + time and significant encouragement  VC also needed for proper hand placement  Pt able to take 5 steps in room w/ chair follow w/ min A and VC for max encouragement  Pt presents w/ decreased static sitting balance, standing tolerance, static standing balance, endurance, activity tolerance, sitting tolerance, and significant pain; impacting I w/ functional mobility/transfers, UB dressing, LB dressing, toileting, bathing, community mobility, house management tasks, bed mobility, and leisure activities    While in acute care pt would benefit from OT services to address deficit areas  From an OT perspective, pt would benefit from post acute rehab when medically stable for discharge from acute care to return to OF  Will continue to follow   Goals   Patient Goals Not Stated   Plan   Treatment Interventions ADL retraining;Functional transfer training; Endurance training;Equipment evaluation/education; Compensatory technique education;Continued evaluation; Activityengagement   Goal Expiration Date 09/08/18   OT Frequency 3-5x/wk   Recommendation   OT Discharge Recommendation (Post Acute Rehab)   Equipment Recommended Bedside commode;Tub seat with back   OT - OK to Discharge (When medically stable)   Barthel Index   Feeding 10   Bathing 0   Grooming Score 5   Dressing Score 0   Bladder Score 10   Bowels Score 10   Toilet Use Score 5   Transfers (Bed/Chair) Score 10   Mobility (Level Surface) Score 0   Stairs Score 0   Barthel Index Score 50   Modified Hinds Scale   Modified Araceli Scale 4     Pt goals to be met within 8 days:    1  Pt will increase sitting tolerance to 20 minutes while demonstrating fair + balance during ADLs to max I w/ ADL performance at seated level  2  Pt will be able to complete functional transfers <> bed and chair w/ mod I and min VC + least restrictive device in order to max I in the home environment and optimize safety  3  Pt will complete functional transfer <> toilet w/ mod I + least restrictive device in order to return to PLOF during toileting  4  Pt will demonstrate good attention and participation in continued evaluation to assess for DME needs to assist in safe discharge planning  5  Pt will demonstrate good carryover of safety precautions, needing min VC while engaged in functional transfers in order to maintain safety in the home upon d/c  6  Pt will demonstrate proficient recall of DME/AD during functional transfers needing no VC to max I in home to return to home environment  7  Pt will demonstrate G attn and participation of pain mgmt techniques during ADLs to max activity engagement upon d/c home  8  Pt will demonstrate G carryover of pain mgmt techniques needing min VC while dressing in order to maintain safety in d/c planning      [facility]

## 2018-08-31 NOTE — PROGRESS NOTES
Progress Note - Infectious Disease   Faye Johnny 60 y o  male MRN: [id number]  Unit/Bed#: -01 Encounter: [id number]         Impression/Recommendations:  1   Group B strep bacteremia   Blood cultures checked yesterday are both positive for group B strep   Likely the explanation for abnormal MRI findings   TTE is negative for endocarditis   Fortunately, patient remains clinically well, nontoxic  Patient is tolerating IV cefazolin without difficulty  Repeat blood cultures are negative thus far      -continue high-dose cefazolin 2 g IV q 8 hours  -check repeat blood cultures to ensure clearance of bacteremia  -okay to place PICC line once repeat blood cultures are negative at 72 hours       2   Abnormal MRI lumbar spine   Patient presented with acute low back pain   X-ray and CT more suggestive of degenerative changes   MRI showing enhancement at L5-S1 disc space, diskitis cannot be excluded   ESR elevated at 64, CRP greater than 90   Patient with no associated symptoms such as fevers or chills   No leukocytosis  Now patient has group B strep in blood which can be the explanation for diskitis      -antibiotic plan as above  -will require prolonged course of IV antibiotic of 6 weeks  -neurosurgery follow-up ongoing     3   Acute low back pain   Likely related to #1   MRI does show mild bulge at L4   Also moderate narrowing at L4-5 and L5-S1   This may be the cause of back pain   However, there was also enhancement noted at L5-S1 and cannot rule out diskitis        -plan as above  -pain management per primary team  -monitor back pain in neuro exam     4   Abnormal LFTs   As per patient, this is not a new finding   May be related to his history of extensive alcohol abuse   Recommend outpatient workup and monitoring    Will avoid long-term ceftriaxone      5   Thrombocytopenia   Platelets on low side but remains stable   This may also be related to extensive alcohol abuse history   Patient is not from this area so has not had any prior imaging done here   No associated fevers, chills to suggest acute infectious process      6   Lumbar disc herniation with spinal stenosis   With history L5 laminectomy about 5 years ago      7   Left plantar foot pain   Patient states he was treated for left foot cellulitis with oral Keflex last month   He still has mild left plantar foot pain   No obvious evidence of cellulitis on exam   This may be the explanation for group B strep bacteremia  MRI of the foot with no clear evidence of osteomyelitis  It does show some erosive changes at the 1st proximal phalanx and base of 4th metatarsal  Which may be related to underlying gout  Also small joint effusion at the 1st MTP joint  Podiatry evaluation noted with no indication for intervention at this time      -serial foot exams     Antibiotics:  Cefazolin D3     Discussed above plan with  Podiatry attending  Discussed plan with patient  Will plan to see patient again next week  Please call with any questions in the interim  Subjective:  Patient's back pain is under better control  Mild foot pain  Denies fevers, chills, or sweats  Denies nausea, vomiting, or diarrhea  Objective:  Vitals:  HR:  [76-79] 76  Resp:  [18] 18  BP: (115-143)/(69-82) 136/82  SpO2:  [97 %] 97 %  Temp (24hrs), Av 5 °F (36 9 °C), Min:98 1 °F (36 7 °C), Max:99 1 °F (37 3 °C)  Current: Temperature: 98 3 °F (36 8 °C)    Physical Exam:   General:  No acute distress  Psychiatric:  Awake and alert  Pulmonary:  Normal respiratory excursion without accessory muscle use  Abdomen:  Soft, nontender  Extremities:  No edema  Skin:  No rashes    Lab Results:  I have personally reviewed pertinent labs      Results from last 7 days  Lab Units 18  0515 18  0507 18  0542  18  0458   SODIUM mmol/L 136 136 134*  < > 135*   POTASSIUM mmol/L 3 2* 3 5 3 4*  < > 3 3*   CHLORIDE mmol/L 100 104 99*  < > 98*   CO2 mmol/L 28 22 26  < > 25   BUN mg/dL 8 10 12  < > 12   CREATININE mg/dL 0 80 0 92 1 53*  < > 1 27   EGFR ml/min/1 73sq m 98 91 49  < > 61   CALCIUM mg/dL 8 6 8 3 8 7  < > 8 0*   AST U/L 51* 60*  --   --  93*   ALT U/L 35 40  --   --  90*   ALK PHOS U/L 191* 160*  --   --  136*   < > = values in this interval not displayed  Results from last 7 days  Lab Units 08/30/18  0507 08/28/18  1358 08/27/18  0523   WBC Thousand/uL 7 38 5 96 6 25   HEMOGLOBIN g/dL 10 9* 12 3 12 7   PLATELETS Thousands/uL 46* 52* 55*       Results from last 7 days  Lab Units 08/30/18  0847 08/28/18  1501 08/28/18  1358   BLOOD CULTURE  No Growth at 24 hrs  No Growth at 24 hrs  Beta Hemolytic Streptococcus Group B* Beta Hemolytic Streptococcus Group B*   GRAM STAIN RESULT   --  Gram positive cocci in chains Gram positive cocci in chains       Imaging Studies:   I have personally reviewed pertinent imaging study reports and images in PACS  EKG, Pathology, and Other Studies:   I have personally reviewed pertinent reports

## 2018-08-31 NOTE — ASSESSMENT & PLAN NOTE
· Suspect this was due to NSAIDs and hypotension/sepsis  · Creatinine has completely normalized after IV fluids      · Will continue to avoid NSAIDs  · Can stop IVF  · Would not resume norvasc just yet

## 2018-08-31 NOTE — ASSESSMENT & PLAN NOTE
· Platelet count 83T as of 8/30  Likely due to alcohol use  Not receiving any blood thinners    Follow counts, as these can also drop with sepsis

## 2018-08-31 NOTE — ASSESSMENT & PLAN NOTE
· Presented with intractable lower back pain, also with pain into his left foot and new foot drop after doing significant exertion in his yard and going on a prolonged plane ride  · History of lower back pain and surgery in [place] 5 years ago for bulging disc at L5  · Patient still reports poorly controlled pain which is not radiculopathic in nature and now noted to be slightly higher up in his spine  Pain regimen=aqua K, menthol rub, lidocaine patch, prn tramadol for moderate pain, today I changed 5 mg oxy to 10 mg percocet today--however I did speak with the patient in detail about the fact that I am concerned that he is now requiring more medication for pain  Will be following along with this closely especially because he seems cognitively slow at times  Also continue prn IV morphine, ATC 750mg Robaxin;  stopped NSAIDS due to transient YONATAN  · Neurosurgery also recommending LSO brace  · CT LS spine showed L5S1 disc herniation  · MRI LS spine= There is a rind of abnormal enhancement surrounding the L5-S1 disc space primarily anteriorly and laterally  Subtle fluid signal within the L5-S1 disc also noted  The possibility of discitis cannot be excluded at this level  ESR and CRP noted to be elevated, consistent with discitis  · Initially planned for IR biopsy but this was not needed given positive blood cultures  · Appreciate Infectious Disease ongoing follow-up; Neurosurgery also available as needed  · PT evaluation noted--they are recommending inpatient acute rehab  Patient is from [place] but is willing to do short term acute rehab   He is not medically ready for discharge

## 2018-08-31 NOTE — ASSESSMENT & PLAN NOTE
· Appreciate podiatry eval--possible gout  · MRI noted, no osteo  · Consider steroid injection  · Avoid NSAIDS due to recent YONATAN

## 2018-08-31 NOTE — CASE MANAGEMENT
Continued Stay Review    Date: 8/31/2018  Age/Sex: 60 y o  male   Assessment/Plan: This is a 60year old man, admitted due to back pain, diagnosed with Group B strep bacteremia and suspected left foot acute gout with pain  He has discitis of his back requiring long term antibiotics  Continued inpatient care is required for persistently positive blood cultures requiring IV antibiotics and follow up on cultures, pain control, currently requiring IV analgesia  Vital Signs: /82 (BP Location: Right arm)   Pulse 76   Temp 98 3 °F (36 8 °C) (Oral)   Resp 18   Wt 75 5 kg (166 lb 7 2 oz)   SpO2 97%     Medications:   Scheduled Meds:   Current Facility-Administered Medications:  cefazolin 2,000 mg Intravenous Q8H Last Rate: 2,000 mg (08/31/18 0509)   docusate sodium 100 mg Oral BID    folic acid 1 mg Oral Daily    levothyroxine 50 mcg Oral Early Morning    lidocaine 2 patch Transdermal Daily    melatonin 6 mg Oral HS    menthol-methyl salicylate  Apply externally TID    methocarbamol 750 mg Oral Q6H [address]    morphine injection 2 mg Intravenous Q4H PRN    ondansetron 4 mg Intravenous Q6H PRN    oxyCODONE-acetaminophen 2 tablet Oral Q4H PRN    pantoprazole 40 mg Oral Early Morning    polyethylene glycol 17 g Oral Daily PRN    potassium chloride 40 mEq Oral Once    senna 1 tablet Oral BID    thiamine 100 mg Oral Daily    traMADol 50 mg Oral Q6H PRN      Continuous Infusions:    PRN Meds: morphine injection  2 mg iv - used x 1  Oxycodone - used x 2  Abnormal Labs/Diagnostic Results:   k 3 2  Glucose 175   ast 51  Alkaline phosphatase 191  Total protein 6 1  Albumin 1 7  Blood culture [[id number]] Collected: 08/30/18 0847   Lab Status: Preliminary result Specimen: Blood from Arm, Right Updated: 08/31/18 1301    Blood Culture No Growth at 24 hrs     Blood culture [[id number]] Collected: 08/30/18 0847   Lab Status: Preliminary result Specimen: Blood from Arm, Right Updated: 08/31/18 1301    Blood Culture No Growth at 24 hrs  Blood culture [[id number]] (Abnormal) Collected: 08/28/18 1501   Lab Status: Final result Specimen: Blood from Hand, Left Updated: 08/31/18 0956    Blood Culture Beta Hemolytic Streptococcus Group B (A)    Comment: see related culture for Identification and/or Susceptibilitiy       Gram Stain Result Gram positive cocci in chains   Blood culture [[id number]] (Abnormal)  Collected: 08/28/18 1358   Lab Status: Final result Specimen: Blood from Arm, Left Updated: 08/30/18 2718    Blood Culture Beta Hemolytic Streptococcus Group B (A)    Gram Stain Result Gram positive cocci in chains     MRI left foot 8/30/2018- No finding to suggest osteomyelitis  Small joint effusion at the level of the 1st metatarsophalangeal joint  Soft tissue swelling and edema at the dorsal aspect of the forefoot  Erosive changes at the distal medial aspect of the 1st proximal phalanx and at the base of the 4th metatarsal, evaluate for erosive arthritis/gout    Discharge Plan: PT recommends short term skilled PT                      Thank you,  [address]  Utilization Review Department  Phone: [phone]; Fax [phone]  ATTENTION: Please call with any questions or concerns to [phone]  and carefully follow the prompts so that you are directed to the right person  Send all requests for admission clinical reviews, approved or denied determinations and any other requests to fax [phone]   All voicemails are confidential

## 2018-08-31 NOTE — ASSESSMENT & PLAN NOTE
· Sepsis criteria of new hypotension and YONATAN on 8/29/18-- now resolved after several fluid boluses and albumin; lactic acid normal; procalcitonin elevated at 2 18  · ID following  · Unclear source of bacteremia? ?recent left foot infection  Xray and MRI of foot showed erosion with no osteomyelitis  Podiatry eval appreciated  Patient treated for cellulitis in July with keflex  · Blood cultures 2/2 positive for b hemolytic strep; repeat samples negative thus far  · 2d echo unremarkable  · IV ancef day #3; Anticipate patient will require a prolonged course of IV antibiotics  Case management is following  This will need to be discussed certainly in light of the fact that patient is from [place] and not living locally

## 2018-08-31 NOTE — ASSESSMENT & PLAN NOTE
· Last drink prior to coming to [place]    Not demonstrating any current withdrawal symptoms  · In 12 step program but has not remained consistently sober  · continue thiamine and folic acid  · No evidence of withdrawal  · Also added Ensure for low albumin and poor intake

## 2018-08-31 NOTE — PLAN OF CARE
Problem: Potential for Falls  Goal: Patient will remain free of falls  INTERVENTIONS:  - Assess patient frequently for physical needs  -  Identify cognitive and physical deficits and behaviors that affect risk of falls    -  [place] fall precautions as indicated by assessment   - Educate patient/family on patient safety including physical limitations  - Instruct patient to call for assistance with activity based on assessment  - Modify environment to reduce risk of injury  - Consider OT/PT consult to assist with strengthening/mobility   Outcome: Progressing      Problem: MUSCULOSKELETAL - ADULT  Goal: Maintain or return mobility to safest level of function  INTERVENTIONS:  - Assess patient's ability to carry out ADLs; assess patient's baseline for ADL function and identify physical deficits which impact ability to perform ADLs (bathing, care of mouth/teeth, toileting, grooming, dressing, etc )  - Assess/evaluate cause of self-care deficits   - Assess range of motion  - Assess patient's mobility; develop plan if impaired  - Assess patient's need for assistive devices and provide as appropriate  - Encourage maximum independence but intervene and supervise when necessary  - Involve family in performance of ADLs  - Assess for home care needs following discharge   - Request OT consult to assist with ADL evaluation and planning for discharge  - Provide patient education as appropriate   Outcome: Not Progressing      Problem: PAIN - ADULT  Goal: Verbalizes/displays adequate comfort level or baseline comfort level  Interventions:  - Encourage patient to monitor pain and request assistance  - Assess pain using appropriate pain scale  - Administer analgesics based on type and severity of pain and evaluate response  - Implement non-pharmacological measures as appropriate and evaluate response  - Consider cultural and social influences on pain and pain management  - Notify physician/advanced practitioner if interventions unsuccessful or patient reports new pain   Outcome: Not Progressing      Problem: Prexisting or High Potential for Compromised Skin Integrity  Goal: Skin integrity is maintained or improved  INTERVENTIONS:  - Identify patients at risk for skin breakdown  - Assess and monitor skin integrity  - Assess and monitor nutrition and hydration status  - Monitor labs (i e  albumin)  - Assess for incontinence   - Turn and reposition patient  - Assist with mobility/ambulation  - Relieve pressure over bony prominences  - Avoid friction and shearing  - Provide appropriate hygiene as needed including keeping skin clean and dry  - Evaluate need for skin moisturizer/barrier cream  - Collaborate with interdisciplinary team (i e  Nutrition, Rehabilitation, etc )   - Patient/family teaching   Outcome: Progressing      Problem: DISCHARGE PLANNING - CARE MANAGEMENT  Goal: Discharge to post-acute care or home with appropriate resources  INTERVENTIONS:  - Conduct assessment to determine patient/family and health care team treatment goals, and need for post-acute services based on payer coverage, community resources, and patient preferences, and barriers to discharge  - Address psychosocial, clinical, and financial barriers to discharge as identified in assessment in conjunction with the patient/family and health care team  - Arrange appropriate level of post-acute services according to patients   needs and preference and payer coverage in collaboration with the physician and health care team  - Communicate with and update the patient/family, physician, and health care team regarding progress on the discharge plan  - Arrange appropriate transportation to post-acute venues   Outcome: Progressing

## 2018-09-01 PROBLEM — B95.1 BACTEREMIA DUE TO GROUP B STREPTOCOCCUS: Status: ACTIVE | Noted: 2018-09-01

## 2018-09-01 PROBLEM — R78.81 BACTEREMIA DUE TO GROUP B STREPTOCOCCUS: Status: ACTIVE | Noted: 2018-09-01

## 2018-09-01 LAB
ANION GAP SERPL CALCULATED.3IONS-SCNC: 5 MMOL/L (ref 4–13)
BUN SERPL-MCNC: 9 MG/DL (ref 5–25)
CALCIUM SERPL-MCNC: 8.8 MG/DL (ref 8.3–10.1)
CHLORIDE SERPL-SCNC: 100 MMOL/L (ref 100–108)
CO2 SERPL-SCNC: 32 MMOL/L (ref 21–32)
CREAT SERPL-MCNC: 0.83 MG/DL (ref 0.6–1.3)
GFR SERPL CREATININE-BSD FRML MDRD: 96 ML/MIN/1.73SQ M
GLUCOSE SERPL-MCNC: 119 MG/DL (ref 65–140)
MAGNESIUM SERPL-MCNC: 1.6 MG/DL (ref 1.6–2.6)
POTASSIUM SERPL-SCNC: 3.8 MMOL/L (ref 3.5–5.3)
SODIUM SERPL-SCNC: 137 MMOL/L (ref 136–145)

## 2018-09-01 PROCEDURE — 80048 BASIC METABOLIC PNL TOTAL CA: CPT | Performed by: PHYSICIAN ASSISTANT

## 2018-09-01 PROCEDURE — 99232 SBSQ HOSP IP/OBS MODERATE 35: CPT | Performed by: INTERNAL MEDICINE

## 2018-09-01 PROCEDURE — 83735 ASSAY OF MAGNESIUM: CPT | Performed by: PHYSICIAN ASSISTANT

## 2018-09-01 RX ADMIN — METHOCARBAMOL 750 MG: 750 TABLET, FILM COATED ORAL at 17:20

## 2018-09-01 RX ADMIN — MORPHINE SULFATE 2 MG: 2 INJECTION, SOLUTION INTRAMUSCULAR; INTRAVENOUS at 00:48

## 2018-09-01 RX ADMIN — METHOCARBAMOL 750 MG: 750 TABLET, FILM COATED ORAL at 05:46

## 2018-09-01 RX ADMIN — LEVOTHYROXINE SODIUM 50 MCG: 50 TABLET ORAL at 05:46

## 2018-09-01 RX ADMIN — FOLIC ACID 1 MG: 1 TABLET ORAL at 08:48

## 2018-09-01 RX ADMIN — OXYCODONE HYDROCHLORIDE AND ACETAMINOPHEN 2 TABLET: 5; 325 TABLET ORAL at 09:45

## 2018-09-01 RX ADMIN — METHOCARBAMOL 750 MG: 750 TABLET, FILM COATED ORAL at 11:56

## 2018-09-01 RX ADMIN — PANTOPRAZOLE SODIUM 40 MG: 40 TABLET, DELAYED RELEASE ORAL at 05:46

## 2018-09-01 RX ADMIN — SENNOSIDES 8.6 MG: 8.6 TABLET, FILM COATED ORAL at 08:48

## 2018-09-01 RX ADMIN — OXYCODONE HYDROCHLORIDE AND ACETAMINOPHEN 2 TABLET: 5; 325 TABLET ORAL at 22:05

## 2018-09-01 RX ADMIN — CEFAZOLIN SODIUM 2000 MG: 2 SOLUTION INTRAVENOUS at 20:57

## 2018-09-01 RX ADMIN — OXYCODONE HYDROCHLORIDE AND ACETAMINOPHEN 2 TABLET: 5; 325 TABLET ORAL at 05:46

## 2018-09-01 RX ADMIN — CEFAZOLIN SODIUM 2000 MG: 2 SOLUTION INTRAVENOUS at 05:46

## 2018-09-01 RX ADMIN — OXYCODONE HYDROCHLORIDE AND ACETAMINOPHEN 2 TABLET: 5; 325 TABLET ORAL at 17:50

## 2018-09-01 RX ADMIN — Medication 100 MG: at 08:48

## 2018-09-01 RX ADMIN — TRAMADOL HYDROCHLORIDE 50 MG: 50 TABLET, COATED ORAL at 08:51

## 2018-09-01 RX ADMIN — METHOCARBAMOL 750 MG: 750 TABLET, FILM COATED ORAL at 23:55

## 2018-09-01 RX ADMIN — LIDOCAINE 2 PATCH: 50 PATCH CUTANEOUS at 08:48

## 2018-09-01 RX ADMIN — OXYCODONE HYDROCHLORIDE AND ACETAMINOPHEN 2 TABLET: 5; 325 TABLET ORAL at 13:46

## 2018-09-01 RX ADMIN — DOCUSATE SODIUM 100 MG: 100 CAPSULE, LIQUID FILLED ORAL at 08:48

## 2018-09-01 RX ADMIN — DOCUSATE SODIUM 100 MG: 100 CAPSULE, LIQUID FILLED ORAL at 17:20

## 2018-09-01 RX ADMIN — SENNOSIDES 8.6 MG: 8.6 TABLET, FILM COATED ORAL at 17:20

## 2018-09-01 RX ADMIN — MELATONIN TAB 3 MG 6 MG: 3 TAB at 22:02

## 2018-09-01 RX ADMIN — MENTHOL, METHYL SALICYLATE: 10; 15 CREAM TOPICAL at 20:57

## 2018-09-01 RX ADMIN — CEFAZOLIN SODIUM 2000 MG: 2 SOLUTION INTRAVENOUS at 13:49

## 2018-09-01 NOTE — PROGRESS NOTES
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      Plan:  Left foot pain is resolving well and will continue to monitor the progress  IV antibiotics per ID  Rest and elevate  WBAT  Subjective/Objective   Chief Complaint:   Chief Complaint   Patient presents with    Back Pain     Pt presents to the ED with severe exacerbation of back pain  Pt reports hx of L5 bulging disc  Pt states pain seems similiar  Pt reports potentional over exertion this week from exercise and working with a sledge hammer  Last med was ibuprofen 5 hrs ago  Subjective: 60 y o  y/o male was seen and evaluated at bedside  His pain is much better  It is about 4-5 out of 10 now  No CP/F/chills/SOB/N/V  Blood pressure 152/84, pulse 74, temperature 98 °F (36 7 °C), temperature source Oral, resp  rate 18, weight 75 5 kg (166 lb 7 2 oz), SpO2 98 %  ,There is no height or weight on file to calculate BMI  Invasive Devices     Peripheral Intravenous Line            Long-Dwell Peripheral IV (Midline) 46/03/77 Left Basilic 3 days                Physical Exam:   General: Alert, cooperative and no distress  Lungs: Non labored breathing  Heart: Positive S1, S2  Abdomen: Soft, non-tender  Extremity: Decreased edema left foot  Mild tenderness around left TNJ  No wound presents left LE  Minimal redness on left LE  Lab, Imaging and other studies:   I have personally reviewed pertinent lab results  Imaging: I have personally reviewed pertinent films in PACS  EKG, Pathology, and Other Studies: I have personally reviewed pertinent reports

## 2018-09-01 NOTE — PROGRESS NOTES
Progress Note - Kellen Osgood 1960, 60 y o  male MRN: [id number]    Unit/Bed#: -01 Encounter: [id number]    Primary Care Provider: No primary care provider on file  Date and time admitted to hospital: 8/25/2018  9:25 PM    * Discitis of lumbosacral region   Assessment & Plan    · Presented with intractable lower back pain, also with pain into his left foot and new foot drop after doing significant exertion in his yard and going on a prolonged plane ride  · History of lower back pain and surgery in [facility] 5 years ago for bulging disc at L5  · Patient reports pain today is slowly improving on current regimen  No NSAIDS secondary to prior YONATNA which has since resolved  · LSO brace recommended per Neurosurgery  · CT LS spine showed L5S1 disc herniation  · MRI LS spine showed"    rind of abnormal enhancement surrounding the L5-S1 disc space primarily anteriorly and laterally  Subtle fluid signal within the L5-S1 disc also noted  The possibility of discitis cannot be excluded at this level " ESR and CRP were elevated, consistent with discitis  · Initially planned for IR biopsy but this was not needed given positive blood cultures  · PT evaluation done and recommended for acute inpatient rehab at discharge  Patient is from [place] but is willing to do short term acute rehab locally prior to return to [place]      Bacteremia due to group B Streptococcus   Assessment & Plan    · Repeat blood cultures negative x 48 hours  · Overall clinically improved but will need long-term IV antibiotics  · On high-dose cephazolin 2 g IV q 8 hours  · PICC line placement once repeat blood cultures negative x 72 hours  · Id following      Foot pain, left   Assessment & Plan    · Status post podiatry evaluation    Suspected possible gout  · MRI noted, no osteo  · Consider steroid injection  · Avoid NSAIDS due to recent YONATAN      Elevated LFTs   Assessment & Plan    · Patient reports known h/o mildly elevated LFTs which has been attributed to his alcohol use  Probable underlying cirrhosis  · Denies abdominal pain  · Continue to monitor      Hypokalemia   Assessment & Plan    · Resolved      Thrombocytopenia (HCC)   Assessment & Plan    · Will repeat counts tomorrow last checked on 18 and was 46  · No signs of bleeding      Alcohol abuse   Assessment & Plan    · Last drink prior to coming to [place]  · In 12 step program but has not remained consistently sober  · Continue thiamine and folic acid  · No symptoms of withdrawal at this time  · Continue nutritional supplements        VTE Pharmacologic Prophylaxis:   Pharmacologic: Pharmacologic VTE Prophylaxis contraindicated due to Thrombocytopenia  Mechanical VTE Prophylaxis in Place: Yes    Patient Centered Rounds: I have performed bedside rounds with nursing staff today  Discussions with Specialists or Other Care Team Provider:   Nursing    Education and Discussions with Family / Patient:   Patient    Current Length of Stay: 4 day(s)    Current Patient Status: Inpatient   Certification Statement: The patient will continue to require additional inpatient hospital stay due to Above diagnosis and care plan    Discharge Plan:   Anticipate discharge to acute rehab at [facility] on Tuesday    Code Status: Level 1 - Full Code      Subjective:   Seen and evaluated  He reports back pain is slowly improving  He is trying to walk and do exercises in his room on his own during the day  He denies any fever  Objective:     Vitals:   Temp (24hrs), Av 4 °F (36 9 °C), Min:98 °F (36 7 °C), Max:98 9 °F (37 2 °C)    HR:  [69-74] 74  Resp:  [18] 18  BP: (123-152)/(71-84) 152/84  SpO2:  [96 %-98 %] 98 %  There is no height or weight on file to calculate BMI  Input and Output Summary (last 24 hours):        Intake/Output Summary (Last 24 hours) at 18 1454  Last data filed at 18 1401   Gross per 24 hour   Intake                0 ml   Output 2775 ml   Net            -2775 ml       Physical Exam:  General Appearance:    Alert, cooperative, no distress, appropriately responsive    Head:    Normocephalic, without obvious abnormality, atraumatic, mucous membranes moist    Eyes:    Conjunctiva/corneas clear, EOM's intact   Neck:   Supple   Lungs:     Clear to auscultation bilaterally, respirations unlabored, no crackles or wheeze     Heart:    Regular rate and rhythm, S1 and S2    Abdomen:     Soft, non-tender, nondistended   Extremities:   Trace edema, no tenderness, no spinal area tenderness   Neurologic:  Alert and oriented x3, ambulating in his room, moving all extremities         Additional Data:     Labs:      Results from last 7 days  Lab Units 08/30/18  0507 08/28/18  1358   WBC Thousand/uL 7 38 5 96   HEMOGLOBIN g/dL 10 9* 12 3   HEMATOCRIT % 31 8* 35 3*   PLATELETS Thousands/uL 46* 52*   NEUTROS PCT %  --  75   LYMPHS PCT %  --  7*   LYMPHO PCT % 13*  --    MONOS PCT %  --  15*   MONO PCT MAN % 13*  --    EOS PCT %  --  1   EOSINO PCT MANUAL % 1  --        Results from last 7 days  Lab Units 09/01/18  0552 08/31/18  0515   SODIUM mmol/L 137 136   POTASSIUM mmol/L 3 8 3 2*   CHLORIDE mmol/L 100 100   CO2 mmol/L 32 28   BUN mg/dL 9 8   CREATININE mg/dL 0 83 0 80   CALCIUM mg/dL 8 8 8 6   ALK PHOS U/L  --  191*   ALT U/L  --  35   AST U/L  --  51*           * I Have Reviewed All Lab Data Listed Above  * Additional Pertinent Lab Tests Reviewed:  Mary 66 Admission Reviewed    Cultures:   Blood Culture:   Lab Results   Component Value Date    BLOODCX No Growth at 48 hrs  08/30/2018    BLOODCX No Growth at 48 hrs  08/30/2018    BLOODCX Beta Hemolytic Streptococcus Group B (A) 08/28/2018    BLOODCX Beta Hemolytic Streptococcus Group B (A) 08/28/2018     Urine Culture: No results found for: URINECX  Sputum Culture: No components found for: SPUTUMCX  Wound Culture: No results found for: WOUNDCULT    Last 24 Hours Medication List: Current Facility-Administered Medications:  cefazolin 2,000 mg Intravenous Q8H Nora Hinojosa PA-C Last Rate: 2,000 mg (09/01/18 1349)   docusate sodium 100 mg Oral BID Nora Hinojosa PA-C    folic acid 1 mg Oral Daily Nora Hinojosa PA-C    levothyroxine 50 mcg Oral Early Morning Idania OwensKIRAN    lidocaine 2 patch Transdermal Daily Nora Hinojosa PA-C    melatonin 6 mg Oral HS Nora Hinojosa PA-C    menthol-methyl salicylate  Apply externally TID Nora Hinojosa PA-C    methocarbamol 750 mg Oral Q6H [facility] Nora Hinojosa PA-C    morphine injection 2 mg Intravenous Q4H PRN Ymailet Marquez MD    ondansetron 4 mg Intravenous Q6H PRN Abida Lauren PA-C    oxyCODONE-acetaminophen 2 tablet Oral Q4H PRN Nora Hinojosa PA-C    pantoprazole 40 mg Oral Early Morning Yamilet Marquez MD    polyethylene glycol 17 g Oral Daily PRN Nora Hinojosa PA-C    senna 1 tablet Oral BID Nora Hinojosa PA-C    thiamine 100 mg Oral Daily Nora Hinojosa PA-C    traMADol 50 mg Oral Q6H PRN Abida Luaren PA-C         Today, Patient Was Seen By: Lory Hughes MD    ** Please Note: Dragon 360 Dictation voice to text software may have been used in the creation of this document   **

## 2018-09-01 NOTE — CASE MANAGEMENT
Continued Stay Review    Date:9/1/2018  CC:  Back pain slowly improving    Vital Signs: /72 (BP Location: Right arm)   Pulse 76   Temp 98 4 °F (36 9 °C) (Oral)   Resp 18   Wt 75 5 kg (166 lb 7 2 oz)   SpO2 96%     Medications:   Scheduled Meds:   Current Facility-Administered Medications:  cefazolin 2,000 mg Intravenous Q8H Last Rate: 2,000 mg (09/01/18 1349)   docusate sodium 100 mg Oral BID    folic acid 1 mg Oral Daily    levothyroxine 50 mcg Oral Early Morning    lidocaine 2 patch Transdermal Daily    melatonin 6 mg Oral HS    menthol-methyl salicylate  Apply externally TID    methocarbamol 750 mg Oral Q6H [facility]    morphine injection 2 mg Intravenous Q4H PRN    ondansetron 4 mg Intravenous Q6H PRN    oxyCODONE-acetaminophen 2 tablet Oral Q4H PRN    pantoprazole 40 mg Oral Early Morning    polyethylene glycol 17 g Oral Daily PRN    senna 1 tablet Oral BID    thiamine 100 mg Oral Daily    traMADol 50 mg Oral Q6H PRN      Continuous Infusions:    PRN Meds: morphine injection 2 mg iv - used x 1      oxyCODONE-acetaminophen - used x 3      traMADol - used x 1  Abnormal Labs/Diagnostic Results:   Blood culture [[id number]] Collected: 08/30/18 0847   Lab Status: Preliminary result Specimen: Blood from Arm, Right Updated: 09/01/18 1301    Blood Culture No Growth at 48 hrs  Blood culture [[id number]] Collected: 08/30/18 0847   Lab Status: Preliminary result Specimen: Blood from Arm, Right Updated: 09/01/18 1301    Blood Culture No Growth at 48 hrs     Blood culture [[id number]] (Abnormal) Collected: 08/28/18 1501   Lab Status: Final result Specimen: Blood from Hand, Left Updated: 08/31/18 0956    Blood Culture Beta Hemolytic Streptococcus Group B (A)    Comment: see related culture for Identification and/or Susceptibilitiy       Gram Stain Result Gram positive cocci in chains   Blood culture [[id number]] (Abnormal)  Collected: 08/28/18 1352   Lab Status: Final result Specimen: Blood from Arm, Left Updated: 08/30/18 0823    Blood Culture Beta Hemolytic Streptococcus Group B (A)    Gram Stain Result Gram positive cocci in chains       Age/Sex: 60 y o  male     Assessment/Plan:   Discitis of lumbosacral region   Assessment & Plan     · Presented with intractable lower back pain, also with pain into his left foot and new foot drop after doing significant exertion in his yard and going on a prolonged plane ride  · History of lower back pain and surgery in [facility] 5 years ago for bulging disc at L5  · Patient reports pain today is slowly improving on current regimen  No NSAIDS secondary to prior YONATAN which has since resolved  · LSO brace recommended per Neurosurgery  · CT LS spine showed L5S1 disc herniation  · MRI LS spine showed"    rind of abnormal enhancement surrounding the L5-S1 disc space primarily anteriorly and laterally  Subtle fluid signal within the L5-S1 disc also noted   The possibility of discitis cannot be excluded at this level " ESR and CRP were elevated, consistent with discitis  · Initially planned for IR biopsy but this was not needed given positive blood cultures  · PT evaluation done and recommended for acute inpatient rehab at discharge  Patient is from [place] but is willing to do short term acute rehab locally prior to return to [place]       Bacteremia due to group B Streptococcus   Assessment & Plan     · Repeat blood cultures negative x 48 hours  · Overall clinically improved but will need long-term IV antibiotics  · On high-dose cephazolin 2 g IV q 8 hours  · PICC line placement once repeat blood cultures negative x 72 hours  · Id following       Foot pain, left   Assessment & Plan     · Status post podiatry evaluation  Suspected possible gout  · MRI noted, no osteo  · Consider steroid injection  · Avoid NSAIDS due to recent YONATAN       Elevated LFTs   Assessment & Plan     · Patient reports known h/o mildly elevated LFTs which has been attributed to his alcohol use    Probable underlying cirrhosis  · Denies abdominal pain  · Continue to monitor       Hypokalemia   Assessment & Plan     · Resolved       Thrombocytopenia (HCC)   Assessment & Plan     · Will repeat counts tomorrow last checked on 8/30/18 and was 46  · No signs of bleeding       Alcohol abuse   Assessment & Plan     · Last drink prior to coming to [place]  · In 12 step program but has not remained consistently sober  · Continue thiamine and folic acid  · No symptoms of withdrawal at this time  · Continue nutritional supplements         Discharge Plan: PT recommends short term skilled PT                      Thank you,  [address]  Utilization Review Department  Phone: [phone]; Fax [phone]  ATTENTION: Please call with any questions or concerns to [phone]  and carefully follow the prompts so that you are directed to the right person  Send all requests for admission clinical reviews, approved or denied determinations and any other requests to fax [phone]   All voicemails are confidential

## 2018-09-01 NOTE — ASSESSMENT & PLAN NOTE
· Presented with intractable lower back pain, also with pain into his left foot and new foot drop after doing significant exertion in his yard and going on a prolonged plane ride  · History of lower back pain and surgery in [place] 5 years ago for bulging disc at L5  · Patient reports pain today is slowly improving on current regimen  No NSAIDS secondary to prior YONATAN which has since resolved  · LSO brace recommended per Neurosurgery  · CT LS spine showed L5S1 disc herniation  · MRI LS spine showed"    rind of abnormal enhancement surrounding the L5-S1 disc space primarily anteriorly and laterally  Subtle fluid signal within the L5-S1 disc also noted  The possibility of discitis cannot be excluded at this level " ESR and CRP were elevated, consistent with discitis  · Initially planned for IR biopsy but this was not needed given positive blood cultures  · PT evaluation done and recommended for acute inpatient rehab at discharge    Patient is from [place] but is willing to do short term acute rehab locally prior to return to [place]

## 2018-09-01 NOTE — ASSESSMENT & PLAN NOTE
· Status post podiatry evaluation    Suspected possible gout  · MRI noted, no osteo  · Consider steroid injection  · Avoid NSAIDS due to recent YONATAN

## 2018-09-01 NOTE — ASSESSMENT & PLAN NOTE
· Repeat blood cultures negative x 48 hours  · Overall clinically improved but will need long-term IV antibiotics  · On high-dose cephazolin 2 g IV q 8 hours  · PICC line placement once repeat blood cultures negative x 72 hours  · Id following

## 2018-09-01 NOTE — ASSESSMENT & PLAN NOTE
· Last drink prior to coming to [place]  · In 12 step program but has not remained consistently sober  · Continue thiamine and folic acid  · No symptoms of withdrawal at this time  · Continue nutritional supplements

## 2018-09-02 PROBLEM — E87.6 HYPOKALEMIA: Status: RESOLVED | Noted: 2018-08-26 | Resolved: 2018-09-02

## 2018-09-02 LAB
BASOPHILS # BLD AUTO: 0.03 THOUSANDS/ΜL (ref 0–0.1)
BASOPHILS NFR BLD AUTO: 1 % (ref 0–1)
EOSINOPHIL # BLD AUTO: 0.08 THOUSAND/ΜL (ref 0–0.61)
EOSINOPHIL NFR BLD AUTO: 1 % (ref 0–6)
ERYTHROCYTE [DISTWIDTH] IN BLOOD BY AUTOMATED COUNT: 13.5 % (ref 11.6–15.1)
HCT VFR BLD AUTO: 32.8 % (ref 36.5–49.3)
HGB BLD-MCNC: 11.2 G/DL (ref 12–17)
IMM GRANULOCYTES # BLD AUTO: 0.07 THOUSAND/UL (ref 0–0.2)
IMM GRANULOCYTES NFR BLD AUTO: 1 % (ref 0–2)
LYMPHOCYTES # BLD AUTO: 1.16 THOUSANDS/ΜL (ref 0.6–4.47)
LYMPHOCYTES NFR BLD AUTO: 19 % (ref 14–44)
MCH RBC QN AUTO: 32.7 PG (ref 26.8–34.3)
MCHC RBC AUTO-ENTMCNC: 34.1 G/DL (ref 31.4–37.4)
MCV RBC AUTO: 96 FL (ref 82–98)
MONOCYTES # BLD AUTO: 0.85 THOUSAND/ΜL (ref 0.17–1.22)
MONOCYTES NFR BLD AUTO: 14 % (ref 4–12)
NEUTROPHILS # BLD AUTO: 3.83 THOUSANDS/ΜL (ref 1.85–7.62)
NEUTS SEG NFR BLD AUTO: 64 % (ref 43–75)
NRBC BLD AUTO-RTO: 0 /100 WBCS
PLATELET # BLD AUTO: 84 THOUSANDS/UL (ref 149–390)
PMV BLD AUTO: 10.5 FL (ref 8.9–12.7)
RBC # BLD AUTO: 3.43 MILLION/UL (ref 3.88–5.62)
WBC # BLD AUTO: 6.02 THOUSAND/UL (ref 4.31–10.16)

## 2018-09-02 PROCEDURE — 99232 SBSQ HOSP IP/OBS MODERATE 35: CPT | Performed by: INTERNAL MEDICINE

## 2018-09-02 PROCEDURE — 85025 COMPLETE CBC W/AUTO DIFF WBC: CPT | Performed by: INTERNAL MEDICINE

## 2018-09-02 RX ADMIN — MELATONIN TAB 3 MG 6 MG: 3 TAB at 22:46

## 2018-09-02 RX ADMIN — MORPHINE SULFATE 2 MG: 2 INJECTION, SOLUTION INTRAMUSCULAR; INTRAVENOUS at 06:55

## 2018-09-02 RX ADMIN — METHOCARBAMOL 750 MG: 750 TABLET, FILM COATED ORAL at 11:04

## 2018-09-02 RX ADMIN — METHOCARBAMOL 750 MG: 750 TABLET, FILM COATED ORAL at 17:10

## 2018-09-02 RX ADMIN — DOCUSATE SODIUM 100 MG: 100 CAPSULE, LIQUID FILLED ORAL at 17:10

## 2018-09-02 RX ADMIN — CEFAZOLIN SODIUM 2000 MG: 2 SOLUTION INTRAVENOUS at 05:27

## 2018-09-02 RX ADMIN — OXYCODONE HYDROCHLORIDE AND ACETAMINOPHEN 2 TABLET: 5; 325 TABLET ORAL at 20:39

## 2018-09-02 RX ADMIN — SENNOSIDES 8.6 MG: 8.6 TABLET, FILM COATED ORAL at 08:22

## 2018-09-02 RX ADMIN — METHOCARBAMOL 750 MG: 750 TABLET, FILM COATED ORAL at 05:27

## 2018-09-02 RX ADMIN — LEVOTHYROXINE SODIUM 50 MCG: 50 TABLET ORAL at 05:27

## 2018-09-02 RX ADMIN — CEFAZOLIN SODIUM 2000 MG: 2 SOLUTION INTRAVENOUS at 14:06

## 2018-09-02 RX ADMIN — PANTOPRAZOLE SODIUM 40 MG: 40 TABLET, DELAYED RELEASE ORAL at 05:27

## 2018-09-02 RX ADMIN — DOCUSATE SODIUM 100 MG: 100 CAPSULE, LIQUID FILLED ORAL at 08:22

## 2018-09-02 RX ADMIN — Medication 100 MG: at 08:22

## 2018-09-02 RX ADMIN — OXYCODONE HYDROCHLORIDE AND ACETAMINOPHEN 2 TABLET: 5; 325 TABLET ORAL at 08:22

## 2018-09-02 RX ADMIN — OXYCODONE HYDROCHLORIDE AND ACETAMINOPHEN 2 TABLET: 5; 325 TABLET ORAL at 12:24

## 2018-09-02 RX ADMIN — OXYCODONE HYDROCHLORIDE AND ACETAMINOPHEN 2 TABLET: 5; 325 TABLET ORAL at 16:23

## 2018-09-02 RX ADMIN — FOLIC ACID 1 MG: 1 TABLET ORAL at 08:22

## 2018-09-02 RX ADMIN — SENNOSIDES 8.6 MG: 8.6 TABLET, FILM COATED ORAL at 17:10

## 2018-09-02 RX ADMIN — CEFAZOLIN SODIUM 2000 MG: 2 SOLUTION INTRAVENOUS at 20:40

## 2018-09-02 NOTE — ASSESSMENT & PLAN NOTE
· Status post podiatry evaluation  Suspected possible gout  · MRI noted, no osteo  · Currently stable    Avoid NSAIDS secondary to recent YONATAN

## 2018-09-02 NOTE — PROGRESS NOTES
Progress Note - Elease Saliva 1960, 60 y o  male MRN: [id number]    Unit/Bed#: -01 Encounter: [id number]    Primary Care Provider: No primary care provider on file  Date and time admitted to hospital: 8/25/2018  9:25 PM    * Discitis of lumbosacral region   Assessment & Plan    · Presented with intractable lower back pain, also with pain into his left foot and new foot drop after doing significant exertion in his yard and going on a prolonged plane ride  · History of lower back pain and surgery in [facility] 5 years ago for bulging disc at L5  · Patient reports pain today is slowly improving on current regimen  No NSAIDS secondary to prior YONATAN which has since resolved  · LSO brace recommended per Neurosurgery  · CT LS spine showed L5S1 disc herniation  · MRI LS spine showed"    rind of abnormal enhancement surrounding the L5-S1 disc space primarily anteriorly and laterally  Subtle fluid signal within the L5-S1 disc also noted  The possibility of discitis cannot be excluded at this level " ESR and CRP were elevated, consistent with discitis  · Initially planned for IR biopsy but this was not needed given positive blood cultures  · PT evaluation done and recommended for acute inpatient rehab at discharge  Patient is from [place] but is willing to do short term acute rehab locally prior to return to [place]      Bacteremia due to group B Streptococcus   Assessment & Plan    · Repeat blood cultures negative x 72 hours  · Overall clinically improved but will need long-term IV antibiotics  · On high-dose cephazolin 2 g IV q 8 hours  · IR consulted for PICC line placement  Consent obtained and placed in chart      Foot pain, left   Assessment & Plan    · Status post podiatry evaluation  Suspected possible gout  · MRI noted, no osteo  · Currently stable    Avoid NSAIDS secondary to recent YONATAN      Elevated LFTs   Assessment & Plan    · Patient reports known h/o mildly elevated LFTs which has been attributed to his alcohol use  Probable underlying cirrhosis  · Denies abdominal pain  · Continue to monitor      Thrombocytopenia (HCC)   Assessment & Plan    · Improved at 84 this morning  · No signs of bleeding      Alcohol abuse   Assessment & Plan    · Last drink prior to coming to [place]  · In 12 step program but has not remained consistently sober  · Continue thiamine and folic acid  · No symptoms of withdrawal currently  · Continue nutritional supplements      Hypokalemiaresolved as of 2018   Assessment & Plan    · Resolved        VTE Pharmacologic Prophylaxis:   Pharmacologic: Pharmacologic VTE Prophylaxis contraindicated due to Thrombocytopenia  Mechanical VTE Prophylaxis in Place: Yes    Patient Centered Rounds: I have performed bedside rounds with nursing staff today  Discussions with Specialists or Other Care Team Provider:   Nursing    Education and Discussions with Family / Patient: Patient    Current Length of Stay: 5 day(s)    Current Patient Status: Inpatient   Certification Statement: The patient will continue to require additional inpatient hospital stay due to Above diagnosis and care plan    Discharge Plan:   Anticipate discharge to [facility] acute rehab on Tuesday    Code Status: Level 1 - Full Code      Subjective:   No new complaints or acute overnight events  Objective:     Vitals:   Temp (24hrs), Av 2 °F (36 8 °C), Min:98 °F (36 7 °C), Max:98 4 °F (36 9 °C)    HR:  [59-76] 71  Resp:  [18] 18  BP: (151-162)/(72-80) 162/79  SpO2:  [96 %-97 %] 97 %  There is no height or weight on file to calculate BMI  Input and Output Summary (last 24 hours):        Intake/Output Summary (Last 24 hours) at 18 1410  Last data filed at 18 1300   Gross per 24 hour   Intake             1060 ml   Output             4125 ml   Net            -3065 ml       Physical Exam:  General Appearance:    Alert, cooperative, no distress, appropriately responsive    Head: Normocephalic, without obvious abnormality, atraumatic, mucous membranes moist    Eyes:    Conjunctiva/corneas clear, EOM's intact   Neck:   Supple   Lungs:     Clear bilaterally, no crackles or wheeze    Heart:    Regular rate and rhythm, S1 and S2    Abdomen:     Soft, non-tender, nondistended, no palpable organomegaly   Extremities:   No edema   Neurologic:  Alert and oriented x3, appropriately responsive, moving all extremities         Additional Data:     Labs:      Results from last 7 days  Lab Units 09/02/18  0731   WBC Thousand/uL 6 02   HEMOGLOBIN g/dL 11 2*   HEMATOCRIT % 32 8*   PLATELETS Thousands/uL 84*   NEUTROS PCT % 64   LYMPHS PCT % 19   MONOS PCT % 14*   EOS PCT % 1       Results from last 7 days  Lab Units 09/01/18  0552 08/31/18  0515   SODIUM mmol/L 137 136   POTASSIUM mmol/L 3 8 3 2*   CHLORIDE mmol/L 100 100   CO2 mmol/L 32 28   BUN mg/dL 9 8   CREATININE mg/dL 0 83 0 80   CALCIUM mg/dL 8 8 8 6   ALK PHOS U/L  --  191*   ALT U/L  --  35   AST U/L  --  51*           * I Have Reviewed All Lab Data Listed Above  * Additional Pertinent Lab Tests Reviewed:  All Labs Within Last 24 Hours Reviewed    Cultures:   Blood Culture:   Lab Results   Component Value Date    BLOODCX No Growth at 72 hrs  08/30/2018    BLOODCX No Growth at 72 hrs  08/30/2018    BLOODCX Beta Hemolytic Streptococcus Group B (A) 08/28/2018    BLOODCX Beta Hemolytic Streptococcus Group B (A) 08/28/2018     Urine Culture: No results found for: URINECX  Sputum Culture: No components found for: SPUTUMCX  Wound Culture: No results found for: WOUNDCULT    Last 24 Hours Medication List:     Current Facility-Administered Medications:  cefazolin 2,000 mg Intravenous Q8H Nora Hinojosa PA-C Last Rate: 2,000 mg (09/02/18 1406)   docusate sodium 100 mg Oral BID Nora Hinojosa PA-C    folic acid 1 mg Oral Daily Nora Hinojosa PA-C    levothyroxine 50 mcg Oral Early Morning Bri Serrano PA-C    lidocaine 2 patch Transdermal Daily Nora KIRAN Hinojosa    melatonin 6 mg Oral HS Nora Hinojosa PA-C    menthol-methyl salicylate  Apply externally TID Nora Hinojosa PA-C    methocarbamol 750 mg Oral Q6H [address] Nora Hinojosa PA-C    morphine injection 2 mg Intravenous Q4H PRN Yamilet Marquez MD    ondansetron 4 mg Intravenous Q6H PRN Aidan Murray PA-C    oxyCODONE-acetaminophen 2 tablet Oral Q4H PRN Nora Hinojosa PA-C    pantoprazole 40 mg Oral Early Morning Yamilet Marquez MD    polyethylene glycol 17 g Oral Daily PRN Nora Hinojosa PA-C    senna 1 tablet Oral BID Nora Hinojosa PA-C    thiamine 100 mg Oral Daily Nora Hinojosa PA-C    traMADol 50 mg Oral Q6H PRN Aidan Murray PA-C         Today, Patient Was Seen By: Panda López MD    ** Please Note: Dragon 360 Dictation voice to text software may have been used in the creation of this document   **

## 2018-09-02 NOTE — ASSESSMENT & PLAN NOTE
· Presented with intractable lower back pain, also with pain into his left foot and new foot drop after doing significant exertion in his yard and going on a prolonged plane ride  · History of lower back pain and surgery in [place] 5 years ago for bulging disc at L5  · Patient reports pain today is slowly improving on current regimen  No NSAIDS secondary to prior YONATAN which has since resolved  · LSO brace recommended per Neurosurgery  · CT LS spine showed L5S1 disc herniation  · MRI LS spine showed"    rind of abnormal enhancement surrounding the L5-S1 disc space primarily anteriorly and laterally  Subtle fluid signal within the L5-S1 disc also noted  The possibility of discitis cannot be excluded at this level " ESR and CRP were elevated, consistent with discitis  · Initially planned for IR biopsy but this was not needed given positive blood cultures  · PT evaluation done and recommended for acute inpatient rehab at discharge    Patient is from [place] but is willing to do short term acute rehab locally prior to return to [place]

## 2018-09-02 NOTE — CASE MANAGEMENT
Continued Stay Review    Date: 9/2/2018    Vital Signs: /79   Pulse 71   Temp 98 2 °F (36 8 °C)   Resp 18   Wt 75 5 kg (166 lb 7 2 oz)   SpO2 97%     Medications:   Scheduled Meds:   Current Facility-Administered Medications:  cefazolin 2,000 mg Intravenous Q8H Last Rate: 2,000 mg (09/02/18 1406)   docusate sodium 100 mg Oral BID    folic acid 1 mg Oral Daily    levothyroxine 50 mcg Oral Early Morning    lidocaine 2 patch Transdermal Daily    melatonin 6 mg Oral HS    menthol-methyl salicylate  Apply externally TID    methocarbamol 750 mg Oral Q6H [address]    morphine injection 2 mg Intravenous Q4H PRN    ondansetron 4 mg Intravenous Q6H PRN    oxyCODONE-acetaminophen 2 tablet Oral Q4H PRN    pantoprazole 40 mg Oral Early Morning    polyethylene glycol 17 g Oral Daily PRN    senna 1 tablet Oral BID    thiamine 100 mg Oral Daily    traMADol 50 mg Oral Q6H PRN      Continuous Infusions:    PRN Meds: morphine injection 2 mg iv - used x 1 thus far    oxyCODONE-acetaminophen - used x 2 thus far  Abnormal Labs/Diagnostic Results:   Wbc 6 02, hgb 11 2, hct 32 8  Blood culture [[id number]] Collected: 08/30/18 0847   Lab Status: Preliminary result Specimen: Blood from Arm, Right Updated: 09/02/18 1301    Blood Culture No Growth at 72 hrs  Blood culture [[id number]] Collected: 08/30/18 0847   Lab Status: Preliminary result Specimen: Blood from Arm, Right Updated: 09/02/18 1301    Blood Culture No Growth at 72 hrs     Blood culture [[id number]] (Abnormal) Collected: 08/28/18 1501   Lab Status: Final result Specimen: Blood from Hand, Left Updated: 08/31/18 0956    Blood Culture Beta Hemolytic Streptococcus Group B (A)    Comment: see related culture for Identification and/or Susceptibilitiy       Gram Stain Result Gram positive cocci in chains   Blood culture [[id number]] (Abnormal)  Collected: 08/28/18 1358   Lab Status: Final result Specimen: Blood from Arm, Left Updated: 08/30/18 4281    Blood Culture Beta Hemolytic Streptococcus Group B (A)    Gram Stain Result Gram positive cocci in chains       Age/Sex: 60 y o  male     Assessment/Plan:   Discitis of lumbosacral region   Assessment & Plan     · Presented with intractable lower back pain, also with pain into his left foot and new foot drop after doing significant exertion in his yard and going on a prolonged plane ride  · History of lower back pain and surgery in [facility] 5 years ago for bulging disc at L5  · Patient reports pain today is slowly improving on current regimen  No NSAIDS secondary to prior YONATAN which has since resolved  · LSO brace recommended per Neurosurgery  · CT LS spine showed L5S1 disc herniation  · MRI LS spine showed"    rind of abnormal enhancement surrounding the L5-S1 disc space primarily anteriorly and laterally  Subtle fluid signal within the L5-S1 disc also noted   The possibility of discitis cannot be excluded at this level " ESR and CRP were elevated, consistent with discitis  · Initially planned for IR biopsy but this was not needed given positive blood cultures  · PT evaluation done and recommended for acute inpatient rehab at discharge  Patient is from [place] but is willing to do short term acute rehab locally prior to return to [place]       Bacteremia due to group B Streptococcus   Assessment & Plan     · Repeat blood cultures negative x 72 hours  · Overall clinically improved but will need long-term IV antibiotics  · On high-dose cephazolin 2 g IV q 8 hours  · IR consulted for PICC line placement  Consent obtained and placed in chart       Foot pain, left   Assessment & Plan     · Status post podiatry evaluation  Suspected possible gout  · MRI noted, no osteo  · Currently stable  Avoid NSAIDS secondary to recent YONATAN       Elevated LFTs   Assessment & Plan     · Patient reports known h/o mildly elevated LFTs which has been attributed to his alcohol use    Probable underlying cirrhosis  · Denies abdominal pain  · Continue to monitor       Thrombocytopenia (HCC)   Assessment & Plan     · Improved at 84 this morning  · No signs of bleeding       Alcohol abuse   Assessment & Plan     · Last drink prior to coming to [place]  · In 12 step program but has not remained consistently sober  · Continue thiamine and folic acid  · No symptoms of withdrawal currently  · Continue nutritional supplements       Hypokalemiaresolved as of 9/2/2018   Assessment & Plan     · Resolved             Discharge Plan: PT recommends short term skilled PT            Thank you,  [address] [place], [place] [address]  Utilization Review Department  Phone: [phone]; Fax [phone]  ATTENTION: Please call with any questions or concerns to [phone]  and carefully follow the prompts so that you are directed to the right person  Send all requests for admission clinical reviews, approved or denied determinations and any other requests to fax [phone]   All voicemails are confidential

## 2018-09-02 NOTE — ASSESSMENT & PLAN NOTE
· Last drink prior to coming to [place]  · In 12 step program but has not remained consistently sober  · Continue thiamine and folic acid  · No symptoms of withdrawal currently  · Continue nutritional supplements

## 2018-09-02 NOTE — ASSESSMENT & PLAN NOTE
· Repeat blood cultures negative x 72 hours  · Overall clinically improved but will need long-term IV antibiotics  · On high-dose cephazolin 2 g IV q 8 hours  · IR consulted for PICC line placement    Consent obtained and placed in chart

## 2018-09-03 PROCEDURE — 99232 SBSQ HOSP IP/OBS MODERATE 35: CPT | Performed by: PHYSICIAN ASSISTANT

## 2018-09-03 PROCEDURE — 97110 THERAPEUTIC EXERCISES: CPT

## 2018-09-03 PROCEDURE — 97116 GAIT TRAINING THERAPY: CPT

## 2018-09-03 PROCEDURE — 99232 SBSQ HOSP IP/OBS MODERATE 35: CPT | Performed by: INTERNAL MEDICINE

## 2018-09-03 RX ORDER — DIPHENHYDRAMINE HYDROCHLORIDE, ZINC ACETATE 2; .1 G/100G; G/100G
CREAM TOPICAL 3 TIMES DAILY PRN
Status: DISCONTINUED | OUTPATIENT
Start: 2018-09-03 | End: 2018-09-06 | Stop reason: HOSPADM

## 2018-09-03 RX ADMIN — OXYCODONE HYDROCHLORIDE AND ACETAMINOPHEN 2 TABLET: 5; 325 TABLET ORAL at 00:36

## 2018-09-03 RX ADMIN — DOCUSATE SODIUM 100 MG: 100 CAPSULE, LIQUID FILLED ORAL at 08:28

## 2018-09-03 RX ADMIN — Medication 100 MG: at 08:28

## 2018-09-03 RX ADMIN — OXYCODONE HYDROCHLORIDE AND ACETAMINOPHEN 2 TABLET: 5; 325 TABLET ORAL at 12:36

## 2018-09-03 RX ADMIN — CEFAZOLIN SODIUM 2000 MG: 2 SOLUTION INTRAVENOUS at 05:22

## 2018-09-03 RX ADMIN — OXYCODONE HYDROCHLORIDE AND ACETAMINOPHEN 2 TABLET: 5; 325 TABLET ORAL at 20:48

## 2018-09-03 RX ADMIN — CEFAZOLIN SODIUM 2000 MG: 2 SOLUTION INTRAVENOUS at 12:46

## 2018-09-03 RX ADMIN — OXYCODONE HYDROCHLORIDE AND ACETAMINOPHEN 2 TABLET: 5; 325 TABLET ORAL at 16:31

## 2018-09-03 RX ADMIN — DOCUSATE SODIUM 100 MG: 100 CAPSULE, LIQUID FILLED ORAL at 17:12

## 2018-09-03 RX ADMIN — METHOCARBAMOL 750 MG: 750 TABLET, FILM COATED ORAL at 00:36

## 2018-09-03 RX ADMIN — CEFAZOLIN SODIUM 2000 MG: 2 SOLUTION INTRAVENOUS at 20:52

## 2018-09-03 RX ADMIN — METHOCARBAMOL 750 MG: 750 TABLET, FILM COATED ORAL at 11:00

## 2018-09-03 RX ADMIN — FOLIC ACID 1 MG: 1 TABLET ORAL at 08:28

## 2018-09-03 RX ADMIN — PANTOPRAZOLE SODIUM 40 MG: 40 TABLET, DELAYED RELEASE ORAL at 05:21

## 2018-09-03 RX ADMIN — METHOCARBAMOL 750 MG: 750 TABLET, FILM COATED ORAL at 05:21

## 2018-09-03 RX ADMIN — LEVOTHYROXINE SODIUM 50 MCG: 50 TABLET ORAL at 05:21

## 2018-09-03 RX ADMIN — MELATONIN TAB 3 MG 6 MG: 3 TAB at 22:02

## 2018-09-03 RX ADMIN — OXYCODONE HYDROCHLORIDE AND ACETAMINOPHEN 2 TABLET: 5; 325 TABLET ORAL at 08:28

## 2018-09-03 RX ADMIN — SENNOSIDES 8.6 MG: 8.6 TABLET, FILM COATED ORAL at 08:28

## 2018-09-03 RX ADMIN — METHOCARBAMOL 750 MG: 750 TABLET, FILM COATED ORAL at 17:12

## 2018-09-03 RX ADMIN — SENNOSIDES 8.6 MG: 8.6 TABLET, FILM COATED ORAL at 17:12

## 2018-09-03 NOTE — ASSESSMENT & PLAN NOTE
· Status post podiatry evaluation  Suspected possible gout  · MRI noted, no osteo  · Currently stable, says it will be better when he has his walking will be better when he has his sneakers from home    Avoid NSAIDS secondary to recent YONATAN

## 2018-09-03 NOTE — PROGRESS NOTES
Progress Note - Infectious Disease   Stephany Staples 60 y o  male MRN: [id number]  Unit/Bed#: -01 Encounter: [id number]    Impression/Recommendations:  1   Group B strep bacteremia   Blood cultures checked yesterday are both positive for group B strep   Likely the explanation for abnormal MRI findings   TTE is negative for endocarditis   Fortunately, patient remains clinically well, nontoxic  Patient is tolerating IV cefazolin without difficulty  Repeat blood cultures remain negative      -continue high-dose cefazolin 2 g IV q 8 hours  Plan for 6 week course, tentatively through 10/9   - needs weekly CBC with diff, BMP while on IV antibiotic  -okay to place PICC line as repeat blood cultures are negative at 72 hr   - discontinue PICC line after last dose of IV antibiotic  - follow-up with infectious diseases as outpatient      2   Abnormal MRI lumbar spine   Patient presented with acute low back pain   X-ray and CT more suggestive of degenerative changes   MRI showing enhancement at L5-S1 disc space, diskitis cannot be excluded   ESR elevated at 64, CRP greater than 90   Patient with no associated symptoms such as fevers or chills   No leukocytosis  Now patient has group B strep in blood which can be the explanation for diskitis      -antibiotic plan as above  -will require prolonged course of IV antibiotic of 6 weeks  -neurosurgery follow-up ongoing     3   Acute low back pain   Likely related to #1   MRI does show mild bulge at L4   Also moderate narrowing at L4-5 and L5-S1   This may be the cause of back pain   However, there was also enhancement noted at L5-S1 and cannot rule out diskitis        -plan as above  -pain management per primary team  -monitor back pain in neuro exam     4   Abnormal LFTs   As per patient, this is not a new finding   May be related to his history of extensive alcohol abuse   Recommend outpatient workup and monitoring  Will avoid long-term ceftriaxone      5   Thrombocytopenia   Platelets on low side but remains stable   This may also be related to extensive alcohol abuse history   Patient is not from this area so has not had any prior imaging done here   No associated fevers, chills to suggest acute infectious process      6   Lumbar disc herniation with spinal stenosis   With history L5 laminectomy about 5 years ago      7   Left plantar foot pain   Patient states he was treated for left foot cellulitis with oral Keflex last month   He still has mild left plantar foot pain   No obvious evidence of cellulitis on exam   This may be the explanation for group B strep bacteremia  MRI of the foot with no clear evidence of osteomyelitis   It does show some erosive changes at the 1st proximal phalanx and base of 4th metatarsal  Which may be related to underlying gout   Also small joint effusion at the 1st MTP joint  Podiatry evaluation noted with no indication for intervention at this time      Antibiotics:  Cefazolin D6    Patient will be going to acute inpatient rehab on discharge        Subjective:    Patient is ambulating with physical therapy down the halls  His pain is better controlled  Denies fevers, chills, or sweats  Denies nausea, vomiting, or diarrhea  Objective:  Vitals:  HR:  [70-74] 70  Resp:  [16-18] 16  BP: (114-165)/(55-81) 165/81  SpO2:  [94 %-96 %] 96 %  Temp (24hrs), Av 4 °F (36 9 °C), Min:98 2 °F (36 8 °C), Max:98 5 °F (36 9 °C)  Current: Temperature: 98 2 °F (36 8 °C)    Physical Exam:   General:  No acute distress  Psychiatric:  Awake and alert  Pulmonary:  Normal respiratory excursion without accessory muscle use  Abdomen:  Soft, nontender  Extremities:  No edema  Skin:  No rashes    Pinpoint lumbar spinal tenderness    Lab Results:  I have personally reviewed pertinent labs      Results from last 7 days  Lab Units 18  0552 18  0515 18  0507   SODIUM mmol/L 137 136 136   POTASSIUM mmol/L 3 8 3 2* 3 5   CHLORIDE mmol/L 100 100 104   CO2 mmol/L 32 28 22   BUN mg/dL 9 8 10   CREATININE mg/dL 0 83 0 80 0 92   EGFR ml/min/1 73sq m 96 98 91   CALCIUM mg/dL 8 8 8 6 8 3   AST U/L  --  51* 60*   ALT U/L  --  35 40   ALK PHOS U/L  --  191* 160*       Results from last 7 days  Lab Units 09/02/18  0731 08/30/18  0507 08/28/18  1358   WBC Thousand/uL 6 02 7 38 5 96   HEMOGLOBIN g/dL 11 2* 10 9* 12 3   PLATELETS Thousands/uL 84* 46* 52*       Results from last 7 days  Lab Units 08/30/18  0847 08/28/18  1501 08/28/18  1358   BLOOD CULTURE  No Growth After 4 Days  No Growth After 4 Days  Beta Hemolytic Streptococcus Group B* Beta Hemolytic Streptococcus Group B*   GRAM STAIN RESULT   --  Gram positive cocci in chains Gram positive cocci in chains       Imaging Studies:   I have personally reviewed pertinent imaging study reports and images in PACS  EKG, Pathology, and Other Studies:   I have personally reviewed pertinent reports

## 2018-09-03 NOTE — PLAN OF CARE
Problem: PHYSICAL THERAPY ADULT  Goal: Performs mobility at highest level of function for planned discharge setting  See evaluation for individualized goals  Treatment/Interventions: LE strengthening/ROM, Therapeutic exercise, Cognitive reorientation, Equipment eval/education, Bed mobility, Patient/family training  Equipment Recommended: Other (Comment) (pt needs dependent means for out of bed mobilization )       See flowsheet documentation for full assessment, interventions and recommendations  Outcome: Progressing  Prognosis: Fair  Problem List: Decreased strength, Decreased range of motion, Decreased endurance, Decreased mobility, Pain  Assessment: Patient demonstrated ability to transfer supine to sit with less person assist and cues for log roll technique  Sit to stand transfers consistent with min a x 1 and verbal instructions for safe hand placement and body positioning  Demonstrated ability to ambulate increased gait distance with roller walker  Requires increased amounts of time and assistance with walker managament  Verbal instruction for distance from roller walker, stepping sequence and posture  Patient participated in supine B LE exercise with review of previously provided HEP  Continue to focus on transfer and gait progression with emphasis on safety and technique  Barriers to Discharge: Inaccessible home environment     Recommendation: Short-term skilled PT     PT - OK to Discharge: Yes (to STR when stable )    See flowsheet documentation for full assessment

## 2018-09-03 NOTE — PROGRESS NOTES
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      Plan:  Left foot pain is resolving well and will continue to monitor the progress  Continue supportive care  IV antibiotics per ID  Elevate his foot when sitting or in bed  WBAT  Subjective/Objective   Chief Complaint:   Chief Complaint   Patient presents with    Back Pain     Pt presents to the ED with severe exacerbation of back pain  Pt reports hx of L5 bulging disc  Pt states pain seems similiar  Pt reports potentional over exertion this week from exercise and working with a sledge hammer  Last med was ibuprofen 5 hrs ago  Subjective: 60 y o  y/o male was seen and evaluated at bedside  His pain is much better  It is about 3 out of 10 now  No CP/F/chills/SOB/N/V  Blood pressure 165/81, pulse 70, temperature 98 2 °F (36 8 °C), temperature source Oral, resp  rate 16, weight 75 5 kg (166 lb 7 2 oz), SpO2 96 %  ,There is no height or weight on file to calculate BMI  Invasive Devices     Peripheral Intravenous Line            Long-Dwell Peripheral IV (Midline) 65/96/62 Left Basilic 5 days                Physical Exam:   General: Alert, cooperative and no distress  Lungs: Non labored breathing  Heart: Positive S1, S2  Abdomen: Soft, non-tender  Extremity: Minimal edema LLE  Minimal tenderness around left TNJ  No wound presents left LE  Minimal redness on left LE  Lab, Imaging and other studies:   I have personally reviewed pertinent lab results  Imaging: I have personally reviewed pertinent films in PACS  EKG, Pathology, and Other Studies: I have personally reviewed pertinent reports

## 2018-09-03 NOTE — ASSESSMENT & PLAN NOTE
· Presented with intractable lower back pain, also with pain into his left foot and new foot drop after doing significant exertion in his yard and going on a prolonged plane ride  · History of lower back pain and surgery in [place] 5 years ago for bulging disc at L5  · Patient reports pain today is stable on current regimen of 2 percocet q 4hrs  No NSAIDS secondary to prior YONATAN which has since resolved  · LSO brace recommended per Neurosurgery  · CT LS spine showed L5S1 disc herniation  · MRI LS spine showed "rind of abnormal enhancement surrounding the L5-S1 disc space primarily anteriorly and laterally  Subtle fluid signal within the L5-S1 disc also noted  The possibility of discitis cannot be excluded at this level " ESR and CRP were elevated, consistent with discitis  · Initially planned for IR biopsy but this was not needed given positive blood cultures  · PT evaluation done and recommended for acute inpatient rehab at discharge    Patient is from [place] but is willing to do short term acute rehab locally prior to return to [place]

## 2018-09-03 NOTE — ASSESSMENT & PLAN NOTE
· Repeat blood cultures negative x 72 hours  · Overall clinically improved but will need long-term IV antibiotics  · On high-dose cephazolin 2 g IV q 8 hours  · IR consulted for PICC line placement    Consent obtained and placed in chart--likely to happen tomorrow

## 2018-09-03 NOTE — PROGRESS NOTES
Progress Note - Jeffery Valero 1960, 60 y o  male MRN: [id number]    Unit/Bed#: -01 Encounter: [id number]    Primary Care Provider: No primary care provider on file  Date and time admitted to hospital: 8/25/2018  9:25 PM    * Discitis of lumbosacral region   Assessment & Plan    · Presented with intractable lower back pain, also with pain into his left foot and new foot drop after doing significant exertion in his yard and going on a prolonged plane ride  · History of lower back pain and surgery in [facility] 5 years ago for bulging disc at L5  · Patient reports pain today is stable on current regimen of 2 percocet q 4hrs  No NSAIDS secondary to prior YONATAN which has since resolved  · LSO brace recommended per Neurosurgery  · CT LS spine showed L5S1 disc herniation  · MRI LS spine showed "rind of abnormal enhancement surrounding the L5-S1 disc space primarily anteriorly and laterally  Subtle fluid signal within the L5-S1 disc also noted  The possibility of discitis cannot be excluded at this level " ESR and CRP were elevated, consistent with discitis  · Initially planned for IR biopsy but this was not needed given positive blood cultures  · PT evaluation done and recommended for acute inpatient rehab at discharge  Patient is from [place] but is willing to do short term acute rehab locally prior to return to [place]        Bacteremia due to group B Streptococcus   Assessment & Plan    · Repeat blood cultures negative x 72 hours  · Overall clinically improved but will need long-term IV antibiotics  · On high-dose cephazolin 2 g IV q 8 hours  · IR consulted for PICC line placement  Consent obtained and placed in chart--likely to happen tomorrow        Foot pain, left   Assessment & Plan    · Status post podiatry evaluation  Suspected possible gout  · MRI noted, no osteo  · Currently stable, says it will be better when he has his walking will be better when he has his sneakers from home    Avoid NSAIDS secondary to recent YONATAN        Alcohol abuse   Assessment & Plan    · Last drink prior to coming to [place]  · In 12 step program but has not remained consistently sober  · Continue thiamine and folic acid  · No symptoms of withdrawal currently  · Continue nutritional supplements  · Monitor LFTS and platelets          VTE Pharmacologic Prophylaxis:   Pharmacologic: Pharmacologic VTE Prophylaxis contraindicated due to low platelets  Mechanical VTE Prophylaxis in Place: No    Patient Centered Rounds: I have performed bedside rounds with nursing staff today  Discussions with Specialists or Other Care Team Provider:     Education and Discussions with Family / Patient:     Time Spent for Care: 20 minutes  More than 50% of total time spent on counseling and coordination of care as described above  Current Length of Stay: 6 day(s) --actually hospital day #10 when obs days included    Current Patient Status: Inpatient   Certification Statement: The patient will continue to require additional inpatient hospital stay due to awaiting PICC and STR    Discharge Plan:  Hopefully discharge tomorrow once PICC line is placed and short-term rehab arrangements have been made    Code Status: Level 1 - Full Code    Subjective:   Patient says that his nurses taking good care of him and keeping his pain under control by giving him Percocet every 4 hours  He denies any constipation  Denies any confusion  He has been up out of bed and ambulating in the bartholomew  His foot continues to bother him but he feels that when he gets his sneakers from home he will be doing better with that  He denies any other new complaints or concerns except that his back is a little itchy  Objective:     Vitals:   Temp (24hrs), Av 4 °F (36 9 °C), Min:98 2 °F (36 8 °C), Max:98 5 °F (36 9 °C)    HR:  [70-74] 70  Resp:  [16-18] 16  BP: (114-165)/(55-81) 165/81  SpO2:  [94 %-96 %] 96 %  There is no height or weight on file to calculate BMI  Input and Output Summary (last 24 hours): Intake/Output Summary (Last 24 hours) at 09/03/18 1244  Last data filed at 09/03/18 1158   Gross per 24 hour   Intake              280 ml   Output             3325 ml   Net            -3045 ml       Physical Exam:     Physical Exam   Constitutional: He appears well-developed and well-nourished  No distress  HENT:   Head: Normocephalic and atraumatic  Eyes: Conjunctivae are normal  Right eye exhibits no discharge  Left eye exhibits no discharge  No scleral icterus  Neck: Neck supple  Cardiovascular: Normal rate, regular rhythm and normal heart sounds  No murmur heard  Pulmonary/Chest: Effort normal and breath sounds normal  No respiratory distress  He has no wheezes  He has no rales  Abdominal: Soft  Bowel sounds are normal  He exhibits no distension  There is no tenderness  Musculoskeletal: He exhibits no edema  Neurological: He is alert  Awake alert interactive no confusion   Skin: Skin is warm and dry  No rash noted  He is not diaphoretic  No erythema  No pallor  Psychiatric: He has a normal mood and affect  His behavior is normal  Thought content normal    Vitals reviewed  Additional Data:     Labs:      Results from last 7 days  Lab Units 09/02/18  0731   WBC Thousand/uL 6 02   HEMOGLOBIN g/dL 11 2*   HEMATOCRIT % 32 8*   PLATELETS Thousands/uL 84*   NEUTROS PCT % 64   LYMPHS PCT % 19   MONOS PCT % 14*   EOS PCT % 1       Results from last 7 days  Lab Units 09/01/18  0552 08/31/18  0515   SODIUM mmol/L 137 136   POTASSIUM mmol/L 3 8 3 2*   CHLORIDE mmol/L 100 100   CO2 mmol/L 32 28   BUN mg/dL 9 8   CREATININE mg/dL 0 83 0 80   CALCIUM mg/dL 8 8 8 6   ALK PHOS U/L  --  191*   ALT U/L  --  35   AST U/L  --  51*                   * I Have Reviewed All Lab Data Listed Above    * Additional Pertinent Lab Tests Reviewed: No New Labs Available For Today    Imaging:    Imaging Reports Reviewed Today Include:   Imaging Personally Reviewed by Myself Includes:      Recent Cultures (last 7 days):       Results from last 7 days  Lab Units 08/30/18  0847 08/28/18  1501 08/28/18  1358   BLOOD CULTURE  No Growth at 72 hrs  No Growth at 72 hrs  Beta Hemolytic Streptococcus Group B* Beta Hemolytic Streptococcus Group B*   GRAM STAIN RESULT   --  Gram positive cocci in chains Gram positive cocci in chains       Last 24 Hours Medication List:     Current Facility-Administered Medications:  cefazolin 2,000 mg Intravenous Q8H Nora Hinojosa PA-C Last Rate: 2,000 mg (09/03/18 0522)   docusate sodium 100 mg Oral BID Nora Hinojosa PA-C    folic acid 1 mg Oral Daily Nora Hinojosa PA-C    levothyroxine 50 mcg Oral Early Morning Idania OwensKIRAN    lidocaine 2 patch Transdermal Daily Nora Hinojosa PA-C    melatonin 6 mg Oral HS Nora Hinojosa PA-C    menthol-methyl salicylate  Apply externally TID Nora Hinojosa PA-C    methocarbamol 750 mg Oral Q6H [facility] Nora Hinojosa PA-C    morphine injection 2 mg Intravenous Q4H PRN Yamilet Marquez MD    ondansetron 4 mg Intravenous Q6H PRN Abida Lauren PA-C    oxyCODONE-acetaminophen 2 tablet Oral Q4H PRN Nora Hinojosa PA-C    pantoprazole 40 mg Oral Early Morning Yamilet Marquez MD    polyethylene glycol 17 g Oral Daily PRN Nora Hinojosa PA-C    senna 1 tablet Oral BID Nora Hinojosa PA-C    thiamine 100 mg Oral Daily Nora Hinojosa, KIRAN    traMADol 50 mg Oral Q6H PRN Abida Lauren PA-C         Today, Patient Was Seen By: Ej Batista PA-C    ** Please Note: Dictation voice to text software may have been used in the creation of this document   **

## 2018-09-03 NOTE — ASSESSMENT & PLAN NOTE
· Last drink prior to coming to [facility]  · In 12 step program but has not remained consistently sober  · Continue thiamine and folic acid  · No symptoms of withdrawal currently  · Continue nutritional supplements  · Monitor LFTS and platelets

## 2018-09-03 NOTE — PHYSICAL THERAPY NOTE
PHYSICAL THERAPY NOTE      Patient Name: Cole Jewell  EWJNP'E Date: 9/3/2018        09/03/18 1312   Pain Assessment   Pain Assessment 0-10   Pain Score 6   Pain Type Acute pain   Pain Location Back   Pain Orientation Bilateral;Lower   Restrictions/Precautions   Weight Bearing Precautions Per Order No   Braces or Orthoses LSO  (when OOB for comfort)   Other Precautions Fall Risk;Pain;Bed Alarm   General   Family/Caregiver Present No   Subjective   Subjective Patient supine in bed and is agreeable to therapy session  Patient identifers obtained from name &   Bed Mobility   Rolling L 5  Supervision   Additional items Assist x 1;Bedrails; Increased time required;Verbal cues;LE management   Supine to Sit 4  Minimal assistance   Additional items Assist x 1;HOB elevated; Bedrails; Increased time required;Verbal cues;LE management   Sit to Supine 4  Minimal assistance   Additional items Assist x 1; Increased time required;Verbal cues; Bedrails   Additional Comments Patient supine in bed with call bell and belongings in reach  Transfers   Sit to Stand 4  Minimal assistance   Additional items Assist x 1; Armrests; Increased time required;Verbal cues  (hand placement)   Stand to Sit 4  Minimal assistance   Additional items Assist x 1; Armrests; Increased time required;Verbal cues   Ambulation/Elevation   Gait pattern Antalgic;Decreased foot clearance;L Foot drag;Excessively slow; Step to; Improper Weight shift   Gait Assistance 4  Minimal assist   Additional items Assist x 1;Verbal cues   Assistive Device Rolling walker   Distance 125' x1   Balance   Static Sitting Fair   Dynamic Sitting Fair   Static Standing Poor +   Dynamic Standing Poor   Ambulatory Poor   Endurance Deficit   Endurance Deficit Yes   Endurance Deficit Description limited activity tolerance, increase pain   Activity Tolerance   Activity Tolerance Patient limited by fatigue;Patient limited by pain   Nurse Made Aware Spoke to Yuliya Garza RN    Exercises   [employer] Supine;10 reps;AROM; Bilateral   Heelslides Supine;10 reps;AROM; Bilateral   Hip Abduction Supine;10 reps;AROM; Bilateral   Ankle Pumps Supine;10 reps;AROM; Bilateral   Assessment   Prognosis Fair   Problem List Decreased strength;Decreased range of motion;Decreased endurance;Decreased mobility;Pain   Assessment Patient demonstrated ability to transfer supine to sit with less person assist and cues for log roll technique  Sit to stand transfers consistent with min a x 1 and verbal instructions for safe hand placement and body positioning  Demonstrated ability to ambulate increased gait distance with roller walker  Requires increased amounts of time and assistance with walker managament  Verbal instruction for distance from roller walker, stepping sequence and posture  Patient participated in supine B LE exercise with review of previously provided HEP  Continue to focus on transfer and gait progression with emphasis on safety and technique  Barriers to Discharge Inaccessible home environment   Goals   Patient Goals to walk more   STG Expiration Date 09/07/18   Treatment Day 4   Plan   Treatment/Interventions LE strengthening/ROM; Therapeutic exercise;Cognitive reorientation;Equipment eval/education; Bed mobility; Patient/family training   Progress Slow progress, decreased activity tolerance   PT Frequency 5x/wk   Recommendation   Recommendation Short-term skilled PT       Moni Cheng, NORMA

## 2018-09-03 NOTE — CASE MANAGEMENT
Continued Stay Review    Date: 9/3/2018    Vital Signs: /81 (BP Location: Right arm)   Pulse 70   Temp 98 2 °F (36 8 °C) (Oral)   Resp 16   Wt 75 5 kg (166 lb 7 2 oz)   SpO2 96%     Medications:   Scheduled Meds:   Current Facility-Administered Medications:  cefazolin 2,000 mg Intravenous Q8H Last Rate: 2,000 mg (09/03/18 1246)   diphenhydrAMINE-zinc acetate  Topical TID PRN    docusate sodium 100 mg Oral BID    folic acid 1 mg Oral Daily    levothyroxine 50 mcg Oral Early Morning    lidocaine 2 patch Transdermal Daily    melatonin 6 mg Oral HS    menthol-methyl salicylate  Apply externally TID    methocarbamol 750 mg Oral Q6H [facility]    morphine injection 2 mg Intravenous Q4H PRN    ondansetron 4 mg Intravenous Q6H PRN    oxyCODONE-acetaminophen 2 tablet Oral Q4H PRN    pantoprazole 40 mg Oral Early Morning    polyethylene glycol 17 g Oral Daily PRN    senna 1 tablet Oral BID    thiamine 100 mg Oral Daily    traMADol 50 mg Oral Q6H PRN      Continuous Infusions:    PRN Meds:    oxyCODONE-acetaminophen 2 tabs - used x 3 thus far  Abnormal Labs/Diagnostic Results:   Blood culture [[id number]] Collected: 08/30/18 0847   Lab Status: Preliminary result Specimen: Blood from Arm, Right Updated: 09/03/18 1301    Blood Culture No Growth After 4 Days  Blood culture [[id number]] Collected: 08/30/18 0847   Lab Status: Preliminary result Specimen: Blood from Arm, Right Updated: 09/03/18 1301    Blood Culture No Growth After 4 Days  Age/Sex: 60 y o  male     Assessment/Plan:   Discitis of lumbosacral region   Assessment & Plan     · Presented with intractable lower back pain, also with pain into his left foot and new foot drop after doing significant exertion in his yard and going on a prolonged plane ride  · History of lower back pain and surgery in [place] 5 years ago for bulging disc at L5  · Patient reports pain today is stable on current regimen of 2 percocet q 4hrs   No NSAIDS secondary to prior YONATAN which has since resolved  · LSO brace recommended per Neurosurgery  · CT LS spine showed L5S1 disc herniation  · MRI LS spine showed "rind of abnormal enhancement surrounding the L5-S1 disc space primarily anteriorly and laterally  Subtle fluid signal within the L5-S1 disc also noted   The possibility of discitis cannot be excluded at this level " ESR and CRP were elevated, consistent with discitis  · Initially planned for IR biopsy but this was not needed given positive blood cultures  · PT evaluation done and recommended for acute inpatient rehab at discharge  Patient is from [place] but is willing to do short term acute rehab locally prior to return to [place]          Bacteremia due to group B Streptococcus   Assessment & Plan     · Repeat blood cultures negative x 72 hours  · Overall clinically improved but will need long-term IV antibiotics  · On high-dose cephazolin 2 g IV q 8 hours  · IR consulted for PICC line placement  Consent obtained and placed in chart--likely to happen tomorrow          Foot pain, left   Assessment & Plan     · Status post podiatry evaluation  Suspected possible gout  · MRI noted, no osteo  · Currently stable, says it will be better when he has his walking will be better when he has his sneakers from home  Avoid NSAIDS secondary to recent YONATAN          Alcohol abuse   Assessment & Plan     · Last drink prior to coming to [place]  · In 12 step program but has not remained consistently sober  · Continue thiamine and folic acid  · No symptoms of withdrawal currently  · Continue nutritional supplements  · Monitor LFTS and platelets         Discharge Plan: short term rehab        Thank you,  [address]  Utilization Review Department  Phone: [phone]; Fax [phone]  ATTENTION: Please call with any questions or concerns to [phone]  and carefully follow the prompts so that you are directed to the right person     Send all requests for admission clinical reviews, approved or denied determinations and any other requests to fax [phone]   All voicemails are confidential

## 2018-09-04 ENCOUNTER — APPOINTMENT (INPATIENT)
Dept: RADIOLOGY | Facility: HOSPITAL | Age: 58
DRG: 872 | End: 2018-09-04
Attending: INTERNAL MEDICINE
Payer: COMMERCIAL

## 2018-09-04 PROBLEM — B95.1 BACTEREMIA DUE TO GROUP B STREPTOCOCCUS: Status: RESOLVED | Noted: 2018-09-01 | Resolved: 2018-09-04

## 2018-09-04 PROBLEM — R78.81 BACTEREMIA DUE TO GROUP B STREPTOCOCCUS: Status: RESOLVED | Noted: 2018-09-01 | Resolved: 2018-09-04

## 2018-09-04 LAB
ANION GAP SERPL CALCULATED.3IONS-SCNC: 7 MMOL/L (ref 4–13)
BACTERIA BLD CULT: NORMAL
BACTERIA BLD CULT: NORMAL
BASOPHILS # BLD AUTO: 0.02 THOUSANDS/ΜL (ref 0–0.1)
BASOPHILS NFR BLD AUTO: 0 % (ref 0–1)
BUN SERPL-MCNC: 12 MG/DL (ref 5–25)
CALCIUM SERPL-MCNC: 9.8 MG/DL (ref 8.3–10.1)
CHLORIDE SERPL-SCNC: 100 MMOL/L (ref 100–108)
CO2 SERPL-SCNC: 29 MMOL/L (ref 21–32)
CREAT SERPL-MCNC: 0.88 MG/DL (ref 0.6–1.3)
EOSINOPHIL # BLD AUTO: 0.16 THOUSAND/ΜL (ref 0–0.61)
EOSINOPHIL NFR BLD AUTO: 3 % (ref 0–6)
ERYTHROCYTE [DISTWIDTH] IN BLOOD BY AUTOMATED COUNT: 13.6 % (ref 11.6–15.1)
GFR SERPL CREATININE-BSD FRML MDRD: 94 ML/MIN/1.73SQ M
GLUCOSE SERPL-MCNC: 114 MG/DL (ref 65–140)
HCT VFR BLD AUTO: 35.7 % (ref 36.5–49.3)
HGB BLD-MCNC: 11.9 G/DL (ref 12–17)
IMM GRANULOCYTES # BLD AUTO: 0.07 THOUSAND/UL (ref 0–0.2)
IMM GRANULOCYTES NFR BLD AUTO: 1 % (ref 0–2)
LYMPHOCYTES # BLD AUTO: 2.37 THOUSANDS/ΜL (ref 0.6–4.47)
LYMPHOCYTES NFR BLD AUTO: 37 % (ref 14–44)
MCH RBC QN AUTO: 32.2 PG (ref 26.8–34.3)
MCHC RBC AUTO-ENTMCNC: 33.3 G/DL (ref 31.4–37.4)
MCV RBC AUTO: 97 FL (ref 82–98)
MONOCYTES # BLD AUTO: 0.83 THOUSAND/ΜL (ref 0.17–1.22)
MONOCYTES NFR BLD AUTO: 13 % (ref 4–12)
NEUTROPHILS # BLD AUTO: 3.01 THOUSANDS/ΜL (ref 1.85–7.62)
NEUTS SEG NFR BLD AUTO: 46 % (ref 43–75)
NRBC BLD AUTO-RTO: 0 /100 WBCS
PLATELET # BLD AUTO: 155 THOUSANDS/UL (ref 149–390)
PMV BLD AUTO: 9.6 FL (ref 8.9–12.7)
POTASSIUM SERPL-SCNC: 4.3 MMOL/L (ref 3.5–5.3)
RBC # BLD AUTO: 3.69 MILLION/UL (ref 3.88–5.62)
SODIUM SERPL-SCNC: 136 MMOL/L (ref 136–145)
WBC # BLD AUTO: 6.46 THOUSAND/UL (ref 4.31–10.16)

## 2018-09-04 PROCEDURE — 77001 FLUOROGUIDE FOR VEIN DEVICE: CPT

## 2018-09-04 PROCEDURE — 76937 US GUIDE VASCULAR ACCESS: CPT

## 2018-09-04 PROCEDURE — 80048 BASIC METABOLIC PNL TOTAL CA: CPT | Performed by: PHYSICIAN ASSISTANT

## 2018-09-04 PROCEDURE — 36569 INSJ PICC 5 YR+ W/O IMAGING: CPT

## 2018-09-04 PROCEDURE — 99232 SBSQ HOSP IP/OBS MODERATE 35: CPT | Performed by: INTERNAL MEDICINE

## 2018-09-04 PROCEDURE — 97535 SELF CARE MNGMENT TRAINING: CPT

## 2018-09-04 PROCEDURE — 97116 GAIT TRAINING THERAPY: CPT

## 2018-09-04 PROCEDURE — 99232 SBSQ HOSP IP/OBS MODERATE 35: CPT | Performed by: PHYSICIAN ASSISTANT

## 2018-09-04 PROCEDURE — C1751 CATH, INF, PER/CENT/MIDLINE: HCPCS

## 2018-09-04 PROCEDURE — 02HV33Z INSERTION OF INFUSION DEVICE INTO SUPERIOR VENA CAVA, PERCUTANEOUS APPROACH: ICD-10-PCS | Performed by: INTERNAL MEDICINE

## 2018-09-04 PROCEDURE — 97530 THERAPEUTIC ACTIVITIES: CPT

## 2018-09-04 PROCEDURE — 85025 COMPLETE CBC W/AUTO DIFF WBC: CPT | Performed by: PHYSICIAN ASSISTANT

## 2018-09-04 RX ORDER — OXYCODONE HYDROCHLORIDE AND ACETAMINOPHEN 5; 325 MG/1; MG/1
2 TABLET ORAL EVERY 4 HOURS PRN
Qty: 30 TABLET | Refills: 0 | Status: CANCELLED | OUTPATIENT
Start: 2018-09-04 | End: 2018-09-14

## 2018-09-04 RX ADMIN — OXYCODONE HYDROCHLORIDE AND ACETAMINOPHEN 2 TABLET: 5; 325 TABLET ORAL at 00:59

## 2018-09-04 RX ADMIN — CEFAZOLIN SODIUM 2000 MG: 2 SOLUTION INTRAVENOUS at 21:02

## 2018-09-04 RX ADMIN — DOCUSATE SODIUM 100 MG: 100 CAPSULE, LIQUID FILLED ORAL at 17:35

## 2018-09-04 RX ADMIN — METHOCARBAMOL 750 MG: 750 TABLET, FILM COATED ORAL at 17:35

## 2018-09-04 RX ADMIN — PANTOPRAZOLE SODIUM 40 MG: 40 TABLET, DELAYED RELEASE ORAL at 05:30

## 2018-09-04 RX ADMIN — MENTHOL, METHYL SALICYLATE: 10; 15 CREAM TOPICAL at 10:17

## 2018-09-04 RX ADMIN — METHOCARBAMOL 750 MG: 750 TABLET, FILM COATED ORAL at 23:43

## 2018-09-04 RX ADMIN — FOLIC ACID 1 MG: 1 TABLET ORAL at 10:16

## 2018-09-04 RX ADMIN — OXYCODONE HYDROCHLORIDE AND ACETAMINOPHEN 2 TABLET: 5; 325 TABLET ORAL at 10:22

## 2018-09-04 RX ADMIN — MELATONIN TAB 3 MG 6 MG: 3 TAB at 21:01

## 2018-09-04 RX ADMIN — SENNOSIDES 8.6 MG: 8.6 TABLET, FILM COATED ORAL at 17:35

## 2018-09-04 RX ADMIN — CEFAZOLIN SODIUM 2000 MG: 2 SOLUTION INTRAVENOUS at 05:28

## 2018-09-04 RX ADMIN — LEVOTHYROXINE SODIUM 50 MCG: 50 TABLET ORAL at 05:29

## 2018-09-04 RX ADMIN — Medication 100 MG: at 10:17

## 2018-09-04 RX ADMIN — CEFAZOLIN SODIUM 2000 MG: 2 SOLUTION INTRAVENOUS at 13:24

## 2018-09-04 RX ADMIN — OXYCODONE HYDROCHLORIDE AND ACETAMINOPHEN 2 TABLET: 5; 325 TABLET ORAL at 15:40

## 2018-09-04 RX ADMIN — METHOCARBAMOL 750 MG: 750 TABLET, FILM COATED ORAL at 00:59

## 2018-09-04 RX ADMIN — METHOCARBAMOL 750 MG: 750 TABLET, FILM COATED ORAL at 11:06

## 2018-09-04 RX ADMIN — METHOCARBAMOL 750 MG: 750 TABLET, FILM COATED ORAL at 05:29

## 2018-09-04 RX ADMIN — SENNOSIDES 8.6 MG: 8.6 TABLET, FILM COATED ORAL at 10:22

## 2018-09-04 RX ADMIN — OXYCODONE HYDROCHLORIDE AND ACETAMINOPHEN 2 TABLET: 5; 325 TABLET ORAL at 21:01

## 2018-09-04 RX ADMIN — OXYCODONE HYDROCHLORIDE AND ACETAMINOPHEN 2 TABLET: 5; 325 TABLET ORAL at 05:35

## 2018-09-04 RX ADMIN — DOCUSATE SODIUM 100 MG: 100 CAPSULE, LIQUID FILLED ORAL at 10:16

## 2018-09-04 NOTE — SOCIAL WORK
Meghan from [facility] spoke with   Insurance Brittany Cotter is pending  Meghan will contact  with insurance decision

## 2018-09-04 NOTE — ASSESSMENT & PLAN NOTE
· Sepsis criteria of new hypotension and YONATAN on 8/29/18-- now resolved after several fluid boluses and albumin; lactic acid normal; procalcitonin elevated at 2 18  · ID following  · Unclear source of bacteremia? ?recent left foot infection  Xray and MRI of foot showed erosion with no osteomyelitis  Podiatry eval appreciated   Patient treated for cellulitis in July with keflex  · Blood cultures 2/2 positive for b hemolytic strep; repeat samples negative  · 2d echo unremarkable  · IV ancef to complete course

## 2018-09-04 NOTE — ASSESSMENT & PLAN NOTE
· Repeat blood cultures negative @ 5 days  · Overall clinically improved but will need long-term IV antibiotics  · On high-dose cephazolin 2 g IV q 8 hours for 6 weeks total through 10/9/18  · PICC line placed  Discontinue after antibiotics completed  · Weekly CBC w/diff, BMP while on antibiotics

## 2018-09-04 NOTE — ASSESSMENT & PLAN NOTE
· Suspect this was due to NSAIDs and hypotension/sepsis  · Creatinine has completely normalized after IV fluids

## 2018-09-04 NOTE — PROCEDURES
PICC Line Insertion  Date/Time: 9/4/2018 10:02 AM  Performed by: Margarito Centeno by: Mark Momin     Patient location:  IR  Consent:     Consent obtained:  Written    Consent given by:  Patient    Risks discussed:  Arterial puncture, bleeding, incorrect placement, infection and nerve damage    Alternatives discussed:  Alternative treatment  Universal protocol:     Procedure explained and questions answered to patient or proxy's satisfaction: yes      Relevant documents present and verified: yes      Test results available and properly labeled: yes      Radiology Images displayed and confirmed  If images not available, report reviewed: yes      Required blood products, implants, devices, and special equipment available: yes      Site/side marked: yes      Immediately prior to procedure, a time out was called: yes      Patient identity confirmed:  Verbally with patient and arm band  Pre-procedure details:     Hand hygiene: Hand hygiene performed prior to insertion      Sterile barrier technique: All elements of maximal sterile technique followed      Skin preparation:  ChloraPrep  Indications:     PICC line indications: long term antibiotics    Anesthesia (see MAR for exact dosages):      Anesthesia method:  Local infiltration    Local anesthetic:  Lidocaine 1% w/o epi  Procedure details:     Location:  Basilic    Vessel type: vein      Laterality:  Right    Approach: percutaneous technique used      Procedural supplies:  Double lumen    Catheter size:  5 Fr    Landmarks identified: yes      Ultrasound guidance: yes      Sterile ultrasound techniques: Sterile gel and sterile probe covers were used      Number of attempts:  1    Successful placement: yes      Total catheter length (cm):  42    Catheter out on skin (cm):  0    Max flow rate:  999ML/HR    Arm circumference:  28CM  Post-procedure details:     Post-procedure:  Dressing applied    Assessment:  Blood return through all ports and placement verified by x-ray (IN IR [place])    Post-procedure complications: none      Patient tolerance of procedure:   Tolerated well, no immediate complications

## 2018-09-04 NOTE — PROGRESS NOTES
Progress Note - Infectious Disease   Brittny Lomax 60 y o  male MRN: [id number]  Unit/Bed#: -01 Encounter: [id number]      Impression/Recommendations:  1   Group B strep bacteremia   Blood cultures checked yesterday are both positive for group B strep   Likely the explanation for abnormal MRI findings   TTE is negative for endocarditis   Fortunately, patient remains clinically well, nontoxic  Patient is tolerating IV cefazolin without difficulty   Repeat blood cultures remain negative      -continue high-dose cefazolin 2 g IV q 8 hours  Plan for 6 week course, tentatively through 10/9   - needs weekly CBC with diff, BMP while on IV antibiotic  -PICC line has been placed  This will need to be discontinued after last dose of IV antibiotic   - follow-up with infectious diseases as outpatient      2   Abnormal MRI lumbar spine   Patient presented with acute low back pain   X-ray and CT more suggestive of degenerative changes   MRI showing enhancement at L5-S1 disc space, diskitis cannot be excluded   ESR elevated at 64, CRP greater than 90   Patient with no associated symptoms such as fevers or chills   No leukocytosis  Now patient has group B strep in blood which can be the explanation for diskitis    ESR is 64      -antibiotic plan as above  -will require prolonged course of IV antibiotic of 6 weeks, as stated above   -trend monthly ESR level   -patient may warrant oral antibiotic after completion of IV antibiotic      3   Acute low back pain   Likely related to #1   MRI does show mild bulge at L4   Also moderate narrowing at L4-5 and L5-S1   This may be the cause of back pain   However, there was also enhancement noted at L5-S1 and cannot rule out diskitis   Back pain is slowly improving      -plan as above  -pain management per primary team  -monitor back pain in neuro exam     4   Abnormal LFTs   As per patient, this is not a new finding   May be related to his history of extensive alcohol abuse   Recommend outpatient workup and monitoring   Will avoid long-term ceftriaxone      5   Thrombocytopenia   Platelets on low side but remains stable   This may also be related to extensive alcohol abuse history   Patient is not from this area so has not had any prior imaging done here   No associated fevers, chills to suggest acute infectious process      6   Lumbar disc herniation with spinal stenosis   With history L5 laminectomy about 5 years ago      7   Left plantar foot pain   Patient states he was treated for left foot cellulitis with oral Keflex last month   He still has mild left plantar foot pain   No obvious evidence of cellulitis on exam   This may be the explanation for group B strep bacteremia  MRI of the foot with no clear evidence of osteomyelitis   It does show some erosive changes at the 1st proximal phalanx and base of 4th metatarsal  Which may be related to underlying gout   Also small joint effusion at the 1st MTP joint   Podiatry evaluation noted with no indication for intervention at this time      Antibiotics:  Cefazolin D7     Stable from ID standpoint  Discussed above plan with patient and his wife at bedside in detail  Patient will be going to acute inpatient rehab on discharge  Subjective:  Pain is better controlled  He is able to ambulate  Denies fevers, chills, or sweats  Denies nausea, vomiting, or diarrhea  Objective:  Vitals:  HR:  [69-75] 69  Resp:  [16-18] 16  BP: (118-149)/(70-87) 118/70  SpO2:  [93 %-94 %] 94 %  Temp (24hrs), Av 4 °F (36 9 °C), Min:98 3 °F (36 8 °C), Max:98 5 °F (36 9 °C)  Current: Temperature: 98 3 °F (36 8 °C)    Physical Exam:   General:  No acute distress  Psychiatric:  Awake and alert  Pulmonary:  Normal respiratory excursion without accessory muscle use  Abdomen:  Soft, nontender  Extremities:  No edema  Skin:  No rashes    Lab Results:  I have personally reviewed pertinent labs      Results from last 7 days  Lab Units 18  0527 18  5756 08/31/18  0515 08/30/18  0507   SODIUM mmol/L 136 137 136 136   POTASSIUM mmol/L 4 3 3 8 3 2* 3 5   CHLORIDE mmol/L 100 100 100 104   CO2 mmol/L 29 32 28 22   BUN mg/dL 12 9 8 10   CREATININE mg/dL 0 88 0 83 0 80 0 92   EGFR ml/min/1 73sq m 94 96 98 91   CALCIUM mg/dL 9 8 8 8 8 6 8 3   AST U/L  --   --  51* 60*   ALT U/L  --   --  35 40   ALK PHOS U/L  --   --  191* 160*       Results from last 7 days  Lab Units 09/04/18  0527 09/02/18  0731 08/30/18  0507   WBC Thousand/uL 6 46 6 02 7 38   HEMOGLOBIN g/dL 11 9* 11 2* 10 9*   PLATELETS Thousands/uL 155 84* 46*       Results from last 7 days  Lab Units 08/30/18  0847   BLOOD CULTURE  No Growth After 5 Days  No Growth After 5 Days  Imaging Studies:   I have personally reviewed pertinent imaging study reports and images in PACS  EKG, Pathology, and Other Studies:   I have personally reviewed pertinent reports

## 2018-09-04 NOTE — CASE MANAGEMENT
Continued Stay Review    Date: 9/4/2018  CC: continued pain, needs short term rehab  Vital Signs: /70 (BP Location: Right arm)   Pulse 69   Temp 98 3 °F (36 8 °C) (Oral)   Resp 16   Wt 75 5 kg (166 lb 7 2 oz)   SpO2 94%     Medications:   Scheduled Meds:   Current Facility-Administered Medications:  cefazolin 2,000 mg Intravenous Q8H Last Rate: 2,000 mg (09/04/18 1324)   diphenhydrAMINE-zinc acetate  Topical TID PRN    docusate sodium 100 mg Oral BID    folic acid 1 mg Oral Daily    levothyroxine 50 mcg Oral Early Morning    lidocaine 2 patch Transdermal Daily    melatonin 6 mg Oral HS    menthol-methyl salicylate  Apply externally TID    methocarbamol 750 mg Oral Q6H [facility]    morphine injection 2 mg Intravenous Q4H PRN    ondansetron 4 mg Intravenous Q6H PRN    oxyCODONE-acetaminophen 2 tablet Oral Q4H PRN    pantoprazole 40 mg Oral Early Morning    polyethylene glycol 17 g Oral Daily PRN    senna 1 tablet Oral BID    thiamine 100 mg Oral Daily    traMADol 50 mg Oral Q6H PRN      Continuous Infusions:    PRN Meds:     oxyCODONE-acetaminophen  2 tabs - used x 3 thus far    Abnormal Labs/Diagnostic Results:   Wbc 6 46, hgb 11 9, hct 35 7  Procedure - PICC line insertion  Blood culture [[id number]] Collected: 08/30/18 0847   Lab Status: Final result Specimen: Blood from Arm, Right Updated: 09/04/18 1303    Blood Culture No Growth After 5 Days  Blood culture [[id number]] Collected: 08/30/18 0847   Lab Status: Final result Specimen: Blood from Arm, Right Updated: 09/04/18 1303    Blood Culture No Growth After 5 Days     Blood culture [[id number]] (Abnormal) Collected: 08/28/18 1501   Lab Status: Final result Specimen: Blood from Hand, Left Updated: 08/31/18 0956    Blood Culture Beta Hemolytic Streptococcus Group B (A)    Comment: see related culture for Identification and/or Susceptibilitiy       Gram Stain Result Gram positive cocci in chains   Blood culture [[id number]] (Abnormal)  Collected: 08/28/18 0067 Lab Status: Final result Specimen: Blood from Arm, Left Updated: 08/30/18 1814    Blood Culture Beta Hemolytic Streptococcus Group B (A)    Gram Stain Result Gram positive cocci in chains       Age/Sex: 60 y o  male     Assessment/Plan:   Discitis of lumbosacral region   Assessment & Plan     · Presented with intractable lower back pain, also with pain into his left foot and new foot drop after doing significant exertion in his yard and going on a prolonged plane ride  · History of lower back pain and surgery in [facility] 5 years ago for bulging disc at L5  · Patient reports pain today is stable on current regimen of 2 percocet q 4hrs  No NSAIDS secondary to prior YONATAN which has since resolved  · LSO brace recommended per Neurosurgery  · CT LS spine showed L5S1 disc herniation  · MRI LS spine showed "rind of abnormal enhancement surrounding the L5-S1 disc space primarily anteriorly and laterally  Subtle fluid signal within the L5-S1 disc also noted   The possibility of discitis cannot be excluded at this level " ESR and CRP were elevated, consistent with discitis  · Initially planned for IR biopsy but this was not needed given positive blood cultures  · PT evaluation done and recommended for acute inpatient rehab at discharge  Patient is from [place] but is willing to do short term acute rehab locally prior to return to [place]          Bacteremia due to group B Streptococcus   Assessment & Plan     · Repeat blood cultures negative x 72 hours  · Overall clinically improved but will need long-term IV antibiotics  · On high-dose cephazolin 2 g IV q 8 hours per ID  · PICC line placed today          YONATAN (acute kidney injury) (HCC)resolved as of 8/30/2018   Assessment & Plan     · Suspect this was due to NSAIDs and hypotension/sepsis  · Creatinine has completely normalized after IV fluids                Foot pain, left   Assessment & Plan     · Status post podiatry evaluation    Suspected possible gout  · MRI noted, no osteo  · Currently stable  Avoid NSAIDS secondary to recent YONATAN          Hyponatremiaresolved as of 8/30/2018   Assessment & Plan     · resolved          Hypokalemiaresolved as of 9/2/2018   Assessment & Plan     · Resolved          Alcohol abuse   Assessment & Plan     · Last drink prior to coming to [place]  · In 12 step program but has not remained consistently sober  · Continue thiamine and folic acid  · No symptoms of withdrawal   · Continue nutritional supplements  · Monitor LFTS and platelets          Elevated LFTs   Assessment & Plan     · Patient reports known h/o mildly elevated LFTs which has been attributed to his alcohol use  Probable underlying cirrhosis  · Denies abdominal pain  · Continue to monitor          Thrombocytopenia ([facility] [address] )   Assessment & Plan     · stable  · No signs of bleeding            Discharge Plan: short term rehab      Thank you,  [facility]  Utilization Review Department  Phone: [phone]; Fax [phone]  ATTENTION: Please call with any questions or concerns to [phone]  and carefully follow the prompts so that you are directed to the right person  Send all requests for admission clinical reviews, approved or denied determinations and any other requests to fax [phone]   All voicemails are confidential

## 2018-09-04 NOTE — ASSESSMENT & PLAN NOTE
· Presented with intractable lower back pain, also with pain into his left foot and new foot drop after doing significant exertion in his yard and going on a prolonged plane ride  · History of lower back pain and surgery in [place] 5 years ago for bulging disc at L5  · Patient reports pain today is stable on current regimen of 2 percocet q 4hrs  No NSAIDS secondary to prior YONATAN which has since resolved  · LSO brace recommended per Neurosurgery  · CT LS spine showed L5S1 disc herniation  · MRI LS spine showed "rind of abnormal enhancement surrounding the L5-S1 disc space primarily anteriorly and laterally  Subtle fluid signal within the L5-S1 disc also noted  The possibility of discitis cannot be excluded at this level " ESR and CRP were elevated, consistent with discitis  · Initially planned for IR biopsy but this was not needed given positive blood cultures  · PT evaluation done and recommended for acute inpatient rehab at discharge    Patient is from [place] but is willing to do short term acute rehab locally prior to return to [place]

## 2018-09-04 NOTE — OCCUPATIONAL THERAPY NOTE
[address] Progress Note     Patient Name: Fady Steiner  Today's Date: 9/4/2018  Problem List  Patient Active Problem List   Diagnosis    Discitis of lumbosacral region    Elevated LFTs    Hyperlipidemia    Hypertension    Hypothyroidism    Thrombocytopenia ([address] )    Alcohol abuse    Foot pain, left    Bacteremia due to group B Streptococcus             09/04/18 1246   Restrictions/Precautions   Weight Bearing Precautions Per Order No   Braces or Orthoses LSO  (When OOB for comfort)   Other Precautions Fall Risk;Pain   General   Response to Previous Treatment Patient with no complaints from previous session   Family/Caregiver Present Yes, spouse   Pain Assessment   Pain Assessment 0-10   Pain Score 5   Pain Type Acute pain   Pain Location Back   Pain Orientation Lower;Right   Pain Descriptors Spasm; Sherryll Neat; Shooting   Pain Frequency Constant/continuous   Pain Onset Ongoing   Clinical Progression (Slowly improving)   Effect of Pain on Daily Activities Limits mobility and activity tolerance/engagement   Patient's Stated Pain Goal No pain   Hospital Pain Intervention(s) Ambulation/increased activity;Repositioned   Response to Interventions Tolerated tx    ADL   Where Assessed Edge of bed   Eating Assistance 6  Modified independent   Eating Deficit Setup   Eating Comments Pt stood and managed beverage after setup   Grooming Assistance 6  Modified Independent   Grooming Deficit Setup   Grooming Comments While seated: pt able to wash face w/ wash cloth  UB Dressing Assistance 4  Minimal Assistance   UB Dressing Deficit Setup;Pull around back; Fasteners   UB Dressing Comments Seated in recliner: pt donned gown, needing min A to pull around back and tie strings   LB Dressing Assistance 4  Minimal Assistance   LB Dressing Deficit Tie shoes;Setup   LB Dressing Comments While in bed: Pt brought knees to chest and sat unsupported from a long sit position to donned sneakers in bed   Pt needed total A to tie shoes   Toileting Assistance  5  Supervision/Setup   Toileting Deficit Verbal cueing; Increased time to complete  (Commode over toilet)   Toileting Comments Pt able to complete toileting w/ no physical A  Functional Standing Tolerance   Time ~10 mins total   Activity Functional mobility and transfers   Comments Pt able to maintain fair - standing balance during mobility and transfers   Bed Mobility   Rolling R 6  Modified independent   Additional items Bedrails; Increased time required   Supine to Sit 6  Modified independent   Additional items Bedrails; Increased time required   Additional Comments Pt completed w/ no physical A and benefited from + time  Transfers   Sit to Stand 4  Minimal assistance   Additional items Assist x 1; Increased time required;Verbal cues   Stand to Sit 4  Minimal assistance   Additional items Assist x 1; Increased time required;Armrests; Verbal cues   Toilet transfer 4  Minimal assistance   Additional items Assist x 1;Commode;Armrests; Increased time required;Verbal cues  (Commode over toilet)   Additional Comments Transfers completed w/ RW  VC needed to reinforce proper hand placement  Pt benefited from + time   Functional Mobility   Functional Mobility 4  Minimal assistance   Additional Comments Pt able to navigate in and out of room w/ min A  Min A necessary for space negotiation and significant breaks needed to accommodate pain  VC given to encourage appropriate posture while walking  Pt completed w/ RW and chair follow  Additional items Rolling walker   Toilet Transfers   Toilet Transfer From [employer] Transfer Type To   Toilet Transfer to Drop-arm commode  (Over toilet in bathroom)   Toilet Transfer Technique Ambulating   Toilet Transfers Minimal assistance   Toilet Transfers Comments Pt benefited from min A to get on/off toilet + time + VC for hand placement  Pt utilized RW     Coordination   Gross Motor Functional    Fine Motor Functional    Dexterity Functional; Maintained  on beverage, RW, rag, and armrests   Cognition   Overall Cognitive Status [facility]   Arousal/Participation Alert; Responsive; Cooperative   Attention Within functional limits   Orientation Level Oriented X4   Memory Within functional limits   Following Commands Follows one step commands with increased time or repetition   Comments Pt identified by full name and wristband  Pt able to engage and carry conversation  Pt remembered therapy student and therapist  Pt able to recall from [facility] and [facility], giving details about the weekend  Pt was cooperative though session  Additional Activities   Additional Activities Other (Comment)   Additional Activities Comments LSO brace education  Pt don/doff LSO brace during session  Donned at EOB after setup w/ S and doffed standing at recliner w/ S  Pt educated on wearing schedule and encouraged to take off once seated instead of when standing  Activity Tolerance   Activity Tolerance Patient limited by pain; Patient tolerated treatment well   Medical Staff Made Aware SANDRA Roche, OT Denia   Assessment   Assessment Patient seen for OT tx session day 1  Pt was agreeable to working with OT upon session  Pt reported to have slightly less pain and is concerned w/ "getting ready for therapy"  During the tx session, pt completed bed mobility, functional transfers/mobility, toileting, UB dressing, LB dressing, grooming, eating and LSO brace education  During functional mobility/transfers, pt benefited from + time and usage of RW  Pt required min A x 1 and chair follow during mobility  VC needed for proper hand placement  Toileting was completed w/ S + time  UB/LB dressing required min A  Pt able to don shoes in bed but needed total A to tie shoes  A needed to don gown, but only to tie strings and pull around back  Pt completed grooming 2* setup as well as beverage mgmt  Tx session was tolerated well but limited to pain   Pt demonstrated necessary reinforcement for LSO brace compliance and benefited from education to don/doff brace, pt still experiencing significant pain  Recommendation stands for post acute rehab  Will continue to follow  Plan   Treatment Interventions ADL retraining;Functional transfer training; Endurance training;Equipment evaluation/education;Patient/family training   Goal Expiration Date 09/08/18   Treatment Day 1   OT Frequency 3-5x/wk   Recommendation   OT Discharge Recommendation (Post Acute Rehab)   Equipment Recommended Bedside commode;Tub seat with back   OT - OK to Discharge (When medically stable)   Barthel Index   Feeding 10   Bathing 0   Grooming Score 5   Dressing Score 5   Bladder Score 10   Bowels Score 10   Toilet Use Score 5   Transfers (Bed/Chair) Score 10   Mobility (Level Surface) Score 0   Stairs Score 0   Barthel Index Score 55   Modified Maricao Scale   Modified Maricao Scale 4     Onel Roa , OTS

## 2018-09-04 NOTE — ASSESSMENT & PLAN NOTE
· Patient reports known h/o mildly elevated LFTs which has been attributed to his alcohol use  Probable underlying cirrhosis  · Denies abdominal pain  · Continue to monitor  · Discontinue Tylenol component of Percocet and maintain patient only on oxycodone

## 2018-09-04 NOTE — ASSESSMENT & PLAN NOTE
· Status post podiatry evaluation  Suspected possible gout  · MRI noted, no osteo  · Currently stable  Avoid NSAIDS secondary to recent YONATAN and tylenol secondary to elevated LFTs

## 2018-09-04 NOTE — ASSESSMENT & PLAN NOTE
· Transient mild YONATAN to 1 5-Suspect this was due to NSAIDs and hypotension/sepsis  · Creatinine has completely normalized after IV fluids      · I have not resumed his Norvasc as his blood pressures have remained stable without it

## 2018-09-04 NOTE — ASSESSMENT & PLAN NOTE
· Last drink prior to coming to [place]  · In 12 step program but has not remained consistently sober  · Continue thiamine and folic acid  · No symptoms of withdrawal   · Continue nutritional supplements  · Monitor LFTS and platelets

## 2018-09-04 NOTE — ASSESSMENT & PLAN NOTE
· Presented with intractable lower back pain, also with pain into his left foot and new foot drop after doing significant exertion in his yard and going on a prolonged plane ride  · History of lower back pain and surgery in [facility] 5 years ago for bulging disc at L5  During the course of this hospitalization I updated his neurosurgeon and internist  · Change percocet to oxycodone 10-15 mg every 4 hours PRN moderate-severe pain given LFTs  · LSO brace recommended per Neurosurgery  · CT LS spine on admission showed L5S1 disc herniation  · MRI LS spine showed "rind of abnormal enhancement surrounding the L5-S1 disc space primarily anteriorly and laterally  Subtle fluid signal within the L5-S1 disc also noted  The possibility of discitis cannot be excluded at this level " ESR and CRP were elevated, consistent with discitis  · Initially planned for IR biopsy but this was not needed given positive blood cultures  · PT evaluation done and recommended for acute inpatient rehab at discharge    Patient is from [place] but is willing to do short term acute rehab locally for 2-3 weeks and then his wife will continue to provide him care locally at their house in the [place] [address] prior to return to [place]

## 2018-09-04 NOTE — PLAN OF CARE
Problem: PHYSICAL THERAPY ADULT  Goal: Performs mobility at highest level of function for planned discharge setting  See evaluation for individualized goals  Treatment/Interventions: LE strengthening/ROM, Therapeutic exercise, Cognitive reorientation, Equipment eval/education, Bed mobility, Patient/family training  Equipment Recommended: Other (Comment) (pt needs dependent means for out of bed mobilization )       See flowsheet documentation for full assessment, interventions and recommendations  Outcome: Progressing  Prognosis: Fair  Problem List: Decreased strength, Decreased range of motion, Decreased endurance, Decreased mobility, Pain  Assessment: Patient remains consistent with supervision for supine to sit transfers with additional cuing for sit to supine for log roll technique  Min a x1 consistent for sit to stand transfers and increased time secondary to pain  Requires verbal instruction for hand placement and body positioning  Demonstrated ability to ambulate 115' x 1 with roller walker and verbal instructions for posture and step length  Patient fatigued and with increased pain requesting return supine in bed  Continue to focus on transfer and gait progression with emphasis on technique and safety  Barriers to Discharge: Inaccessible home environment     Recommendation: Short-term skilled PT     PT - OK to Discharge: Yes (to STR when stable )    See flowsheet documentation for full assessment

## 2018-09-04 NOTE — SOCIAL WORK
CM spoke with patient and wife at bedside  Patient and wife are agreeable to patient going to [facility] at discharge  Patient and wife are agreeable to having CM set-up transport at time of discharge  Meghan from [facility] updated with plan of care

## 2018-09-04 NOTE — PROGRESS NOTES
Progress Note - Nehemiah Hill 1960, 60 y o  male MRN: [id number]    Unit/Bed#: -01 Encounter: [id number]    Primary Care Provider: No primary care provider on file  Date and time admitted to hospital: 8/25/2018  9:25 PM    * Discitis of lumbosacral region   Assessment & Plan    · Presented with intractable lower back pain, also with pain into his left foot and new foot drop after doing significant exertion in his yard and going on a prolonged plane ride  · History of lower back pain and surgery in [facility] 5 years ago for bulging disc at L5  · Patient reports pain today is stable on current regimen of 2 percocet q 4hrs  No NSAIDS secondary to prior YONATAN which has since resolved  · LSO brace recommended per Neurosurgery  · CT LS spine showed L5S1 disc herniation  · MRI LS spine showed "rind of abnormal enhancement surrounding the L5-S1 disc space primarily anteriorly and laterally  Subtle fluid signal within the L5-S1 disc also noted  The possibility of discitis cannot be excluded at this level " ESR and CRP were elevated, consistent with discitis  · Initially planned for IR biopsy but this was not needed given positive blood cultures  · PT evaluation done and recommended for acute inpatient rehab at discharge  Patient is from [place] but is willing to do short term acute rehab locally prior to return to [place]        Bacteremia due to group B Streptococcus   Assessment & Plan    · Repeat blood cultures negative x 72 hours  · Overall clinically improved but will need long-term IV antibiotics  · On high-dose cephazolin 2 g IV q 8 hours per ID  · PICC line placed today        YONATAN (acute kidney injury) (HCC)resolved as of 8/30/2018   Assessment & Plan    · Suspect this was due to NSAIDs and hypotension/sepsis  · Creatinine has completely normalized after IV fluids  Foot pain, left   Assessment & Plan    · Status post podiatry evaluation    Suspected possible gout  · MRI noted, no osteo  · Currently stable  Avoid NSAIDS secondary to recent YONATAN        Hyponatremiaresolved as of 8/30/2018   Assessment & Plan    · resolved        Hypokalemiaresolved as of 9/2/2018   Assessment & Plan    · Resolved        Alcohol abuse   Assessment & Plan    · Last drink prior to coming to [place]  · In 12 step program but has not remained consistently sober  · Continue thiamine and folic acid  · No symptoms of withdrawal   · Continue nutritional supplements  · Monitor LFTS and platelets        Elevated LFTs   Assessment & Plan    · Patient reports known h/o mildly elevated LFTs which has been attributed to his alcohol use  Probable underlying cirrhosis  · Denies abdominal pain  · Continue to monitor        Thrombocytopenia (HCC)   Assessment & Plan    · stable  · No signs of bleeding            VTE Pharmacologic Prophylaxis:   Pharmacologic: Pharmacologic VTE Prophylaxis contraindicated due to low platelets  Mechanical VTE Prophylaxis in Place: No    Patient Centered Rounds: I have performed bedside rounds with nursing staff today  Discussions with Specialists or Other Care Team Provider: rounded with case mgm    Education and Discussions with Family / Patient: wife    Time Spent for Care: 30 minutes  More than 50% of total time spent on counseling and coordination of care as described above  Current Length of Stay: 7 day(s)    Current Patient Status: Inpatient   Certification Statement: The patient will continue to require additional inpatient hospital stay due to awaiting rehab arrangements    Discharge Plan:   Discharge later today if accepted at [address] [place] [address] and able to be transferred today    Code Status: Level 1 - Full Code    Subjective:   No changes overnight, patient's pain continues to be about the same and is controlled with current regimen (except that he had pain when they lifted his leg down in interventional radiology)   He does report that his appetite is a bit better and he is eating more  No bowel issues  Objective:     Vitals:   Temp (24hrs), Av 8 °F (37 1 °C), Min:98 3 °F (36 8 °C), Max:99 6 °F (37 6 °C)    HR:  [69-75] 69  Resp:  [16-18] 16  BP: (118-149)/(69-87) 118/70  SpO2:  [93 %-95 %] 94 %  There is no height or weight on file to calculate BMI  Input and Output Summary (last 24 hours): Intake/Output Summary (Last 24 hours) at 18 1239  Last data filed at 18 1125   Gross per 24 hour   Intake              840 ml   Output             2400 ml   Net            -1560 ml       Physical Exam:     Physical Exam   Constitutional: He appears well-developed and well-nourished  No distress  HENT:   Head: Normocephalic and atraumatic  Eyes: Conjunctivae are normal  Right eye exhibits no discharge  Left eye exhibits no discharge  No scleral icterus  Neck: Neck supple  Cardiovascular: Normal rate, regular rhythm and normal heart sounds  No murmur heard  Pulmonary/Chest: Effort normal and breath sounds normal  No respiratory distress  He has no wheezes  He has no rales  Abdominal: Soft  He exhibits no distension  There is no tenderness  Musculoskeletal: He exhibits no edema  Neurological: He is alert  Awake alert   Skin: Skin is warm and dry  No rash noted  He is not diaphoretic  No erythema  No pallor  Psychiatric: He has a normal mood and affect  His behavior is normal  Thought content normal    Vitals reviewed          Additional Data:     Labs:      Results from last 7 days  Lab Units 18  0527   WBC Thousand/uL 6 46   HEMOGLOBIN g/dL 11 9*   HEMATOCRIT % 35 7*   PLATELETS Thousands/uL 155   NEUTROS PCT % 46   LYMPHS PCT % 37   MONOS PCT % 13*   EOS PCT % 3       Results from last 7 days  Lab Units 18  0527  18  0515   SODIUM mmol/L 136  < > 136   POTASSIUM mmol/L 4 3  < > 3 2*   CHLORIDE mmol/L 100  < > 100   CO2 mmol/L 29  < > 28   BUN mg/dL 12  < > 8   CREATININE mg/dL 0 88  < > 0 80   CALCIUM mg/dL 9 8  < > 8 6   ALK PHOS U/L  --   --  191*   ALT U/L  --   --  35   AST U/L  --   --  51*   < > = values in this interval not displayed  * I Have Reviewed All Lab Data Listed Above  * Additional Pertinent Lab Tests Reviewed: No New Labs Available For Today    Imaging:    Imaging Reports Reviewed Today Include:   Imaging Personally Reviewed by Myself Includes:      Recent Cultures (last 7 days):       Results from last 7 days  Lab Units 08/30/18  0847 08/28/18  1501 08/28/18  1358   BLOOD CULTURE  No Growth After 4 Days  No Growth After 4 Days   Beta Hemolytic Streptococcus Group B* Beta Hemolytic Streptococcus Group B*   GRAM STAIN RESULT   --  Gram positive cocci in chains Gram positive cocci in chains       Last 24 Hours Medication List:     Current Facility-Administered Medications:  cefazolin 2,000 mg Intravenous Q8H Nora Hinojosa PA-C Last Rate: 2,000 mg (09/04/18 0528)   diphenhydrAMINE-zinc acetate  Topical TID PRN Nora Hinojosa PA-C    docusate sodium 100 mg Oral BID Nora Hinojosa PA-C    folic acid 1 mg Oral Daily Nora Hinojosa PA-C    levothyroxine 50 mcg Oral Early Morning Idania OwensKIRAN    lidocaine 2 patch Transdermal Daily Nora Hinojosa PA-C    melatonin 6 mg Oral HS Nora Hinojosa PA-C    menthol-methyl salicylate  Apply externally TID Nora Hinojosa PA-C    methocarbamol 750 mg Oral Q6H [address] Nora Hinojosa PA-C    morphine injection 2 mg Intravenous Q4H PRN Yamilet Marquez MD    ondansetron 4 mg Intravenous Q6H PRN Jeinfer Burger PA-C    oxyCODONE-acetaminophen 2 tablet Oral Q4H PRN Nora Hinojosa PA-C    pantoprazole 40 mg Oral Early Morning Yamilet Marquez MD    polyethylene glycol 17 g Oral Daily PRN Nora Hinojosa PA-C    senna 1 tablet Oral BID Nora Hinojosa PA-C    thiamine 100 mg Oral Daily Nora Hinojosa PA-C    traMADol 50 mg Oral Q6H PRN Jenifer Burger PA-C         Today, Patient Was Seen By: Arvin Gonzales PA-C    ** Please Note: Dictation voice to text software may have been used in the creation of this document   **

## 2018-09-04 NOTE — ASSESSMENT & PLAN NOTE
· Repeat blood cultures negative x 72 hours  · Overall clinically improved but will need long-term IV antibiotics  · On high-dose cephazolin 2 g IV q 8 hours per ID  · PICC line placed today

## 2018-09-04 NOTE — PLAN OF CARE
Problem: OCCUPATIONAL THERAPY ADULT  Goal: Performs self-care activities at highest level of function for planned discharge setting  See evaluation for individualized goals  Treatment Interventions: ADL retraining, Functional transfer training, Endurance training, Equipment evaluation/education, Compensatory technique education, Continued evaluation, Activityengagement  Equipment Recommended: Bedside commode, Tub seat with back       See flowsheet documentation for full assessment, interventions and recommendations  Outcome: Progressing  Limitation: Decreased ADL status, Decreased Safe judgement during ADL, Decreased endurance, Decreased self-care trans     Assessment: Patient seen for OT tx session day 1  Pt was agreeable to working with OT upon session  Pt reported to have slightly less pain and is concerned w/ "getting ready for therapy"  During the tx session, pt completed bed mobility, functional transfers/mobility, toileting, UB dressing, LB dressing, grooming, eating and LSO brace education  During functional mobility/transfers, pt benefited from + time and usage of RW  Pt required min A x 1 and chair follow during mobility  VC needed for proper hand placement  Toileting was completed w/ S + time  UB/LB dressing required min A  Pt able to don shoes in bed but needed total A to tie shoes  A needed to don gown, but only to tie strings and pull around back  Pt completed grooming 2* setup as well as beverage mgmt  Tx session was tolerated well but limited to pain  Pt demonstrated necessary reinforcement for LSO brace compliance and benefited from education to don/doff brace, pt still experiencing significant pain  Recommendation stands for post acute rehab  Will continue to follow       OT Discharge Recommendation:  (Post Acute Rehab)  OT - OK to Discharge:  (When medically stable)

## 2018-09-04 NOTE — DISCHARGE SUMMARY
Discharge- Shefali Oms 1960, 60 y o  male MRN: [id number]    Unit/Bed#: -01 Encounter: [id number]    Primary Care Provider: No primary care provider on file  Date and time admitted to hospital: 8/25/2018  9:25 PM    * Discitis of lumbosacral region   Assessment & Plan    · Presented with intractable lower back pain, also with pain into his left foot and new foot drop after doing significant exertion in his yard and going on a prolonged plane ride  · History of lower back pain and surgery in [facility] 5 years ago for bulging disc at L5  During the course of this hospitalization I updated his neurosurgeon and internist  · Change percocet to oxycodone 10-15 mg every 4 hours PRN moderate-severe pain given LFTs  · LSO brace recommended per Neurosurgery  · CT LS spine on admission showed L5S1 disc herniation  · MRI LS spine showed "rind of abnormal enhancement surrounding the L5-S1 disc space primarily anteriorly and laterally  Subtle fluid signal within the L5-S1 disc also noted  The possibility of discitis cannot be excluded at this level " ESR and CRP were elevated, consistent with discitis  · Initially planned for IR biopsy but this was not needed given positive blood cultures  · PT evaluation done and recommended for acute inpatient rehab at discharge  Patient is from [place] but is willing to do short term acute rehab locally for 2-3 weeks and then his wife will continue to provide him care locally at their house in the [place] [address] prior to return to [place]        Bacteremia due to group B Streptococcusresolved as of 9/6/2018   Assessment & Plan    · Repeat blood cultures negative @ 5 days  · Overall clinically improved but will need long-term IV antibiotics  · On high-dose cephazolin 2 g IV q 8 hours for 6 weeks total through 10/9/18  · PICC line placed  Discontinue after antibiotics completed  · Weekly CBC w/diff, BMP while on antibiotics          YONATAN (acute kidney injury) (HCC)resolved as of 8/30/2018   Assessment & Plan    · Transient mild YONATAN to 1 5-Suspect this was due to NSAIDs and hypotension/sepsis  · Creatinine has completely normalized after IV fluids  Sepsis (HCC)resolved as of 8/31/2018   Assessment & Plan    · Sepsis criteria of new hypotension and YONATAN on 8/29/18-- now resolved after several fluid boluses and albumin; lactic acid normal; procalcitonin elevated at 2 18  · ID following  · Unclear source of bacteremia? ?recent left foot infection  Xray and MRI of foot showed erosion with no osteomyelitis  Podiatry eval appreciated  Patient treated for cellulitis in July with keflex  · Blood cultures 2/2 positive for b hemolytic strep; repeat samples negative  · 2d echo unremarkable  · IV ancef to complete course            Foot pain, left   Assessment & Plan    · Status post podiatry evaluation  Suspected possible gout  · MRI noted, no osteo  · Currently stable  Avoid NSAIDS secondary to recent YONATAN and tylenol secondary to elevated LFTs  Hyponatremiaresolved as of 8/30/2018   Assessment & Plan    · resolved        Hypokalemiaresolved as of 9/2/2018   Assessment & Plan    · Resolved        Hypertension   Assessment & Plan    · BPs reviewed  · Resume norvasc at a lower dose of 2 5 mg daily and monitor closely        Alcohol abuse   Assessment & Plan    · Last drink prior to coming to [place]  · In 12 step program but has not remained consistently sober  · Continue thiamine and folic acid  · No symptoms of withdrawal   · Continue nutritional supplements  · Monitor LFTS and platelets        Elevated LFTs   Assessment & Plan    · Patient reports known h/o mildly elevated LFTs which has been attributed to his alcohol use  Probable underlying cirrhosis  · Denies abdominal pain  · Continue to monitor  · Discontinue Tylenol component of Percocet and maintain patient only on oxycodone          Thrombocytopenia ([address] )   Assessment & Plan    · stable  · No signs of bleeding              Discharging Physician / Practitioner: Ladonna Gonzalez PA-C  PCP: No primary care provider on file  Admission Date:   Admission Orders     Ordered        08/28/18 1214  Inpatient Admission  Once         08/25/18 2347  Place in Observation (expected length of stay for this patient is less than two midnights)  Once             Discharge Date: 09/06/18    Disposition:      [address] at [facility]    For Discharges to [facility]:   · Not Applicable to this Patient - Not Applicable to this Patient    Reason for Admission: back pain    Discharge Diagnoses:     Please see assessment and plan section above for further details regarding discharge diagnoses  Resolved Problems  Date Reviewed: 9/6/2018          Resolved    Hypokalemia 9/2/2018     Resolved by  Laura Shelby MD    Hyponatremia 8/30/2018     Resolved by  Ladonna Gonzalez PA-C    Sepsis ([facility] ) 8/31/2018     Resolved by  Ladonna Gonzalez PA-C    YONATAN (acute kidney injury) ([facility] [address] [facility] ) 8/30/2018     Resolved by  Ladonna Gonzalez PA-C    Bacteremia due to group B Streptococcus 9/6/2018     Resolved by  Ladonna Gonzalez PA-C          Consultations During Hospital Stay:  · Neurosurgery  · Infectious Disease  · Podiatry    Procedures Performed:     · 2D echocardiogram= normal ejection fraction 60% with no vegetation  · Please see below for the rest of the testing done during this hospitalization    Medication Adjustments and Discharge Medications:  · Summary of Medication Adjustments made as a result of this hospitalization:  IV Ancef initiated, pain control regimen including Percocet and Robaxin  · Medication Dosing Tapers - Please refer to Discharge Medication List for details on any medication dosing tapers (if applicable to patient)    · Medications being temporarily held (include recommended restart time):  Norvasc held for hypotension  · Discharge Medication List: See after visit summary for reconciled discharge medications  Wound Care Recommendations:  When applicable, please see wound care section of After Visit Summary  Diet Recommendations at Discharge:  Diet -        Diet Orders            Start     Ordered    08/30/18 0853  Dietary nutrition supplements  Once     Comments:  Poor appetite low albumin   Question Answer Comment   Select Supplement: Ensure Enlive-Chocolate    Frequency Breakfast, Kasia Handsome, Dinner        08/30/18 0853    08/29/18 0835  Diet Regular; Regular House  Diet effective now     Question Answer Comment   Diet Type Regular    Regular Regular House    RD to adjust diet per protocol? Yes        08/29/18 0836    08/26/18 0549  Room Service  Once     Question:  Type of Service  Answer:  Room Service - Appropriate with Assistance    08/26/18 0548          Instructions for any Catheters / Lines Present at Discharge (including removal date, if applicable): picc    Significant Findings / Test Results:     IR PICC line   Final Result by Filipe Aleman MD (09/04 1330)   Impression: Fluoroscopic image demonstrates the tip of the catheter at the cavoatrial junction, in satisfactory position  Workstation performed: [id number]         MRI foot/forefoot toes left w wo contrast   Final Result by Stephen Cardoza MD (08/30 2890)      No finding to suggest osteomyelitis      Small joint effusion at the level of the 1st metatarsophalangeal joint      Soft tissue swelling and edema at the dorsal aspect of the forefoot      Erosive changes at the distal medial aspect of the 1st proximal phalanx and at the base of the 4th metatarsal, evaluate for erosive arthritis/gout  Workstation performed: [id number]         XR foot 3+ vw left   Final Result by Isabel Valero MD (08/28 9632)      Erosion involving the medial aspect of the distal 1st proximal phalanx potentially reflecting sequela of gout              Workstation performed: [id number]         MRI lumbar spine w wo contrast   Final Result by The TeeBeeDee DO Jagdeep (08/28 1125)      There is a rind of abnormal enhancement surrounding the L5-S1 disc space primarily anteriorly and laterally  Subtle fluid signal within the L5-S1 disc also noted  The possibility of discitis cannot be excluded at this level  Spondylotic degenerative changes are noted as described above  Moderate central and bilateral foraminal narrowing noted at L4-5 and L5-S1  Image quality is significantly degraded by patient motion artifact  I personally discussed this study with Dr Allen Rosas on 8/28/2018 at 11:00 AM                         Workstation performed: [id number]         CT spine lumbar wo contrast   Final Result by Kym Andrade MD (08/27 1139)      Multilevel degenerative changes  Endplate changes most severe at the L5-S1 level  No phlegmon at L5-S1 to suggest changes are related to discitis/osteomyelitis  If there is clinical concern for infection, serologic exclusion suggested         Workstation performed: [id number]         XR spine lumbar minimum 4 views non injury   Final Result by Cl Scott MD (08/26 5400)      Degenerative disease as above  No acute findings  The L5-S1 disc space is not well visualized in profile  If there is any clinical concern for discitis at L5-S1, CT would be more sensitive  Workstation performed: [id number]           Incidental Findings:   · none    Test Results Pending at Discharge (will require follow up):   · none     Outpatient Tests Requested:  · Routine cbc/bmp    Complications:  History of alcohol abuse    Hospital Course:     Saleem Liu is a 60 y o  male patient who originally presented to the hospital on 8/25/2018 due to severe back pain  Patient has prior history of back pain due to herniated disc status post surgery in [place] 5 years ago    He had been recently working out in his yard heavily and subsequently got on the long plane ride from [place] to [place] to visit family and take his son to [facility]  He had severe pain and inability to ambulate and ended up coming directly to the hospital after his flight  X-ray on admission showed degenerative disease and subsequently a CT scan of the lumbar sacral spine degenerative changes most severe at L5-S1  He had ongoing pain and was noted to have elevated inflammatory markers which prompted us to perform an MRI of the lumbar sacral spine  There was noted to be an abnormal rind of enhancement concerning for diskitis  He was seen by Neurosurgery and Infectious Disease  Initially our plan was to do an IR biopsy of the area but the day prior, blood cultures were obtained, and were positive for 2 sets  We were able to cancel the IR biopsy and provided him with IV vancomycin dosing initially  Relatively rapidly the blood cultures were found to be group B strep and we converted him to IV cefazolin  A 2D echocardiogram did not reveal any vegetation  The source of his group B bacteremia was unclear but we speculated that his left foot may have been the reason as he was treated for an infection in [place] within the last month  Foot x-ray and subsequently an MRI of the left foot were done and did not show any evidence of osteomyelitis although there was some erosion consistent with possible prior gout  He was seen in consultation by Podiatry  Although on admission he had no sepsis criteria, on August 29th he did develop fairly persistent hypotension and mild acute kidney injury  We had been holding his Norvasc but will resume it at a lower dose at discharge  We had been providing him NSAIDs for pain relief which were then discontinued due to YONATAN  He required multiple fluid boluses and albumin but was able to remain on the floor and did not require intensive care unit stay  His hypotension has subsequently resolved and there have been no other acute issues  Repeat blood cultures were obtained and were negative at 72 hr    PICC line was subsequently placed and he will complete a 6 week course of IV antibiotics to be completed on October 9th  Oral antibiotics may be indicated after the completion of his IV antibiotics  Unfortunately he continued to have significant issues with pain for which he required ongoing pain regimen and had physical limitations for white she required inpatient rehab  Again he was not from the area but arrangements were made from case management for him to go to short-term rehab at [facility]  Outpatient follow-up with Infectious Disease has also been scheduled  Other issues complicating his hospital stay included his underlying history of alcohol abuse for which he is in a 12 step program to abstain from alcohol  He did not demonstrate any alcohol withdrawal symptoms  He had very mild transaminitis which remained stable and also had underlying thrombocytopenia without any bleeding  He had hypoalbuminemia and poor appetite and was started on nutrition supplements  His appetite has subsequently improved  Condition at Discharge: stable     Discharge Day Visit / Exam:       Vitals: Blood Pressure: 124/68 (09/06/18 0743)  Pulse: 65 (09/06/18 0743)  Temperature: 99 °F (37 2 °C) (09/06/18 0743)  Temp Source: Oral (09/06/18 0743)  Respirations: 18 (09/06/18 0743)  Weight - Scale: 75 5 kg (166 lb 7 2 oz) (08/25/18 2125)  SpO2: 94 % (09/06/18 0743)  Exam:   Physical Exam- see note from earlier    Discussion with Family:     Goals of Care Discussions:  · Code Status at Discharge: Level 1 - Full Code  · Were there any Goals of Care Discussions during Hospitalization?: No  · Results of any General Goals of Care Discussions:    · POLST Completed: No   · If POLST Completed, Summary of POLST Agreement Provided Here:    · OK to Rehospitalize if Needed? Yes    Discharge instructions/Information to patient and family:   See after visit summary section titled Discharge Instructions for information provided to patient and family  Planned Readmission: none      Discharge Statement:  I spent 40 minutes discharging the patient  This time was spent on the day of discharge  I had direct contact with the patient on the day of discharge  Greater than 50% of the total time was spent examining patient, answering all patient questions, arranging and discussing plan of care with patient as well as directly providing post-discharge instructions  Additional time then spent on discharge activities      ** Please Note: This note has been constructed using a voice recognition system **

## 2018-09-04 NOTE — PHYSICAL THERAPY NOTE
PHYSICAL THERAPY NOTE    Patient Name: Danial Claude EETGO'S Date: 18 1546   Pain Assessment   Pain Assessment 0-10   Pain Score 7   Pain Type Acute pain   Pain Location Back   Pain Orientation Lower;Right   Restrictions/Precautions   Weight Bearing Precautions Per Order No   Braces or Orthoses LSO  (when OOB for comfort)   Other Precautions Fall Risk;Pain   General   Family/Caregiver Present No   Subjective   Subjective Patient supine in bed and is agreeable to therapy session  Patient identifers obtained from name &   Bed Mobility   Supine to Sit 6  Modified independent   Additional items Bedrails; Increased time required   Sit to Supine 5  Supervision   Additional items Assist x 1;Bedrails; Increased time required;LE management;Verbal cues   Transfers   Sit to Stand 4  Minimal assistance   Additional items Assist x 1; Armrests; Increased time required;Verbal cues   Stand to Sit 4  Minimal assistance   Additional items Assist x 1; Armrests; Increased time required;Verbal cues   Ambulation/Elevation   Gait pattern Antalgic;Decreased foot clearance;L Foot drag;Excessively slow; Step to; Improper Weight shift   Gait Assistance 4  Minimal assist   Additional items Assist x 1;Verbal cues   Assistive Device Rolling walker   Distance 115' x1, 15' x1   Balance   Static Sitting Fair   Dynamic Sitting Fair   Static Standing Poor +   Dynamic Standing Poor   Ambulatory Poor   Endurance Deficit   Endurance Deficit Yes   Endurance Deficit Description limited ambulation distance   Activity Tolerance   Activity Tolerance Patient limited by fatigue;Patient limited by pain   Nurse Made Aware Spoke to [employer], RN    Assessment   Prognosis Fair   Problem List Decreased strength;Decreased range of motion;Decreased endurance;Decreased mobility;Pain   Assessment Patient remains consistent with supervision for supine to sit transfers with additional cuing for sit to supine for log roll technique  Min a x1 consistent for sit to stand transfers and increased time secondary to pain  Requires verbal instruction for hand placement and body positioning  Demonstrated ability to ambulate 115' x 1 with roller walker and verbal instructions for posture and step length  Patient fatigued and with increased pain requesting return supine in bed  Continue to focus on transfer and gait progression with emphasis on technique and safety  Barriers to Discharge Inaccessible home environment   Goals   Patient Goals for the pain to go away   STG Expiration Date 09/07/18   Treatment Day 5   Plan   Treatment/Interventions LE strengthening/ROM; Therapeutic exercise;Cognitive reorientation;Equipment eval/education; Bed mobility; Patient/family training   Progress Slow progress, decreased activity tolerance   PT Frequency 5x/wk   Recommendation   Recommendation Short-term skilled PT   Equipment Recommended Other (Comment)       Zamzam Huizar PTA

## 2018-09-05 PROCEDURE — 99232 SBSQ HOSP IP/OBS MODERATE 35: CPT | Performed by: PHYSICIAN ASSISTANT

## 2018-09-05 RX ORDER — OXYCODONE HYDROCHLORIDE 10 MG/1
10 TABLET ORAL EVERY 4 HOURS PRN
Status: DISCONTINUED | OUTPATIENT
Start: 2018-09-05 | End: 2018-09-06 | Stop reason: HOSPADM

## 2018-09-05 RX ADMIN — SENNOSIDES 8.6 MG: 8.6 TABLET, FILM COATED ORAL at 17:44

## 2018-09-05 RX ADMIN — METHOCARBAMOL 750 MG: 750 TABLET, FILM COATED ORAL at 17:44

## 2018-09-05 RX ADMIN — MENTHOL, METHYL SALICYLATE: 10; 15 CREAM TOPICAL at 23:11

## 2018-09-05 RX ADMIN — METHOCARBAMOL 750 MG: 750 TABLET, FILM COATED ORAL at 05:37

## 2018-09-05 RX ADMIN — OXYCODONE HYDROCHLORIDE 10 MG: 10 TABLET ORAL at 16:48

## 2018-09-05 RX ADMIN — FOLIC ACID 1 MG: 1 TABLET ORAL at 08:32

## 2018-09-05 RX ADMIN — DOCUSATE SODIUM 100 MG: 100 CAPSULE, LIQUID FILLED ORAL at 08:32

## 2018-09-05 RX ADMIN — MELATONIN TAB 3 MG 6 MG: 3 TAB at 21:18

## 2018-09-05 RX ADMIN — DOCUSATE SODIUM 100 MG: 100 CAPSULE, LIQUID FILLED ORAL at 17:44

## 2018-09-05 RX ADMIN — OXYCODONE HYDROCHLORIDE 10 MG: 10 TABLET ORAL at 11:06

## 2018-09-05 RX ADMIN — Medication 100 MG: at 08:32

## 2018-09-05 RX ADMIN — OXYCODONE HYDROCHLORIDE 10 MG: 10 TABLET ORAL at 20:18

## 2018-09-05 RX ADMIN — CEFAZOLIN SODIUM 2000 MG: 2 SOLUTION INTRAVENOUS at 12:20

## 2018-09-05 RX ADMIN — CEFAZOLIN SODIUM 2000 MG: 2 SOLUTION INTRAVENOUS at 20:18

## 2018-09-05 RX ADMIN — OXYCODONE HYDROCHLORIDE AND ACETAMINOPHEN 2 TABLET: 5; 325 TABLET ORAL at 05:37

## 2018-09-05 RX ADMIN — PANTOPRAZOLE SODIUM 40 MG: 40 TABLET, DELAYED RELEASE ORAL at 05:37

## 2018-09-05 RX ADMIN — LEVOTHYROXINE SODIUM 50 MCG: 50 TABLET ORAL at 05:37

## 2018-09-05 RX ADMIN — CEFAZOLIN SODIUM 2000 MG: 2 SOLUTION INTRAVENOUS at 05:37

## 2018-09-05 RX ADMIN — SENNOSIDES 8.6 MG: 8.6 TABLET, FILM COATED ORAL at 08:32

## 2018-09-05 RX ADMIN — METHOCARBAMOL 750 MG: 750 TABLET, FILM COATED ORAL at 23:11

## 2018-09-05 RX ADMIN — METHOCARBAMOL 750 MG: 750 TABLET, FILM COATED ORAL at 12:00

## 2018-09-05 NOTE — SOCIAL WORK
BIN spoke with Meghan from [facility] spoke with a representative from [facility]  Meghan sent the clinicals to [facility]  Per Tevin, they have five days to make a determination  The clinicals faxed over were not sent to a clinical nurse to review  Meghan informed the representative that patient has been medically stable for discharge  Representative will forward patient's information to a clinical nurse to start the review process

## 2018-09-05 NOTE — SOCIAL WORK
BIN called [facility] regarding patient's insurance auth  [employer] representative stated that a clinical nurse was not assigned to patient's case  Representative will send an email to a clinical nurse and have them call BIN

## 2018-09-05 NOTE — PROGRESS NOTES
Progress Note - Mitchell Bird 1960, 60 y o  male MRN: [id number]  Unit/Bed#: -01 Encounter: [id number]  Primary Care Provider: No primary care provider on file  Date and time admitted to hospital: 8/25/2018  9:25 PM    * Discitis of lumbosacral region   Assessment & Plan    · Presented with intractable lower back pain, also with pain into his left foot and new foot drop after doing significant exertion in his yard and going on a prolonged plane ride  · History of lower back pain and surgery in [facility] 5 years ago for bulging disc at L5  During the course of this hospitalization I updated his neurosurgeon and internist  · Change percocet to oxycodone 10-15 mg every 4 hours PRN moderate-severe pain given LFTs  · LSO brace recommended per Neurosurgery  · CT LS spine on admission showed L5S1 disc herniation  · MRI LS spine showed "rind of abnormal enhancement surrounding the L5-S1 disc space primarily anteriorly and laterally  Subtle fluid signal within the L5-S1 disc also noted  The possibility of discitis cannot be excluded at this level " ESR and CRP were elevated, consistent with discitis  · Initially planned for IR biopsy but this was not needed given positive blood cultures  · PT evaluation done and recommended for acute inpatient rehab at discharge  Patient is from [place] but is willing to do short term acute rehab locally for 2-3 weeks and then his wife will continue to provide him care locally at their house in the [place] [address] prior to return to [facility] pain, left   Assessment & Plan    · Status post podiatry evaluation  Suspected possible gout  · MRI noted, no osteo  · Currently stable  Avoid NSAIDS secondary to recent YONATAN and tylenol secondary to elevated LFTs          Alcohol abuse   Assessment & Plan    · Last drink prior to coming to [place]  · In 12 step program but has not remained consistently sober  · Continue thiamine and folic acid  · No symptoms of withdrawal   · Continue nutritional supplements  · Monitor LFTS and platelets        Thrombocytopenia (HCC)   Assessment & Plan    · stable  · No signs of bleeding          Elevated LFTs   Assessment & Plan    · Patient reports known h/o mildly elevated LFTs which has been attributed to his alcohol use  Probable underlying cirrhosis  · Denies abdominal pain  · Continue to monitor  · Discontinue Tylenol component of Percocet and maintain patient only on oxycodone  Bacteremia due to group B Streptococcusresolved as of 9/4/2018   Assessment & Plan    · Repeat blood cultures negative @ 5 days  · Overall clinically improved but will need long-term IV antibiotics  · On high-dose cephazolin 2 g IV q 8 hours for 6 weeks total through 10/9/18  · PICC line placed  Discontinue after antibiotics completed  · Weekly CBC w/diff, BMP while on antibiotics  YONATAN (acute kidney injury) (HCC)resolved as of 8/30/2018   Assessment & Plan    · Transient mild YONATAN to 1 5-Suspect this was due to NSAIDs and hypotension/sepsis  · Creatinine has completely normalized after IV fluids  · I have not resumed his Norvasc as his blood pressures have remained stable without it          Sepsis (HCC)resolved as of 8/31/2018   Assessment & Plan    · Sepsis criteria of new hypotension and YONATAN on 8/29/18-- now resolved after several fluid boluses and albumin; lactic acid normal; procalcitonin elevated at 2 18  · ID following  · Unclear source of bacteremia? ?recent left foot infection  Xray and MRI of foot showed erosion with no osteomyelitis  Podiatry eval appreciated   Patient treated for cellulitis in July with keflex  · Blood cultures 2/2 positive for b hemolytic strep; repeat samples negative  · 2d echo unremarkable  · IV ancef to complete course            Hyponatremiaresolved as of 8/30/2018   Assessment & Plan    · resolved        Hypokalemiaresolved as of 9/2/2018   Assessment & Plan    · Resolved          VTE Pharmacologic Prophylaxis:   Pharmacologic: Pharmacologic VTE Prophylaxis contraindicated due to Thrombocytopenia  Mechanical: Mechanical VTE prophylaxis in place  Patient Centered Rounds: I have performed bedside rounds with nursing staff today  Discussions with Specialists or Other Care Team Provider:  Case Management  Education and Discussions with Family / Patient:  Patient's questions were answered to the best my ability  Time Spent for Care: 20 minutes  More than 50% of total time spent on counseling and coordination of care as described above  Current Length of Stay: 8 day(s)  Current Patient Status: Inpatient   Certification Statement: The patient will continue to require additional inpatient hospital stay due to Pending insurance authorization for discharge to [facility]    Discharge Plan:  Patient is medically stable for discharge to [facility] at the [facility] when authorization has been obtained  Code Status: Level 1 - Full Code    Subjective:   Patient is doing fairly well overall  He has no new complaints  Objective:   Vitals:   Temp (24hrs), Av 3 °F (36 8 °C), Min:97 9 °F (36 6 °C), Max:98 9 °F (37 2 °C)    HR:  [64-77] 64  Resp:  [16-18] 16  BP: (127-154)/(62-83) 151/78  SpO2:  [97 %-98 %] 98 %  There is no height or weight on file to calculate BMI  Input and Output Summary (last 24 hours): Intake/Output Summary (Last 24 hours) at 18 1329  Last data filed at 18 0900   Gross per 24 hour   Intake              240 ml   Output             2450 ml   Net            -2210 ml       Physical Exam:     Physical Exam   HENT:   Head: Normocephalic and atraumatic  Mouth/Throat: Oropharynx is clear and moist and mucous membranes are normal    Eyes: No scleral icterus  Cardiovascular: Normal rate and regular rhythm  No murmur heard  Pulmonary/Chest: Breath sounds normal  He has no wheezes  He has no rales  He exhibits no tenderness  Abdominal: Soft   Bowel sounds are normal  He exhibits no distension  There is no tenderness  Musculoskeletal: Normal range of motion  He exhibits no edema  Skin: Skin is warm and dry  No rash noted  Psychiatric: He has a normal mood and affect  Vitals reviewed  Additional Data:   Labs:    Results from last 7 days  Lab Units 09/04/18  0527   WBC Thousand/uL 6 46   HEMOGLOBIN g/dL 11 9*   HEMATOCRIT % 35 7*   PLATELETS Thousands/uL 155   NEUTROS PCT % 46   LYMPHS PCT % 37   MONOS PCT % 13*   EOS PCT % 3       Results from last 7 days  Lab Units 09/04/18  0527  08/31/18  0515   SODIUM mmol/L 136  < > 136   POTASSIUM mmol/L 4 3  < > 3 2*   CHLORIDE mmol/L 100  < > 100   CO2 mmol/L 29  < > 28   BUN mg/dL 12  < > 8   CREATININE mg/dL 0 88  < > 0 80   CALCIUM mg/dL 9 8  < > 8 6   ALK PHOS U/L  --   --  191*   ALT U/L  --   --  35   AST U/L  --   --  51*   < > = values in this interval not displayed  * I Have Reviewed All Lab Data Listed Above  * Additional Pertinent Lab Tests Reviewed: All Labs Within Last 24 Hours Reviewed    Imaging:    Imaging Reports Reviewed Today Include:  None new    Cultures:   Blood Culture:   Lab Results   Component Value Date    BLOODCX No Growth After 5 Days  08/30/2018    BLOODCX No Growth After 5 Days   08/30/2018    BLOODCX Beta Hemolytic Streptococcus Group B (A) 08/28/2018    BLOODCX Beta Hemolytic Streptococcus Group B (A) 08/28/2018     Urine Culture: No results found for: URINECX  Sputum Culture: No components found for: SPUTUMCX  Wound Culture: No results found for: WOUNDCULT    Last 24 Hours Medication List:     Current Facility-Administered Medications:  cefazolin 2,000 mg Intravenous Q8H Nora Hinojosa PA-C Last Rate: 2,000 mg (09/05/18 1220)   diphenhydrAMINE-zinc acetate  Topical TID PRN Nora Hinojosa PA-C    docusate sodium 100 mg Oral BID Nora Hinojosa PA-C    folic acid 1 mg Oral Daily Nora Hinojosa PA-C    levothyroxine 50 mcg Oral Early Morning Giulia Bailon PA-C lidocaine 2 patch Transdermal Daily Nora Hinojosa PA-C    melatonin 6 mg Oral HS Nora Hinojosa PA-C    menthol-methyl salicylate  Apply externally TID Nora Hinojosa PA-C    methocarbamol 750 mg Oral Q6H [address] Nora Hinojosa PA-C    morphine injection 2 mg Intravenous Q4H PRN Yamilet Marquez MD    ondansetron 4 mg Intravenous Q6H PRN Unique Mayes PA-C    oxyCODONE 10 mg Oral Q4H PRN Meena Lo PA-C    oxyCODONE 15 mg Oral Q4H PRN Meena Lo PA-C    pantoprazole 40 mg Oral Early Morning Yamilet Marquez MD    polyethylene glycol 17 g Oral Daily PRN Nora Hinojosa PA-C    senna 1 tablet Oral BID Nora Hinojosa PA-C    thiamine 100 mg Oral Daily Nora Hinojosa PA-C    traMADol 50 mg Oral Q6H PRN Unique Mayes PA-C         Today, Patient Was Seen By: Meena Lo PA-C    ** Please Note: Dragon 360 Dictation voice to text software may have been used in the creation of this document   **

## 2018-09-05 NOTE — SOCIAL WORK
CM spoke with patient at bedside  Patient would like the phone number and his reference number for insurance auth  CM provided information to patient  Patient would not like his wife contacted at this time

## 2018-09-05 NOTE — CASE MANAGEMENT
Continued Stay Review    Date:9/5/2018    Continued pain  Vital Signs: /78 (BP Location: Right arm)   Pulse 64   Temp 98 °F (36 7 °C) (Oral)   Resp 16   Wt 75 5 kg (166 lb 7 2 oz)   SpO2 98%     Medications:   Scheduled Meds:   Current Facility-Administered Medications:  cefazolin 2,000 mg Intravenous Q8H Last Rate: 2,000 mg (09/05/18 1220)   diphenhydrAMINE-zinc acetate  Topical TID PRN    docusate sodium 100 mg Oral BID    folic acid 1 mg Oral Daily    levothyroxine 50 mcg Oral Early Morning    lidocaine 2 patch Transdermal Daily    melatonin 6 mg Oral HS    menthol-methyl salicylate  Apply externally TID    methocarbamol 750 mg Oral Q6H [address]    morphine injection 2 mg Intravenous Q4H PRN    ondansetron 4 mg Intravenous Q6H PRN    oxyCODONE 10 mg Oral Q4H PRN    oxyCODONE 15 mg Oral Q4H PRN    pantoprazole 40 mg Oral Early Morning    polyethylene glycol 17 g Oral Daily PRN    senna 1 tablet Oral BID    thiamine 100 mg Oral Daily    traMADol 50 mg Oral Q6H PRN      Continuous Infusions:    PRN Meds:     oxyCODONE 10 mg- used x 1  Percocet - used x 1 and pain medication changed to oxycodone    Abnormal Labs/Diagnostic Results:   9/4/2018 wbc 6 46, hgb 11 9, hct 35 7    Age/Sex: 60 y o  male     Assessment/Plan:   * Discitis of lumbosacral region   Assessment & Plan     · Presented with intractable lower back pain, also with pain into his left foot and new foot drop after doing significant exertion in his yard and going on a prolonged plane ride  · History of lower back pain and surgery in [place] 5 years ago for bulging disc at L5  During the course of this hospitalization I updated his neurosurgeon and internist  · Change percocet to oxycodone 10-15 mg every 4 hours PRN moderate-severe pain given LFTs    · LSO brace recommended per Neurosurgery  · CT LS spine on admission showed L5S1 disc herniation  · MRI LS spine showed "rind of abnormal enhancement surrounding the L5-S1 disc space primarily anteriorly and laterally  Subtle fluid signal within the L5-S1 disc also noted   The possibility of discitis cannot be excluded at this level " ESR and CRP were elevated, consistent with discitis  · Initially planned for IR biopsy but this was not needed given positive blood cultures  · PT evaluation done and recommended for acute inpatient rehab at discharge  Patient is from [place] but is willing to do short term acute rehab locally for 2-3 weeks and then his wife will continue to provide him care locally at their house in the [place] [address] prior to return to [address] [place] [address]  pain, left   Assessment & Plan     · Status post podiatry evaluation  Suspected possible gout  · MRI noted, no osteo  · Currently stable  Avoid NSAIDS secondary to recent YONATAN and tylenol secondary to elevated LFTs          Alcohol abuse   Assessment & Plan     · Last drink prior to coming to [place]  · In 12 step program but has not remained consistently sober  · Continue thiamine and folic acid  · No symptoms of withdrawal   · Continue nutritional supplements  · Monitor LFTS and platelets          Thrombocytopenia (HCC)   Assessment & Plan     · stable  · No signs of bleeding             Elevated LFTs   Assessment & Plan     · Patient reports known h/o mildly elevated LFTs which has been attributed to his alcohol use  Probable underlying cirrhosis  · Denies abdominal pain  · Continue to monitor  · Discontinue Tylenol component of Percocet and maintain patient only on oxycodone           Bacteremia due to group B Streptococcusresolved as of 9/4/2018   Assessment & Plan     · Repeat blood cultures negative @ 5 days  · Overall clinically improved but will need long-term IV antibiotics  · On high-dose cephazolin 2 g IV q 8 hours for 6 weeks total through 10/9/18  · PICC line placed  Discontinue after antibiotics completed  · Weekly CBC w/diff, BMP while on antibiotics            YONATAN (acute kidney injury) (HCC)resolved as of 8/30/2018   Assessment & Plan     · Transient mild YONATAN to 1 5-Suspect this was due to NSAIDs and hypotension/sepsis  · Creatinine has completely normalized after IV fluids  · I have not resumed his Norvasc as his blood pressures have remained stable without it             Sepsis (HCC)resolved as of 8/31/2018   Assessment & Plan     · Sepsis criteria of new hypotension and YONATAN on 8/29/18-- now resolved after several fluid boluses and albumin; lactic acid normal; procalcitonin elevated at 2 18  · ID following  · Unclear source of bacteremia? ?recent left foot infection  Xray and MRI of foot showed erosion with no osteomyelitis  Podiatry eval appreciated   Patient treated for cellulitis in July with keflex  · Blood cultures 2/2 positive for b hemolytic strep; repeat samples negative  · 2d echo unremarkable  · IV ancef to complete course               Hyponatremiaresolved as of 8/30/2018   Assessment & Plan     · resolved          Hypokalemiaresolved as of 9/2/2018   Assessment & Plan     · Resolved            Discharge Plan: short term rehab - awaiting insurance authorization for acute rehab at [facility]

## 2018-09-06 ENCOUNTER — HOSPITAL ENCOUNTER (OUTPATIENT)
Facility: HOSPITAL | Age: 58
Discharge: HOME | End: 2018-09-19
Attending: PHYSICAL MEDICINE & REHABILITATION | Admitting: PHYSICAL MEDICINE & REHABILITATION

## 2018-09-06 VITALS
RESPIRATION RATE: 18 BRPM | WEIGHT: 166.45 LBS | OXYGEN SATURATION: 94 % | HEART RATE: 65 BPM | DIASTOLIC BLOOD PRESSURE: 68 MMHG | TEMPERATURE: 99 F | SYSTOLIC BLOOD PRESSURE: 124 MMHG

## 2018-09-06 PROBLEM — R78.81 BACTEREMIA DUE TO GROUP B STREPTOCOCCUS: Status: RESOLVED | Noted: 2018-09-01 | Resolved: 2018-09-06

## 2018-09-06 PROBLEM — B95.1 BACTEREMIA DUE TO GROUP B STREPTOCOCCUS: Status: RESOLVED | Noted: 2018-09-01 | Resolved: 2018-09-06

## 2018-09-06 PROCEDURE — 99239 HOSP IP/OBS DSCHRG MGMT >30: CPT | Performed by: PHYSICIAN ASSISTANT

## 2018-09-06 RX ORDER — LANOLIN ALCOHOL/MO/W.PET/CERES
6 CREAM (GRAM) TOPICAL
Refills: 0
Start: 2018-09-06

## 2018-09-06 RX ORDER — LIDOCAINE 50 MG/G
2 PATCH TOPICAL DAILY
Qty: 30 PATCH | Refills: 0
Start: 2018-09-06

## 2018-09-06 RX ORDER — MUSCLE RUB CREAM 100; 150 MG/G; MG/G
CREAM TOPICAL 3 TIMES DAILY
Refills: 0
Start: 2018-09-06

## 2018-09-06 RX ORDER — OXYCODONE HYDROCHLORIDE 10 MG/1
10 TABLET ORAL EVERY 4 HOURS PRN
Qty: 30 TABLET | Refills: 0 | Status: SHIPPED | OUTPATIENT
Start: 2018-09-06 | End: 2018-09-16

## 2018-09-06 RX ORDER — DOCUSATE SODIUM 100 MG/1
100 CAPSULE, LIQUID FILLED ORAL 2 TIMES DAILY
Qty: 10 CAPSULE | Refills: 0
Start: 2018-09-06

## 2018-09-06 RX ORDER — POLYETHYLENE GLYCOL 3350 17 G/17G
17 POWDER, FOR SOLUTION ORAL DAILY PRN
Qty: 14 EACH | Refills: 0
Start: 2018-09-06

## 2018-09-06 RX ORDER — SENNOSIDES 8.6 MG
1 TABLET ORAL 2 TIMES DAILY
Qty: 120 EACH | Refills: 0
Start: 2018-09-06

## 2018-09-06 RX ORDER — LANOLIN ALCOHOL/MO/W.PET/CERES
100 CREAM (GRAM) TOPICAL DAILY
Refills: 0
Start: 2018-09-06

## 2018-09-06 RX ORDER — TRAMADOL HYDROCHLORIDE 50 MG/1
50 TABLET ORAL EVERY 6 HOURS PRN
Qty: 30 TABLET | Refills: 0 | Status: SHIPPED | OUTPATIENT
Start: 2018-09-06 | End: 2018-09-16

## 2018-09-06 RX ORDER — METHOCARBAMOL 750 MG/1
750 TABLET, FILM COATED ORAL EVERY 6 HOURS SCHEDULED
Refills: 0
Start: 2018-09-06

## 2018-09-06 RX ORDER — DIPHENHYDRAMINE HYDROCHLORIDE, ZINC ACETATE 2; .1 G/100G; G/100G
CREAM TOPICAL 3 TIMES DAILY PRN
Qty: 28.4 G | Refills: 0
Start: 2018-09-06

## 2018-09-06 RX ORDER — AMLODIPINE BESYLATE 2.5 MG/1
2.5 TABLET ORAL DAILY
Start: 2018-09-06

## 2018-09-06 RX ORDER — FOLIC ACID 1 MG/1
1 TABLET ORAL DAILY
Refills: 0
Start: 2018-09-06

## 2018-09-06 RX ORDER — PANTOPRAZOLE SODIUM 40 MG/1
40 TABLET, DELAYED RELEASE ORAL
Refills: 0
Start: 2018-09-06

## 2018-09-06 RX ADMIN — OXYCODONE HYDROCHLORIDE 10 MG: 10 TABLET ORAL at 11:27

## 2018-09-06 RX ADMIN — OXYCODONE HYDROCHLORIDE 10 MG: 10 TABLET ORAL at 06:34

## 2018-09-06 RX ADMIN — FOLIC ACID 1 MG: 1 TABLET ORAL at 09:59

## 2018-09-06 RX ADMIN — Medication 100 MG: at 10:02

## 2018-09-06 RX ADMIN — CEFAZOLIN SODIUM 2000 MG: 2 SOLUTION INTRAVENOUS at 06:29

## 2018-09-06 RX ADMIN — PANTOPRAZOLE SODIUM 40 MG: 40 TABLET, DELAYED RELEASE ORAL at 06:28

## 2018-09-06 RX ADMIN — METHOCARBAMOL 750 MG: 750 TABLET, FILM COATED ORAL at 06:29

## 2018-09-06 RX ADMIN — CEFAZOLIN SODIUM 2000 MG: 2 SOLUTION INTRAVENOUS at 13:04

## 2018-09-06 RX ADMIN — MENTHOL, METHYL SALICYLATE: 10; 15 CREAM TOPICAL at 10:00

## 2018-09-06 RX ADMIN — TRAMADOL HYDROCHLORIDE 50 MG: 50 TABLET, COATED ORAL at 14:20

## 2018-09-06 RX ADMIN — LEVOTHYROXINE SODIUM 50 MCG: 50 TABLET ORAL at 06:28

## 2018-09-06 RX ADMIN — METHOCARBAMOL 750 MG: 750 TABLET, FILM COATED ORAL at 13:04

## 2018-09-06 RX ADMIN — SENNOSIDES 8.6 MG: 8.6 TABLET, FILM COATED ORAL at 09:59

## 2018-09-06 RX ADMIN — DOCUSATE SODIUM 100 MG: 100 CAPSULE, LIQUID FILLED ORAL at 09:59

## 2018-09-06 NOTE — SOCIAL WORK
BIN spoke with Meghan from [address] [place] [address]  Patient received insurance authorization  Authorization number is [id number]  Patient's wife is picking patient up for discharge to [address] [place] [address]

## 2018-09-06 NOTE — ASSESSMENT & PLAN NOTE
· Presented with intractable lower back pain, also with pain into his left foot and new foot drop after doing significant exertion in his yard and going on a prolonged plane ride  · History of lower back pain and surgery in [facility] 5 years ago for bulging disc at L5  During the course of this hospitalization I updated his neurosurgeon and internist  · Change percocet to oxycodone 10-15 mg every 4 hours PRN moderate-severe pain given LFTs  · LSO brace recommended per Neurosurgery  · CT LS spine on admission showed L5S1 disc herniation  · MRI LS spine showed "rind of abnormal enhancement surrounding the L5-S1 disc space primarily anteriorly and laterally  Subtle fluid signal within the L5-S1 disc also noted  The possibility of discitis cannot be excluded at this level " ESR and CRP were elevated, consistent with discitis  · Initially planned for IR biopsy but this was not needed given positive blood cultures  · PT evaluation done and recommended for acute inpatient rehab at discharge    Patient is from [place] but is willing to do short term acute rehab locally for 2-3 weeks and then his wife will continue to provide him care locally at their house in the [place] [address] prior to return to [place]

## 2018-09-06 NOTE — ASSESSMENT & PLAN NOTE
· Transient mild YONATAN to 1 5-Suspect this was due to NSAIDs and hypotension/sepsis  · Creatinine has completely normalized after IV fluids

## 2018-09-06 NOTE — SOCIAL WORK
BIN spoke with Meghan from [facility]  Meghan updated that patient is inquiring if patient can be discharged to facility without insurance authorization  Meghan confirmed with her manager that patient would have to pay out of pocket upfront for half his stay at [facility]   If insurance [employer] is denied then patient would have to pay the full cost  Sergio Smith will reach out to patient and wife to update them on the financial cost

## 2018-09-06 NOTE — ASSESSMENT & PLAN NOTE
· Presented with intractable lower back pain, also with pain into his left foot and new foot drop after doing significant exertion in his yard and going on a prolonged plane ride  · History of lower back pain and surgery in [facility] 5 years ago for bulging disc at L5  During the course of this hospitalization I updated his neurosurgeon and internist  · Change percocet to oxycodone 10-15 mg every 4 hours PRN moderate-severe pain given LFTs  · LSO brace recommended per Neurosurgery  · CT LS spine on admission showed L5S1 disc herniation  · MRI LS spine showed "rind of abnormal enhancement surrounding the L5-S1 disc space primarily anteriorly and laterally  Subtle fluid signal within the L5-S1 disc also noted  The possibility of discitis cannot be excluded at this level " ESR and CRP were elevated, consistent with discitis  · Initially planned for IR biopsy but this was not needed given positive blood cultures  · PT evaluation done and recommended for acute inpatient rehab at discharge    Patient is from [place] but is willing to do short term acute rehab locally for 2-3 weeks and then his wife will continue to provide him care locally at their house in the [place] [address] prior to return to [place]

## 2018-09-06 NOTE — SOCIAL WORK
BIN called [employer]  Patient would have to pay out of pocket for a BLS transport to [address] [place] [address]  An estimate from [facility] is roughly around $1500  Patient would like to discuss with his wife

## 2018-09-06 NOTE — PLAN OF CARE

## 2018-09-06 NOTE — PROGRESS NOTES
Progress Note - Mitchell Bird 1960, 60 y o  male MRN: [id number]    Unit/Bed#: -01 Encounter: [id number]    Primary Care Provider: No primary care provider on file  Date and time admitted to hospital: 8/25/2018  9:25 PM  * Discitis of lumbosacral region   Assessment & Plan    · Presented with intractable lower back pain, also with pain into his left foot and new foot drop after doing significant exertion in his yard and going on a prolonged plane ride  · [facility] bulging disc at L5  During the course of this hospitalization I updated his neurosurgeon and internist  · Change percocet to oxycodone 10-15 mg every 4 hours PRN moderate-severe pain given LFTs  · LSO brace recommended per Neurosurgery  · CT LS spine on admission showed L5S1 disc herniation  · MRI LS spine showed "rind of abnormal enhancement surrounding the L5-S1 disc space primarily anteriorly and laterally  Subtle fluid signal within the L5-S1 disc also noted  The possibility of discitis cannot be excluded at this level " ESR and CRP were elevated, consistent with discitis  · Initially planned for IR biopsy but this was not needed given positive blood cultures  · PT evaluation done and recommended for acute inpatient rehab at discharge  Patient is from [place] but is willing to do short term acute rehab locally for 2-3 weeks and then his wife will continue to provide him care locally at their house in the [employer] prior to return to [place]        Bacteremia due to group B Streptococcus   [address] [place] [address]    · Repeat blood cultures negative @ 5 days  · Overall clinically improved but will need long-term IV antibiotics  · On high-dose cephazolin 2 g IV q 8 hours for 6 weeks total through 10/9/18  · PICC line placed  Discontinue after antibiotics completed  · Weekly CBC w/diff, BMP while on antibiotics            VTE Pharmacologic Prophylaxis:   Pharmacologic: Pharmacologic VTE Prophylaxis contraindicated due to low platelets  Mechanical VTE Prophylaxis in Place: No    Patient Centered Rounds: I have performed bedside rounds with nursing staff today  Discussions with Specialists or Other Care Team Provider:  Spoke with case management,  They have been aggressively trying to connect with his insurance company and move his proceedings forward to get his authorization and discharge him    Education and Discussions with Family / Patient:     Time Spent for Care: 25 min  More than 50% of total time spent on counseling and coordination of care as described above  Current Length of Stay: 9 day(s)    Current Patient Status: Inpatient   Certification Statement: The patient will continue to require additional inpatient hospital stay due to Still awaiting insurance authorization    Discharge Plan:   Patient has been medically stable for discharge for several days but unfortunately has been held up in the hospital due to inability to get insurance authorization and transfer him to inpatient  Acute rehab in a timely fashion    Code Status: Level 1 - Full Code      Subjective:    Patient is frustrated and stating that he will except the cost of rehab as he would rather go now and deal with the insurance authorization later  He continues to have back pain when he gets up and ambulates but has reasonable relief with current regimen and has been having adequate bowel movements and offers no other new complaints or concerns  He is using his magnetic stim device from home for pain  He is  Also doing more incentive spirometry now that his nurse has reminded him  Objective:     Vitals:   Temp (24hrs), Av 8 °F (37 1 °C), Min:97 8 °F (36 6 °C), Max:99 7 °F (37 6 °C)    HR:  [65-71] 65  Resp:  [16-18] 18  BP: (124-140)/(68-79) 124/68  SpO2:  [94 %-98 %] 94 %  There is no height or weight on file to calculate BMI  Input and Output Summary (last 24 hours):        Intake/Output Summary (Last 24 hours) at 09/06/18 1146  Last data filed at 09/06/18 0919   Gross per 24 hour   Intake                0 ml   Output             1850 ml   Net            -1850 ml       Physical Exam:     Physical Exam   Constitutional: He appears well-developed and well-nourished  No distress  Does appear to be in some pain after having just ambulated from the bathroom,  But otherwise clinically nontoxic   HENT:   Head: Normocephalic and atraumatic  Eyes: Conjunctivae are normal  Right eye exhibits no discharge  No scleral icterus  Neck: Neck supple  Cardiovascular: Normal rate, regular rhythm and normal heart sounds  No murmur heard  Pulmonary/Chest: Effort normal and breath sounds normal  No respiratory distress  He has no wheezes  He has no rales  Abdominal: Soft  Bowel sounds are normal  He exhibits no distension  There is no tenderness  There is no rebound  Musculoskeletal: He exhibits no edema  Neurological: He is alert  Awake alert and interactive he is a good historian   Skin: Skin is warm and dry  No rash noted  He is not diaphoretic  No erythema  No pallor  Psychiatric: He has a normal mood and affect  His behavior is normal  Judgment and thought content normal    Vitals reviewed  Additional Data:     Labs:      Results from last 7 days  Lab Units 09/04/18  0527   WBC Thousand/uL 6 46   HEMOGLOBIN g/dL 11 9*   HEMATOCRIT % 35 7*   PLATELETS Thousands/uL 155   NEUTROS PCT % 46   LYMPHS PCT % 37   MONOS PCT % 13*   EOS PCT % 3       Results from last 7 days  Lab Units 09/04/18  0527  08/31/18  0515   SODIUM mmol/L 136  < > 136   POTASSIUM mmol/L 4 3  < > 3 2*   CHLORIDE mmol/L 100  < > 100   CO2 mmol/L 29  < > 28   BUN mg/dL 12  < > 8   CREATININE mg/dL 0 88  < > 0 80   CALCIUM mg/dL 9 8  < > 8 6   ALK PHOS U/L  --   --  191*   ALT U/L  --   --  35   AST U/L  --   --  51*   < > = values in this interval not displayed  * I Have Reviewed All Lab Data Listed Above    * Additional Pertinent Lab Tests Reviewed: No New Labs Available For Today    Imaging:    Imaging Reports Reviewed Today Include:   Imaging Personally Reviewed by Myself Includes:      Recent Cultures (last 7 days):           Last 24 Hours Medication List:     Current Facility-Administered Medications:  cefazolin 2,000 mg Intravenous Q8H Nora Hinojosa PA-C Last Rate: 2,000 mg (09/06/18 0629)   diphenhydrAMINE-zinc acetate  Topical TID PRN Nora Hinojosa PA-C    docusate sodium 100 mg Oral BID Nora Hinojosa PA-C    folic acid 1 mg Oral Daily Nora Hinojosa PA-C    levothyroxine 50 mcg Oral Early Morning Idania OwensKIRAN    lidocaine 2 patch Transdermal Daily Nora Hinojosa PA-C    melatonin 6 mg Oral HS Nora Hinojosa PA-C    menthol-methyl salicylate  Apply externally TID Nora Hinojosa PA-C    methocarbamol 750 mg Oral Q6H [address] Nora Hinojosa PA-C    morphine injection 2 mg Intravenous Q4H PRN Yamilet Marquez MD    ondansetron 4 mg Intravenous Q6H PRN Lenward Bowels, KIRAN    oxyCODONE 10 mg Oral Q4H PRN Gearl Maurisio, KIRAN    oxyCODONE 15 mg Oral Q4H PRN Gearl Capron, KIRAN    pantoprazole 40 mg Oral Early Morning Yamilet Marquez MD    polyethylene glycol 17 g Oral Daily PRN Nora Hinojosa, KIRAN    senna 1 tablet Oral BID Nora Hinojosa PA-C    thiamine 100 mg Oral Daily Nora Hinojosa, KIRAN    traMADol 50 mg Oral Q6H PRN Lenward BowelsKIRAN         Today, Patient Was Seen By: Val Barba PA-C    ** Please Note: Dictation voice to text software may have been used in the creation of this document   **

## 2018-09-06 NOTE — SOCIAL WORK
CM called [facility] [employer] to check on insurance auth  Representative from [employer] provided CM with patient's nurse that is reviewing his case  Martha Law [phone]

## 2018-09-06 NOTE — SOCIAL WORK
CM spoke with Meghan from [facility]  Patient wants to be discharged today to [facility]  Patient understands that he will be responsible for cost of acute rehab stay  Per Meghan, patient can be set-up for transport  If patient's wife would like to transport patient, [facility] will help patient transport from car into facility at arrival      CM will f/u with patient and wife  Slim updated

## 2018-09-07 LAB
ALBUMIN SERPL BCP-MCNC: 2.6 G/DL (ref 3.5–5)
ALP SERPL-CCNC: 154 U/L (ref 46–116)
ALT SERPL W P-5'-P-CCNC: 51 U/L (ref 12–78)
ANION GAP SERPL CALCULATED.3IONS-SCNC: 8 MMOL/L (ref 4–13)
AST SERPL W P-5'-P-CCNC: 72 U/L (ref 5–45)
BASOPHILS # BLD AUTO: 0.05 THOUSANDS/ΜL (ref 0–0.1)
BASOPHILS NFR BLD AUTO: 1 % (ref 0–1)
BILIRUB SERPL-MCNC: 0.9 MG/DL (ref 0.2–1)
BUN SERPL-MCNC: 14 MG/DL (ref 5–25)
CALCIUM SERPL-MCNC: 9.8 MG/DL (ref 8.3–10.1)
CHLORIDE SERPL-SCNC: 97 MMOL/L (ref 100–108)
CO2 SERPL-SCNC: 27 MMOL/L (ref 21–32)
CREAT SERPL-MCNC: 0.68 MG/DL (ref 0.6–1.3)
EOSINOPHIL # BLD AUTO: 0.17 THOUSAND/ΜL (ref 0–0.61)
EOSINOPHIL NFR BLD AUTO: 2 % (ref 0–6)
ERYTHROCYTE [DISTWIDTH] IN BLOOD BY AUTOMATED COUNT: 13 % (ref 11.6–15.1)
GFR SERPL CREATININE-BSD FRML MDRD: 105 ML/MIN/1.73SQ M
GLUCOSE SERPL-MCNC: 116 MG/DL (ref 65–140)
HCT VFR BLD AUTO: 34.9 % (ref 36.5–49.3)
HGB BLD-MCNC: 11.8 G/DL (ref 12–17)
IMM GRANULOCYTES # BLD AUTO: 0.02 THOUSAND/UL (ref 0–0.2)
IMM GRANULOCYTES NFR BLD AUTO: 0 % (ref 0–2)
LYMPHOCYTES # BLD AUTO: 2.39 THOUSANDS/ΜL (ref 0.6–4.47)
LYMPHOCYTES NFR BLD AUTO: 34 % (ref 14–44)
MCH RBC QN AUTO: 32.6 PG (ref 26.8–34.3)
MCHC RBC AUTO-ENTMCNC: 33.8 G/DL (ref 31.4–37.4)
MCV RBC AUTO: 96 FL (ref 82–98)
MONOCYTES # BLD AUTO: 0.79 THOUSAND/ΜL (ref 0.17–1.22)
MONOCYTES NFR BLD AUTO: 11 % (ref 4–12)
NEUTROPHILS # BLD AUTO: 3.66 THOUSANDS/ΜL (ref 1.85–7.62)
NEUTS SEG NFR BLD AUTO: 52 % (ref 43–75)
NRBC BLD AUTO-RTO: 0 /100 WBCS
PLATELET # BLD AUTO: 256 THOUSANDS/UL (ref 149–390)
PMV BLD AUTO: 9.1 FL (ref 8.9–12.7)
POTASSIUM SERPL-SCNC: 3.8 MMOL/L (ref 3.5–5.3)
PREALB SERPL-MCNC: 23.6 MG/DL (ref 18–40)
PROT SERPL-MCNC: 8.6 G/DL (ref 6.4–8.2)
RBC # BLD AUTO: 3.62 MILLION/UL (ref 3.88–5.62)
SODIUM SERPL-SCNC: 132 MMOL/L (ref 136–145)
WBC # BLD AUTO: 7.08 THOUSAND/UL (ref 4.31–10.16)

## 2018-09-07 PROCEDURE — 84134 ASSAY OF PREALBUMIN: CPT | Performed by: PHYSICAL MEDICINE & REHABILITATION

## 2018-09-07 PROCEDURE — 85025 COMPLETE CBC W/AUTO DIFF WBC: CPT | Performed by: PHYSICAL MEDICINE & REHABILITATION

## 2018-09-07 PROCEDURE — 80053 COMPREHEN METABOLIC PANEL: CPT | Performed by: PHYSICAL MEDICINE & REHABILITATION

## 2018-09-10 LAB
ALBUMIN SERPL BCP-MCNC: 2.7 G/DL (ref 3.5–5)
ALP SERPL-CCNC: 163 U/L (ref 46–116)
ALT SERPL W P-5'-P-CCNC: 59 U/L (ref 12–78)
ANION GAP SERPL CALCULATED.3IONS-SCNC: 8 MMOL/L (ref 4–13)
AST SERPL W P-5'-P-CCNC: 75 U/L (ref 5–45)
BASOPHILS # BLD AUTO: 0.05 THOUSANDS/ΜL (ref 0–0.1)
BASOPHILS NFR BLD AUTO: 1 % (ref 0–1)
BILIRUB SERPL-MCNC: 0.6 MG/DL (ref 0.2–1)
BUN SERPL-MCNC: 14 MG/DL (ref 5–25)
CALCIUM SERPL-MCNC: 9.3 MG/DL (ref 8.3–10.1)
CHLORIDE SERPL-SCNC: 96 MMOL/L (ref 100–108)
CO2 SERPL-SCNC: 29 MMOL/L (ref 21–32)
CREAT SERPL-MCNC: 0.7 MG/DL (ref 0.6–1.3)
EOSINOPHIL # BLD AUTO: 0.25 THOUSAND/ΜL (ref 0–0.61)
EOSINOPHIL NFR BLD AUTO: 4 % (ref 0–6)
ERYTHROCYTE [DISTWIDTH] IN BLOOD BY AUTOMATED COUNT: 12.9 % (ref 11.6–15.1)
GFR SERPL CREATININE-BSD FRML MDRD: 103 ML/MIN/1.73SQ M
GLUCOSE SERPL-MCNC: 105 MG/DL (ref 65–140)
HCT VFR BLD AUTO: 33.6 % (ref 36.5–49.3)
HGB BLD-MCNC: 11.5 G/DL (ref 12–17)
IMM GRANULOCYTES # BLD AUTO: 0.02 THOUSAND/UL (ref 0–0.2)
IMM GRANULOCYTES NFR BLD AUTO: 0 % (ref 0–2)
LYMPHOCYTES # BLD AUTO: 1.92 THOUSANDS/ΜL (ref 0.6–4.47)
LYMPHOCYTES NFR BLD AUTO: 33 % (ref 14–44)
MCH RBC QN AUTO: 32.8 PG (ref 26.8–34.3)
MCHC RBC AUTO-ENTMCNC: 34.2 G/DL (ref 31.4–37.4)
MCV RBC AUTO: 96 FL (ref 82–98)
MONOCYTES # BLD AUTO: 0.92 THOUSAND/ΜL (ref 0.17–1.22)
MONOCYTES NFR BLD AUTO: 16 % (ref 4–12)
NEUTROPHILS # BLD AUTO: 2.63 THOUSANDS/ΜL (ref 1.85–7.62)
NEUTS SEG NFR BLD AUTO: 46 % (ref 43–75)
NRBC BLD AUTO-RTO: 0 /100 WBCS
PLATELET # BLD AUTO: 263 THOUSANDS/UL (ref 149–390)
PMV BLD AUTO: 8.9 FL (ref 8.9–12.7)
POTASSIUM SERPL-SCNC: 3.8 MMOL/L (ref 3.5–5.3)
PROT SERPL-MCNC: 8.4 G/DL (ref 6.4–8.2)
RBC # BLD AUTO: 3.51 MILLION/UL (ref 3.88–5.62)
SODIUM SERPL-SCNC: 133 MMOL/L (ref 136–145)
WBC # BLD AUTO: 5.79 THOUSAND/UL (ref 4.31–10.16)

## 2018-09-10 PROCEDURE — 80053 COMPREHEN METABOLIC PANEL: CPT | Performed by: PHYSICAL MEDICINE & REHABILITATION

## 2018-09-10 PROCEDURE — 85025 COMPLETE CBC W/AUTO DIFF WBC: CPT | Performed by: PHYSICAL MEDICINE & REHABILITATION

## 2018-09-13 LAB
ALBUMIN SERPL BCP-MCNC: 2.6 G/DL (ref 3.5–5)
ALP SERPL-CCNC: 156 U/L (ref 46–116)
ALT SERPL W P-5'-P-CCNC: 39 U/L (ref 12–78)
ANION GAP SERPL CALCULATED.3IONS-SCNC: 5 MMOL/L (ref 4–13)
AST SERPL W P-5'-P-CCNC: 50 U/L (ref 5–45)
BASOPHILS # BLD AUTO: 0.04 THOUSANDS/ΜL (ref 0–0.1)
BASOPHILS NFR BLD AUTO: 1 % (ref 0–1)
BILIRUB SERPL-MCNC: 0.5 MG/DL (ref 0.2–1)
BUN SERPL-MCNC: 13 MG/DL (ref 5–25)
CALCIUM SERPL-MCNC: 9.2 MG/DL (ref 8.3–10.1)
CHLORIDE SERPL-SCNC: 98 MMOL/L (ref 100–108)
CO2 SERPL-SCNC: 31 MMOL/L (ref 21–32)
CREAT SERPL-MCNC: 0.63 MG/DL (ref 0.6–1.3)
EOSINOPHIL # BLD AUTO: 0.25 THOUSAND/ΜL (ref 0–0.61)
EOSINOPHIL NFR BLD AUTO: 5 % (ref 0–6)
ERYTHROCYTE [DISTWIDTH] IN BLOOD BY AUTOMATED COUNT: 12.7 % (ref 11.6–15.1)
GFR SERPL CREATININE-BSD FRML MDRD: 108 ML/MIN/1.73SQ M
GLUCOSE SERPL-MCNC: 95 MG/DL (ref 65–140)
HCT VFR BLD AUTO: 32.8 % (ref 36.5–49.3)
HGB BLD-MCNC: 11 G/DL (ref 12–17)
IMM GRANULOCYTES # BLD AUTO: 0.01 THOUSAND/UL (ref 0–0.2)
IMM GRANULOCYTES NFR BLD AUTO: 0 % (ref 0–2)
LYMPHOCYTES # BLD AUTO: 2.07 THOUSANDS/ΜL (ref 0.6–4.47)
LYMPHOCYTES NFR BLD AUTO: 39 % (ref 14–44)
MCH RBC QN AUTO: 32.4 PG (ref 26.8–34.3)
MCHC RBC AUTO-ENTMCNC: 33.5 G/DL (ref 31.4–37.4)
MCV RBC AUTO: 97 FL (ref 82–98)
MONOCYTES # BLD AUTO: 0.81 THOUSAND/ΜL (ref 0.17–1.22)
MONOCYTES NFR BLD AUTO: 16 % (ref 4–12)
NEUTROPHILS # BLD AUTO: 1.99 THOUSANDS/ΜL (ref 1.85–7.62)
NEUTS SEG NFR BLD AUTO: 39 % (ref 43–75)
NRBC BLD AUTO-RTO: 0 /100 WBCS
PLATELET # BLD AUTO: 192 THOUSANDS/UL (ref 149–390)
PMV BLD AUTO: 9.1 FL (ref 8.9–12.7)
POTASSIUM SERPL-SCNC: 4 MMOL/L (ref 3.5–5.3)
PROT SERPL-MCNC: 7.8 G/DL (ref 6.4–8.2)
RBC # BLD AUTO: 3.4 MILLION/UL (ref 3.88–5.62)
SODIUM SERPL-SCNC: 134 MMOL/L (ref 136–145)
WBC # BLD AUTO: 5.17 THOUSAND/UL (ref 4.31–10.16)

## 2018-09-13 PROCEDURE — 80053 COMPREHEN METABOLIC PANEL: CPT | Performed by: PHYSICAL MEDICINE & REHABILITATION

## 2018-09-13 PROCEDURE — 85025 COMPLETE CBC W/AUTO DIFF WBC: CPT | Performed by: PHYSICAL MEDICINE & REHABILITATION

## 2018-09-17 LAB
ALBUMIN SERPL BCP-MCNC: 2.6 G/DL (ref 3.5–5)
ALP SERPL-CCNC: 151 U/L (ref 46–116)
ALT SERPL W P-5'-P-CCNC: 28 U/L (ref 12–78)
ANION GAP SERPL CALCULATED.3IONS-SCNC: 5 MMOL/L (ref 4–13)
AST SERPL W P-5'-P-CCNC: 40 U/L (ref 5–45)
BASOPHILS # BLD AUTO: 0.03 THOUSANDS/ΜL (ref 0–0.1)
BASOPHILS NFR BLD AUTO: 1 % (ref 0–1)
BILIRUB SERPL-MCNC: 0.4 MG/DL (ref 0.2–1)
BUN SERPL-MCNC: 14 MG/DL (ref 5–25)
CALCIUM SERPL-MCNC: 9.2 MG/DL (ref 8.3–10.1)
CHLORIDE SERPL-SCNC: 99 MMOL/L (ref 100–108)
CO2 SERPL-SCNC: 32 MMOL/L (ref 21–32)
CREAT SERPL-MCNC: 0.74 MG/DL (ref 0.6–1.3)
EOSINOPHIL # BLD AUTO: 0.36 THOUSAND/ΜL (ref 0–0.61)
EOSINOPHIL NFR BLD AUTO: 8 % (ref 0–6)
ERYTHROCYTE [DISTWIDTH] IN BLOOD BY AUTOMATED COUNT: 12.3 % (ref 11.6–15.1)
GFR SERPL CREATININE-BSD FRML MDRD: 101 ML/MIN/1.73SQ M
GLUCOSE SERPL-MCNC: 106 MG/DL (ref 65–140)
HCT VFR BLD AUTO: 31.5 % (ref 36.5–49.3)
HGB BLD-MCNC: 10.6 G/DL (ref 12–17)
IMM GRANULOCYTES # BLD AUTO: 0.01 THOUSAND/UL (ref 0–0.2)
IMM GRANULOCYTES NFR BLD AUTO: 0 % (ref 0–2)
LYMPHOCYTES # BLD AUTO: 1.86 THOUSANDS/ΜL (ref 0.6–4.47)
LYMPHOCYTES NFR BLD AUTO: 38 % (ref 14–44)
MCH RBC QN AUTO: 32.3 PG (ref 26.8–34.3)
MCHC RBC AUTO-ENTMCNC: 33.7 G/DL (ref 31.4–37.4)
MCV RBC AUTO: 96 FL (ref 82–98)
MONOCYTES # BLD AUTO: 0.8 THOUSAND/ΜL (ref 0.17–1.22)
MONOCYTES NFR BLD AUTO: 17 % (ref 4–12)
NEUTROPHILS # BLD AUTO: 1.68 THOUSANDS/ΜL (ref 1.85–7.62)
NEUTS SEG NFR BLD AUTO: 36 % (ref 43–75)
NRBC BLD AUTO-RTO: 0 /100 WBCS
PLATELET # BLD AUTO: 139 THOUSANDS/UL (ref 149–390)
PMV BLD AUTO: 9.3 FL (ref 8.9–12.7)
POTASSIUM SERPL-SCNC: 4.1 MMOL/L (ref 3.5–5.3)
PROT SERPL-MCNC: 7.9 G/DL (ref 6.4–8.2)
RBC # BLD AUTO: 3.28 MILLION/UL (ref 3.88–5.62)
SODIUM SERPL-SCNC: 136 MMOL/L (ref 136–145)
WBC # BLD AUTO: 4.74 THOUSAND/UL (ref 4.31–10.16)

## 2018-09-17 PROCEDURE — 85025 COMPLETE CBC W/AUTO DIFF WBC: CPT | Performed by: PHYSICAL MEDICINE & REHABILITATION

## 2018-09-17 PROCEDURE — 80053 COMPREHEN METABOLIC PANEL: CPT | Performed by: PHYSICAL MEDICINE & REHABILITATION
